# Patient Record
Sex: FEMALE | Employment: OTHER | URBAN - METROPOLITAN AREA
[De-identification: names, ages, dates, MRNs, and addresses within clinical notes are randomized per-mention and may not be internally consistent; named-entity substitution may affect disease eponyms.]

---

## 2021-01-01 ENCOUNTER — APPOINTMENT (OUTPATIENT)
Dept: OCCUPATIONAL THERAPY | Facility: CLINIC | Age: 76
DRG: 834 | End: 2021-01-01
Attending: INTERNAL MEDICINE
Payer: COMMERCIAL

## 2021-01-01 ENCOUNTER — TELEPHONE (OUTPATIENT)
Dept: GASTROENTEROLOGY | Facility: CLINIC | Age: 76
End: 2021-01-01

## 2021-01-01 ENCOUNTER — APPOINTMENT (OUTPATIENT)
Dept: PHYSICAL THERAPY | Facility: CLINIC | Age: 76
DRG: 834 | End: 2021-01-01
Attending: INTERNAL MEDICINE
Payer: COMMERCIAL

## 2021-01-01 ENCOUNTER — APPOINTMENT (OUTPATIENT)
Dept: CARDIOLOGY | Facility: CLINIC | Age: 76
DRG: 834 | End: 2021-01-01
Attending: PHYSICIAN ASSISTANT
Payer: COMMERCIAL

## 2021-01-01 ENCOUNTER — APPOINTMENT (OUTPATIENT)
Dept: PHYSICAL THERAPY | Facility: CLINIC | Age: 76
DRG: 834 | End: 2021-01-01
Attending: PHYSICIAN ASSISTANT
Payer: COMMERCIAL

## 2021-01-01 ENCOUNTER — CARE COORDINATION (OUTPATIENT)
Dept: GASTROENTEROLOGY | Facility: CLINIC | Age: 76
End: 2021-01-01

## 2021-01-01 ENCOUNTER — APPOINTMENT (OUTPATIENT)
Dept: OCCUPATIONAL THERAPY | Facility: CLINIC | Age: 76
DRG: 834 | End: 2021-01-01
Attending: PHYSICIAN ASSISTANT
Payer: COMMERCIAL

## 2021-01-01 ENCOUNTER — APPOINTMENT (OUTPATIENT)
Dept: ULTRASOUND IMAGING | Facility: CLINIC | Age: 76
DRG: 834 | End: 2021-01-01
Attending: PHYSICIAN ASSISTANT
Payer: COMMERCIAL

## 2021-01-01 ENCOUNTER — TRANSCRIBE ORDERS (OUTPATIENT)
Dept: OTHER | Age: 76
End: 2021-01-01

## 2021-01-01 ENCOUNTER — LAB (OUTPATIENT)
Dept: LAB | Facility: CLINIC | Age: 76
DRG: 834 | End: 2021-01-01
Attending: INTERNAL MEDICINE
Payer: COMMERCIAL

## 2021-01-01 ENCOUNTER — PRE VISIT (OUTPATIENT)
Dept: GASTROENTEROLOGY | Facility: CLINIC | Age: 76
End: 2021-01-01

## 2021-01-01 ENCOUNTER — TRANSFERRED RECORDS (OUTPATIENT)
Dept: HEALTH INFORMATION MANAGEMENT | Facility: CLINIC | Age: 76
End: 2021-01-01

## 2021-01-01 ENCOUNTER — APPOINTMENT (OUTPATIENT)
Dept: GENERAL RADIOLOGY | Facility: CLINIC | Age: 76
DRG: 834 | End: 2021-01-01
Attending: INTERNAL MEDICINE
Payer: COMMERCIAL

## 2021-01-01 ENCOUNTER — ANCILLARY PROCEDURE (OUTPATIENT)
Dept: ULTRASOUND IMAGING | Facility: CLINIC | Age: 76
End: 2021-01-01
Payer: COMMERCIAL

## 2021-01-01 ENCOUNTER — HOSPITAL ENCOUNTER (INPATIENT)
Facility: CLINIC | Age: 76
LOS: 12 days | Discharge: HOSPICE | DRG: 834 | End: 2021-07-29
Attending: INTERNAL MEDICINE | Admitting: STUDENT IN AN ORGANIZED HEALTH CARE EDUCATION/TRAINING PROGRAM
Payer: COMMERCIAL

## 2021-01-01 ENCOUNTER — DOCUMENTATION ONLY (OUTPATIENT)
Dept: OTHER | Facility: CLINIC | Age: 76
End: 2021-01-01

## 2021-01-01 ENCOUNTER — VIRTUAL VISIT (OUTPATIENT)
Dept: GASTROENTEROLOGY | Facility: CLINIC | Age: 76
End: 2021-01-01
Attending: PHYSICIAN ASSISTANT
Payer: COMMERCIAL

## 2021-01-01 VITALS
OXYGEN SATURATION: 95 % | BODY MASS INDEX: 25.04 KG/M2 | RESPIRATION RATE: 16 BRPM | TEMPERATURE: 93 F | DIASTOLIC BLOOD PRESSURE: 50 MMHG | HEIGHT: 64 IN | HEART RATE: 107 BPM | SYSTOLIC BLOOD PRESSURE: 125 MMHG | WEIGHT: 146.7 LBS

## 2021-01-01 DIAGNOSIS — B19.20 HEPATITIS C VIRUS INFECTION WITHOUT HEPATIC COMA, UNSPECIFIED CHRONICITY: ICD-10-CM

## 2021-01-01 DIAGNOSIS — D64.9 ANEMIA, UNSPECIFIED TYPE: Primary | ICD-10-CM

## 2021-01-01 DIAGNOSIS — C91.00 ACUTE LYMPHOCYTIC LEUKEMIA (H): Primary | ICD-10-CM

## 2021-01-01 DIAGNOSIS — Z11.59 NEED FOR HEPATITIS B SCREENING TEST: ICD-10-CM

## 2021-01-01 DIAGNOSIS — D69.6 THROMBOCYTOPENIA (H): ICD-10-CM

## 2021-01-01 DIAGNOSIS — Z51.5 HOSPICE CARE PATIENT: ICD-10-CM

## 2021-01-01 DIAGNOSIS — B18.2 CHRONIC HEPATITIS C WITHOUT HEPATIC COMA (H): Primary | ICD-10-CM

## 2021-01-01 DIAGNOSIS — B19.20 HEPATITIS C VIRUS INFECTION WITHOUT HEPATIC COMA, UNSPECIFIED CHRONICITY: Primary | ICD-10-CM

## 2021-01-01 DIAGNOSIS — I85.10 SECONDARY ESOPHAGEAL VARICES WITHOUT BLEEDING (H): ICD-10-CM

## 2021-01-01 DIAGNOSIS — R78.81 GRAM-POSITIVE BACTEREMIA: ICD-10-CM

## 2021-01-01 DIAGNOSIS — B18.2 CHRONIC HEPATITIS C WITHOUT HEPATIC COMA (H): ICD-10-CM

## 2021-01-01 DIAGNOSIS — C91.00 ACUTE LYMPHOBLASTIC LEUKEMIA (ALL) NOT HAVING ACHIEVED REMISSION (H): Primary | ICD-10-CM

## 2021-01-01 LAB
3D LVEF ECHO: NORMAL
ABO/RH(D): NORMAL
ACANTHOCYTES BLD QL SMEAR: SLIGHT
ALBUMIN SERPL-MCNC: 2.1 G/DL (ref 3.4–5)
ALBUMIN SERPL-MCNC: 2.1 G/DL (ref 3.4–5)
ALBUMIN SERPL-MCNC: 2.3 G/DL (ref 3.4–5)
ALBUMIN SERPL-MCNC: 2.4 G/DL (ref 3.4–5)
ALBUMIN SERPL-MCNC: 2.5 G/DL (ref 3.4–5)
ALBUMIN SERPL-MCNC: 3.2 G/DL (ref 3.4–5)
ALBUMIN UR-MCNC: NEGATIVE MG/DL
ALP SERPL-CCNC: 51 U/L (ref 40–150)
ALP SERPL-CCNC: 52 U/L (ref 40–150)
ALP SERPL-CCNC: 55 U/L (ref 40–150)
ALP SERPL-CCNC: 63 U/L (ref 40–150)
ALP SERPL-CCNC: 65 U/L (ref 40–150)
ALP SERPL-CCNC: 68 U/L (ref 40–150)
ALP SERPL-CCNC: 72 U/L (ref 40–150)
ALP SERPL-CCNC: 73 U/L (ref 40–150)
ALP SERPL-CCNC: 78 U/L (ref 40–150)
ALP SERPL-CCNC: 83 U/L (ref 40–150)
ALT SERPL W P-5'-P-CCNC: 13 U/L (ref 0–50)
ALT SERPL W P-5'-P-CCNC: 14 U/L (ref 0–50)
ALT SERPL W P-5'-P-CCNC: 17 U/L (ref 0–50)
ALT SERPL W P-5'-P-CCNC: 25 U/L (ref 0–50)
ALT SERPL W P-5'-P-CCNC: 33 U/L (ref 0–50)
ALT SERPL W P-5'-P-CCNC: 36 U/L (ref 0–50)
ALT SERPL W P-5'-P-CCNC: 37 U/L (ref 0–50)
ALT SERPL W P-5'-P-CCNC: 38 U/L (ref 0–50)
ALT SERPL W P-5'-P-CCNC: 38 U/L (ref 0–50)
ALT SERPL W P-5'-P-CCNC: 39 U/L (ref 0–50)
ALT SERPL-CCNC: 12 IU/L (ref 12–68)
ANION GAP SERPL CALCULATED.3IONS-SCNC: 10 MMOL/L (ref 3–14)
ANION GAP SERPL CALCULATED.3IONS-SCNC: 10 MMOL/L (ref 3–14)
ANION GAP SERPL CALCULATED.3IONS-SCNC: 11 MMOL/L (ref 3–14)
ANION GAP SERPL CALCULATED.3IONS-SCNC: 11 MMOL/L (ref 3–14)
ANION GAP SERPL CALCULATED.3IONS-SCNC: 5 MMOL/L (ref 3–14)
ANION GAP SERPL CALCULATED.3IONS-SCNC: 7 MMOL/L (ref 3–14)
ANION GAP SERPL CALCULATED.3IONS-SCNC: 8 MMOL/L (ref 3–14)
ANION GAP SERPL CALCULATED.3IONS-SCNC: 8 MMOL/L (ref 3–14)
ANION GAP SERPL CALCULATED.3IONS-SCNC: 9 MMOL/L (ref 3–14)
ANTIBODY SCREEN: NEGATIVE
APPEARANCE CSF: CLEAR
APPEARANCE UR: CLEAR
AST SERPL W P-5'-P-CCNC: 52 U/L (ref 0–45)
AST SERPL W P-5'-P-CCNC: 55 U/L (ref 0–45)
AST SERPL W P-5'-P-CCNC: 61 U/L (ref 0–45)
AST SERPL W P-5'-P-CCNC: 63 U/L (ref 0–45)
AST SERPL W P-5'-P-CCNC: 74 U/L (ref 0–45)
AST SERPL W P-5'-P-CCNC: 76 U/L (ref 0–45)
AST SERPL W P-5'-P-CCNC: 86 U/L (ref 0–45)
AST SERPL W P-5'-P-CCNC: 89 U/L (ref 0–45)
AST SERPL W P-5'-P-CCNC: 93 U/L (ref 0–45)
AST SERPL W P-5'-P-CCNC: 96 U/L (ref 0–45)
AST SERPL-CCNC: 62 IU/L (ref 12–37)
ATRIAL RATE - MUSE: 140 BPM
ATRIAL RATE - MUSE: 143 BPM
ATRIAL RATE - MUSE: 416 BPM
BACTERIA BLD CULT: ABNORMAL
BACTERIA BLD CULT: ABNORMAL
BACTERIA BLD CULT: NO GROWTH
BACTERIA CSF CULT: NO GROWTH
BACTERIA UR CULT: NORMAL
BASOPHILS # BLD MANUAL: 0 10E3/UL (ref 0–0.2)
BASOPHILS NFR BLD MANUAL: 0 %
BASOPHILS NFR BLD MANUAL: 1 %
BASOPHILS NFR BLD MANUAL: 2 %
BILIRUB SERPL-MCNC: 1.4 MG/DL (ref 0.2–1.3)
BILIRUB SERPL-MCNC: 1.5 MG/DL (ref 0.2–1.3)
BILIRUB SERPL-MCNC: 1.5 MG/DL (ref 0.2–1.3)
BILIRUB SERPL-MCNC: 1.9 MG/DL (ref 0.2–1.3)
BILIRUB SERPL-MCNC: 2.3 MG/DL (ref 0.2–1.3)
BILIRUB SERPL-MCNC: 2.6 MG/DL (ref 0.2–1.3)
BILIRUB SERPL-MCNC: 2.8 MG/DL (ref 0.2–1.3)
BILIRUB SERPL-MCNC: 3 MG/DL (ref 0.2–1.3)
BILIRUB SERPL-MCNC: 3.1 MG/DL (ref 0.2–1.3)
BILIRUB SERPL-MCNC: 3.3 MG/DL (ref 0.2–1.3)
BILIRUB UR QL STRIP: NEGATIVE
BLASTS # BLD MANUAL: 0 10E3/UL
BLASTS # BLD MANUAL: 0.1 10E3/UL
BLASTS NFR BLD MANUAL: 1 %
BLASTS NFR BLD MANUAL: 2 %
BLASTS NFR BLD MANUAL: 5 %
BLD PROD TYP BPU: NORMAL
BLD PROD TYP BPU: NORMAL
BLOOD COMPONENT TYPE: NORMAL
BLOOD COMPONENT TYPE: NORMAL
BUN SERPL-MCNC: 10 MG/DL (ref 7–30)
BUN SERPL-MCNC: 11 MG/DL (ref 7–30)
BUN SERPL-MCNC: 15 MG/DL (ref 7–30)
BUN SERPL-MCNC: 26 MG/DL (ref 7–30)
BUN SERPL-MCNC: 34 MG/DL (ref 7–30)
BUN SERPL-MCNC: 37 MG/DL (ref 7–30)
BUN SERPL-MCNC: 40 MG/DL (ref 7–30)
BUN SERPL-MCNC: 41 MG/DL (ref 7–30)
BUN SERPL-MCNC: 42 MG/DL (ref 7–30)
BUN SERPL-MCNC: 43 MG/DL (ref 7–30)
BUN SERPL-MCNC: 43 MG/DL (ref 7–30)
BUN SERPL-MCNC: 7 MG/DL (ref 7–30)
BUN SERPL-MCNC: 8 MG/DL (ref 7–30)
BURR CELLS BLD QL SMEAR: SLIGHT
CALCIUM SERPL-MCNC: 7.5 MG/DL (ref 8.5–10.1)
CALCIUM SERPL-MCNC: 7.6 MG/DL (ref 8.5–10.1)
CALCIUM SERPL-MCNC: 7.7 MG/DL (ref 8.5–10.1)
CALCIUM SERPL-MCNC: 7.8 MG/DL (ref 8.5–10.1)
CALCIUM SERPL-MCNC: 7.9 MG/DL (ref 8.5–10.1)
CALCIUM SERPL-MCNC: 8.1 MG/DL (ref 8.5–10.1)
CALCIUM SERPL-MCNC: 8.2 MG/DL (ref 8.5–10.1)
CALCIUM SERPL-MCNC: 8.3 MG/DL (ref 8.5–10.1)
CALCIUM SERPL-MCNC: 8.4 MG/DL (ref 8.5–10.1)
CALCIUM SERPL-MCNC: 8.4 MG/DL (ref 8.5–10.1)
CALCIUM SERPL-MCNC: 8.7 MG/DL (ref 8.5–10.1)
CALCIUM SERPL-MCNC: 8.8 MG/DL (ref 8.5–10.1)
CALCIUM SERPL-MCNC: 8.9 MG/DL (ref 8.5–10.1)
CHLORIDE BLD-SCNC: 104 MMOL/L (ref 94–109)
CHLORIDE BLD-SCNC: 105 MMOL/L (ref 94–109)
CHLORIDE BLD-SCNC: 105 MMOL/L (ref 94–109)
CHLORIDE BLD-SCNC: 106 MMOL/L (ref 94–109)
CHLORIDE BLD-SCNC: 107 MMOL/L (ref 94–109)
CHLORIDE BLD-SCNC: 109 MMOL/L (ref 94–109)
CHLORIDE SERPL-SCNC: 105 MMOL/L (ref 94–109)
CMV IGG SERPL IA-ACNC: <0.2 U/ML
CMV IGG SERPL IA-ACNC: NORMAL
CO2 SERPL-SCNC: 21 MMOL/L (ref 20–32)
CO2 SERPL-SCNC: 22 MMOL/L (ref 20–32)
CO2 SERPL-SCNC: 23 MMOL/L (ref 20–32)
CO2 SERPL-SCNC: 24 MMOL/L (ref 20–32)
CO2 SERPL-SCNC: 24 MMOL/L (ref 20–32)
CO2 SERPL-SCNC: 25 MMOL/L (ref 20–32)
CODING SYSTEM: NORMAL
CODING SYSTEM: NORMAL
COLOR CSF: COLORLESS
COLOR UR AUTO: YELLOW
CREAT SERPL-MCNC: 0.47 MG/DL (ref 0.52–1.04)
CREAT SERPL-MCNC: 0.49 MG/DL (ref 0.52–1.04)
CREAT SERPL-MCNC: 0.5 MG/DL (ref 0.52–1.04)
CREAT SERPL-MCNC: 0.52 MG/DL (ref 0.52–1.04)
CREAT SERPL-MCNC: 0.55 MG/DL (ref 0.52–1.04)
CREAT SERPL-MCNC: 0.62 MG/DL (ref 0.52–1.04)
CREAT SERPL-MCNC: 0.67 MG/DL (ref 0.52–1.04)
CREAT SERPL-MCNC: 0.67 MG/DL (ref 0.52–1.04)
CREAT SERPL-MCNC: 0.68 MG/DL (ref 0.52–1.04)
CREAT SERPL-MCNC: 0.76 MG/DL (ref 0.52–1.04)
CREAT SERPL-MCNC: 0.78 MG/DL (ref 0.52–1.04)
CREAT SERPL-MCNC: 0.86 MG/DL (ref 0.52–1.04)
CREAT SERPL-MCNC: 0.86 MG/DL (ref 0.52–1.04)
CREATININE (EXTERNAL): 0.36 MG/DL (ref 0.55–1.02)
CREATININE (EXTERNAL): 0.4 MG/DL (ref 0.55–1.02)
CROSSMATCH: NORMAL
DACRYOCYTES BLD QL SMEAR: ABNORMAL
DACRYOCYTES BLD QL SMEAR: ABNORMAL
DACRYOCYTES BLD QL SMEAR: SLIGHT
DIASTOLIC BLOOD PRESSURE - MUSE: NORMAL MMHG
EBV VCA IGG SER IA-ACNC: 545 U/ML
EBV VCA IGG SER IA-ACNC: POSITIVE
ELLIPTOCYTES BLD QL SMEAR: ABNORMAL
ELLIPTOCYTES BLD QL SMEAR: SLIGHT
ENTEROCOCCUS FAECALIS: NOT DETECTED
ENTEROCOCCUS FAECIUM: NOT DETECTED
EOSINOPHIL # BLD MANUAL: 0 10E3/UL (ref 0–0.7)
EOSINOPHIL NFR BLD MANUAL: 0 %
EOSINOPHIL NFR BLD MANUAL: 1 %
ERYTHROCYTE [DISTWIDTH] IN BLOOD BY AUTOMATED COUNT: 18.1 % (ref 10–15)
ERYTHROCYTE [DISTWIDTH] IN BLOOD BY AUTOMATED COUNT: 18.6 % (ref 10–15)
ERYTHROCYTE [DISTWIDTH] IN BLOOD BY AUTOMATED COUNT: 18.7 % (ref 10–15)
ERYTHROCYTE [DISTWIDTH] IN BLOOD BY AUTOMATED COUNT: 19 % (ref 10–15)
ERYTHROCYTE [DISTWIDTH] IN BLOOD BY AUTOMATED COUNT: 20.6 % (ref 10–15)
ERYTHROCYTE [DISTWIDTH] IN BLOOD BY AUTOMATED COUNT: 20.8 % (ref 10–15)
ERYTHROCYTE [DISTWIDTH] IN BLOOD BY AUTOMATED COUNT: 21.7 % (ref 10–15)
ERYTHROCYTE [DISTWIDTH] IN BLOOD BY AUTOMATED COUNT: 22.4 % (ref 10–15)
ERYTHROCYTE [DISTWIDTH] IN BLOOD BY AUTOMATED COUNT: 22.5 % (ref 10–15)
ERYTHROCYTE [DISTWIDTH] IN BLOOD BY AUTOMATED COUNT: 22.6 % (ref 10–15)
ERYTHROCYTE [DISTWIDTH] IN BLOOD BY AUTOMATED COUNT: 22.6 % (ref 10–15)
ERYTHROCYTE [DISTWIDTH] IN BLOOD BY AUTOMATED COUNT: 22.7 % (ref 10–15)
FIBRINOGEN PPP-MCNC: 106 MG/DL (ref 170–490)
FIBRINOGEN PPP-MCNC: 109 MG/DL (ref 170–490)
FIBRINOGEN PPP-MCNC: 117 MG/DL (ref 170–490)
FIBRINOGEN PPP-MCNC: 117 MG/DL (ref 170–490)
FIBRINOGEN PPP-MCNC: 119 MG/DL (ref 170–490)
FIBRINOGEN PPP-MCNC: 120 MG/DL (ref 170–490)
FIBRINOGEN PPP-MCNC: 122 MG/DL (ref 170–490)
FIBRINOGEN PPP-MCNC: 123 MG/DL (ref 170–490)
FIBRINOGEN PPP-MCNC: 125 MG/DL (ref 170–490)
FIBRINOGEN PPP-MCNC: 137 MG/DL (ref 170–490)
FIBRINOGEN PPP-MCNC: 155 MG/DL (ref 170–490)
FIBRINOGEN PPP-MCNC: 218 MG/DL (ref 170–490)
FIBRINOGEN PPP-MCNC: 289 MG/DL (ref 170–490)
FIBRINOGEN PPP-MCNC: 304 MG/DL (ref 170–490)
FIBRINOGEN PPP-MCNC: 332 MG/DL (ref 170–490)
FIBRINOGEN PPP-MCNC: 344 MG/DL (ref 170–490)
FIBRINOGEN PPP-MCNC: 99 MG/DL (ref 170–490)
FRAGMENTS BLD QL SMEAR: SLIGHT
GFR ESTIMATED (EXTERNAL): >60 ML/MIN
GFR ESTIMATED (EXTERNAL): >60 ML/MIN/1.73M2
GFR ESTIMATED (IF AFRICAN AMERICAN) (EXTERNAL): >60 ML/MIN
GFR ESTIMATED (IF AFRICAN AMERICAN) (EXTERNAL): >60 ML/MIN/1.73M2
GFR SERPL CREATININE-BSD FRML MDRD: 66 ML/MIN/1.73M2
GFR SERPL CREATININE-BSD FRML MDRD: 66 ML/MIN/1.73M2
GFR SERPL CREATININE-BSD FRML MDRD: 75 ML/MIN/1.73M2
GFR SERPL CREATININE-BSD FRML MDRD: 77 ML/MIN/1.73M2
GFR SERPL CREATININE-BSD FRML MDRD: 86 ML/MIN/1.73M2
GFR SERPL CREATININE-BSD FRML MDRD: 88 ML/MIN/1.73M2
GFR SERPL CREATININE-BSD FRML MDRD: >90 ML/MIN/1.73M2
GFR SERPL CREATININE-BSD FRML MDRD: >90 ML/MIN/{1.73_M2}
GLUCOSE (EXTERNAL): 86 MG/DL (ref 74–106)
GLUCOSE (EXTERNAL): 87 MG/DL (ref 74–106)
GLUCOSE BLD-MCNC: 117 MG/DL (ref 70–99)
GLUCOSE BLD-MCNC: 149 MG/DL (ref 70–99)
GLUCOSE BLD-MCNC: 151 MG/DL (ref 70–99)
GLUCOSE BLD-MCNC: 163 MG/DL (ref 70–99)
GLUCOSE BLD-MCNC: 185 MG/DL (ref 70–99)
GLUCOSE BLD-MCNC: 189 MG/DL (ref 70–99)
GLUCOSE BLD-MCNC: 191 MG/DL (ref 70–99)
GLUCOSE BLD-MCNC: 194 MG/DL (ref 70–99)
GLUCOSE BLD-MCNC: 195 MG/DL (ref 70–99)
GLUCOSE BLD-MCNC: 199 MG/DL (ref 70–99)
GLUCOSE BLD-MCNC: 208 MG/DL (ref 70–99)
GLUCOSE BLD-MCNC: 81 MG/DL (ref 70–99)
GLUCOSE CSF-MCNC: 81 MG/DL (ref 40–70)
GLUCOSE SERPL-MCNC: 90 MG/DL (ref 70–99)
GLUCOSE UR STRIP-MCNC: NEGATIVE MG/DL
GRAM STAIN RESULT: NORMAL
GRAM STAIN RESULT: NORMAL
HAV IGG SER QL IA: NONREACTIVE
HBV CORE AB SERPL QL IA: REACTIVE
HBV CORE AB SERPL QL IA: REACTIVE
HBV DNA SERPL NAA+PROBE-ACNC: NOT DETECTED IU/ML
HBV SURFACE AB SERPL IA-ACNC: >1000 M[IU]/ML
HBV SURFACE AB SERPL IA-ACNC: >1000 M[IU]/ML
HBV SURFACE AG SERPL QL IA: NONREACTIVE
HCT VFR BLD AUTO: 22.1 % (ref 35–47)
HCT VFR BLD AUTO: 22.3 % (ref 35–47)
HCT VFR BLD AUTO: 22.6 % (ref 35–47)
HCT VFR BLD AUTO: 23.2 % (ref 35–47)
HCT VFR BLD AUTO: 23.2 % (ref 35–47)
HCT VFR BLD AUTO: 23.4 % (ref 35–47)
HCT VFR BLD AUTO: 23.4 % (ref 35–47)
HCT VFR BLD AUTO: 23.8 % (ref 35–47)
HCT VFR BLD AUTO: 24.2 % (ref 35–47)
HCT VFR BLD AUTO: 24.3 % (ref 35–47)
HCT VFR BLD AUTO: 25.6 % (ref 35–47)
HCT VFR BLD AUTO: 25.9 % (ref 35–47)
HCT VFR BLD AUTO: 26.5 % (ref 35–47)
HCT VFR BLD AUTO: 27.3 % (ref 35–47)
HCV RNA SERPL NAA+PROBE-ACNC: NOT DETECTED IU/ML
HEMOCCULT STL QL IA: NEGATIVE
HGB BLD-MCNC: 7.1 G/DL (ref 11.7–15.7)
HGB BLD-MCNC: 7.2 G/DL (ref 11.7–15.7)
HGB BLD-MCNC: 7.4 G/DL (ref 11.7–15.7)
HGB BLD-MCNC: 7.7 G/DL (ref 11.7–15.7)
HGB BLD-MCNC: 7.7 G/DL (ref 11.7–15.7)
HGB BLD-MCNC: 7.8 G/DL (ref 11.7–15.7)
HGB BLD-MCNC: 7.8 G/DL (ref 11.7–15.7)
HGB BLD-MCNC: 8 G/DL (ref 11.7–15.7)
HGB BLD-MCNC: 8.2 G/DL (ref 11.7–15.7)
HGB BLD-MCNC: 8.5 G/DL (ref 11.7–15.7)
HGB BLD-MCNC: 8.9 G/DL (ref 11.7–15.7)
HGB BLD-MCNC: 9.2 G/DL (ref 11.7–15.7)
HGB UR QL STRIP: NEGATIVE
HIV 1+2 AB+HIV1 P24 AG SERPL QL IA: NONREACTIVE
HOLD SPECIMEN: NORMAL
HSV1 IGG SERPL QL IA: 35.4 INDEX
HSV1 IGG SERPL QL IA: ABNORMAL
HSV2 IGG SERPL QL IA: 14.7 INDEX
HSV2 IGG SERPL QL IA: ABNORMAL
INR (EXTERNAL): 1.1 (ref 1–1.2)
INR (EXTERNAL): 1.2 (ref 1–1.2)
INR PPP: 1.14 (ref 0.85–1.15)
INR PPP: 1.16 (ref 0.86–1.14)
INR PPP: 1.18 (ref 0.85–1.15)
INR PPP: 1.2 (ref 0.85–1.15)
INR PPP: 1.21 (ref 0.85–1.15)
INR PPP: 1.3 (ref 0.85–1.15)
INR PPP: 1.31 (ref 0.85–1.15)
INR PPP: 1.36 (ref 0.85–1.15)
INR PPP: 1.37 (ref 0.85–1.15)
INR PPP: 1.41 (ref 0.85–1.15)
INR PPP: 1.45 (ref 0.85–1.15)
INR PPP: 1.46 (ref 0.85–1.15)
INR PPP: 1.46 (ref 0.86–1.14)
INR PPP: 1.49 (ref 0.85–1.15)
INR PPP: 1.5 (ref 0.85–1.15)
INR PPP: 1.53 (ref 0.85–1.15)
INR PPP: 1.54 (ref 0.85–1.15)
INR PPP: 1.61 (ref 0.85–1.15)
INTERPRETATION ECG - MUSE: NORMAL
ISSUE DATE AND TIME: NORMAL
ISSUE DATE AND TIME: NORMAL
KETONES UR STRIP-MCNC: NEGATIVE MG/DL
LACTATE SERPL-SCNC: 2.3 MMOL/L (ref 0.7–2)
LACTATE SERPL-SCNC: 2.4 MMOL/L (ref 0.7–2)
LACTATE SERPL-SCNC: 2.5 MMOL/L (ref 0.7–2)
LACTATE SERPL-SCNC: 2.9 MMOL/L (ref 0.7–2)
LACTATE SERPL-SCNC: 3 MMOL/L (ref 0.7–2)
LACTATE SERPL-SCNC: 3.2 MMOL/L (ref 0.7–2)
LACTATE SERPL-SCNC: 3.3 MMOL/L (ref 0.7–2)
LACTATE SERPL-SCNC: 3.4 MMOL/L (ref 0.7–2)
LACTATE SERPL-SCNC: 3.5 MMOL/L (ref 0.7–2)
LACTATE SERPL-SCNC: 3.5 MMOL/L (ref 0.7–2)
LACTATE SERPL-SCNC: 3.6 MMOL/L (ref 0.7–2)
LDH SERPL L TO P-CCNC: 496 U/L (ref 81–234)
LDH SERPL L TO P-CCNC: 545 U/L (ref 81–234)
LDH SERPL L TO P-CCNC: 591 U/L (ref 81–234)
LDH SERPL L TO P-CCNC: 852 U/L (ref 81–234)
LEUKOCYTE ESTERASE UR QL STRIP: NEGATIVE
LISTERIA SPECIES (DETECTED/NOT DETECTED): NOT DETECTED
LVEF ECHO: NORMAL
LYMPHOCYTES # BLD MANUAL: 0 10E3/UL (ref 0.8–5.3)
LYMPHOCYTES # BLD MANUAL: 0.1 10E3/UL (ref 0.8–5.3)
LYMPHOCYTES # BLD MANUAL: 0.2 10E3/UL (ref 0.8–5.3)
LYMPHOCYTES # BLD MANUAL: 0.3 10E3/UL (ref 0.8–5.3)
LYMPHOCYTES # BLD MANUAL: 0.3 10E3/UL (ref 0.8–5.3)
LYMPHOCYTES NFR BLD MANUAL: 16 %
LYMPHOCYTES NFR BLD MANUAL: 17 %
LYMPHOCYTES NFR BLD MANUAL: 19 %
LYMPHOCYTES NFR BLD MANUAL: 2 %
LYMPHOCYTES NFR BLD MANUAL: 32 %
LYMPHOCYTES NFR BLD MANUAL: 4 %
LYMPHOCYTES NFR BLD MANUAL: 4 %
LYMPHOCYTES NFR BLD MANUAL: 5 %
LYMPHOCYTES NFR BLD MANUAL: 7 %
LYMPHOCYTES NFR BLD MANUAL: 7 %
Lab: DETECTED
MAGNESIUM SERPL-MCNC: 1.8 MG/DL (ref 1.6–2.3)
MAGNESIUM SERPL-MCNC: 2.1 MG/DL (ref 1.6–2.3)
MAGNESIUM SERPL-MCNC: 2.3 MG/DL (ref 1.6–2.3)
MAGNESIUM SERPL-MCNC: 2.6 MG/DL (ref 1.6–2.3)
MAGNESIUM SERPL-MCNC: 2.6 MG/DL (ref 1.6–2.3)
MAGNESIUM SERPL-MCNC: 2.8 MG/DL (ref 1.6–2.3)
MAGNESIUM SERPL-MCNC: 2.9 MG/DL (ref 1.6–2.3)
MAGNESIUM SERPL-MCNC: 3 MG/DL (ref 1.6–2.3)
MCH RBC QN AUTO: 30.2 PG (ref 26.5–33)
MCH RBC QN AUTO: 31.1 PG (ref 26.5–33)
MCH RBC QN AUTO: 31.4 PG (ref 26.5–33)
MCH RBC QN AUTO: 31.6 PG (ref 26.5–33)
MCH RBC QN AUTO: 31.7 PG (ref 26.5–33)
MCH RBC QN AUTO: 31.7 PG (ref 26.5–33)
MCH RBC QN AUTO: 31.8 PG (ref 26.5–33)
MCH RBC QN AUTO: 31.9 PG (ref 26.5–33)
MCH RBC QN AUTO: 31.9 PG (ref 26.5–33)
MCH RBC QN AUTO: 32 PG (ref 26.5–33)
MCH RBC QN AUTO: 32.1 PG (ref 26.5–33)
MCH RBC QN AUTO: 32.1 PG (ref 26.5–33)
MCH RBC QN AUTO: 32.2 PG (ref 26.5–33)
MCH RBC QN AUTO: 32.3 PG (ref 26.5–33)
MCHC RBC AUTO-ENTMCNC: 30.8 G/DL (ref 31.5–36.5)
MCHC RBC AUTO-ENTMCNC: 31.4 G/DL (ref 31.5–36.5)
MCHC RBC AUTO-ENTMCNC: 31.7 G/DL (ref 31.5–36.5)
MCHC RBC AUTO-ENTMCNC: 31.8 G/DL (ref 31.5–36.5)
MCHC RBC AUTO-ENTMCNC: 31.8 G/DL (ref 31.5–36.5)
MCHC RBC AUTO-ENTMCNC: 31.9 G/DL (ref 31.5–36.5)
MCHC RBC AUTO-ENTMCNC: 32.1 G/DL (ref 31.5–36.5)
MCHC RBC AUTO-ENTMCNC: 32.1 G/DL (ref 31.5–36.5)
MCHC RBC AUTO-ENTMCNC: 32.8 G/DL (ref 31.5–36.5)
MCHC RBC AUTO-ENTMCNC: 33.2 G/DL (ref 31.5–36.5)
MCHC RBC AUTO-ENTMCNC: 33.2 G/DL (ref 31.5–36.5)
MCHC RBC AUTO-ENTMCNC: 33.6 G/DL (ref 31.5–36.5)
MCHC RBC AUTO-ENTMCNC: 33.7 G/DL (ref 31.5–36.5)
MCHC RBC AUTO-ENTMCNC: 34.2 G/DL (ref 31.5–36.5)
MCV RBC AUTO: 100 FL (ref 78–100)
MCV RBC AUTO: 101 FL (ref 78–100)
MCV RBC AUTO: 105 FL (ref 78–100)
MCV RBC AUTO: 90 FL (ref 78–100)
MCV RBC AUTO: 94 FL (ref 78–100)
MCV RBC AUTO: 94 FL (ref 78–100)
MCV RBC AUTO: 96 FL (ref 78–100)
MCV RBC AUTO: 97 FL (ref 78–100)
MCV RBC AUTO: 98 FL (ref 78–100)
MCV RBC AUTO: 98 FL (ref 78–100)
MCV RBC AUTO: 99 FL (ref 78–100)
METAMYELOCYTES # BLD MANUAL: 0 10E3/UL
METAMYELOCYTES NFR BLD MANUAL: 1 %
MONOCYTES # BLD MANUAL: 0 10E3/UL (ref 0–1.3)
MONOCYTES # BLD MANUAL: 0.1 10E3/UL (ref 0–1.3)
MONOCYTES NFR BLD MANUAL: 0 %
MONOCYTES NFR BLD MANUAL: 1 %
MONOCYTES NFR BLD MANUAL: 2 %
MONOCYTES NFR BLD MANUAL: 3 %
MONOCYTES NFR BLD MANUAL: 4 %
MRSA DNA SPEC QL NAA+PROBE: NEGATIVE
MYELOCYTES # BLD MANUAL: 0 10E3/UL
MYELOCYTES NFR BLD MANUAL: 1 %
NEUTROPHILS # BLD MANUAL: 0.5 10E3/UL (ref 1.6–8.3)
NEUTROPHILS # BLD MANUAL: 0.6 10E3/UL (ref 1.6–8.3)
NEUTROPHILS # BLD MANUAL: 0.6 10E3/UL (ref 1.6–8.3)
NEUTROPHILS # BLD MANUAL: 0.7 10E3/UL (ref 1.6–8.3)
NEUTROPHILS # BLD MANUAL: 0.7 10E3/UL (ref 1.6–8.3)
NEUTROPHILS # BLD MANUAL: 0.8 10E3/UL (ref 1.6–8.3)
NEUTROPHILS # BLD MANUAL: 1 10E3/UL (ref 1.6–8.3)
NEUTROPHILS # BLD MANUAL: 1 10E3/UL (ref 1.6–8.3)
NEUTROPHILS # BLD MANUAL: 1.1 10E3/UL (ref 1.6–8.3)
NEUTROPHILS # BLD MANUAL: 1.2 10E3/UL (ref 1.6–8.3)
NEUTROPHILS NFR BLD MANUAL: 60 %
NEUTROPHILS NFR BLD MANUAL: 73 %
NEUTROPHILS NFR BLD MANUAL: 80 %
NEUTROPHILS NFR BLD MANUAL: 82 %
NEUTROPHILS NFR BLD MANUAL: 87 %
NEUTROPHILS NFR BLD MANUAL: 89 %
NEUTROPHILS NFR BLD MANUAL: 94 %
NEUTROPHILS NFR BLD MANUAL: 94 %
NEUTROPHILS NFR BLD MANUAL: 96 %
NEUTROPHILS NFR BLD MANUAL: 97 %
NITRATE UR QL: NEGATIVE
NRBC # BLD AUTO: 0 10E3/UL
NRBC # BLD AUTO: 0.1 10E3/UL
NRBC BLD AUTO-RTO: 2 /100
NRBC BLD AUTO-RTO: 4 /100
NRBC BLD AUTO-RTO: 5 /100
NRBC BLD MANUAL-RTO: 1 %
NRBC BLD MANUAL-RTO: 13 %
NRBC BLD MANUAL-RTO: 2 %
NRBC BLD MANUAL-RTO: 2 %
NRBC BLD MANUAL-RTO: 3 %
NRBC BLD MANUAL-RTO: 4 %
NRBC BLD MANUAL-RTO: 4 %
NRBC BLD MANUAL-RTO: 5 %
NRBC BLD MANUAL-RTO: 9 %
OTHER CELLS # BLD MANUAL: 0 10E3/UL
OTHER CELLS NFR BLD MANUAL: 4 %
P AXIS - MUSE: 88 DEGREES
P AXIS - MUSE: NORMAL DEGREES
P AXIS - MUSE: NORMAL DEGREES
PATH REPORT.COMMENTS IMP SPEC: NORMAL
PATH REPORT.FINAL DX SPEC: NORMAL
PATH REPORT.GROSS SPEC: NORMAL
PATH REPORT.MICROSCOPIC SPEC OTHER STN: NORMAL
PATH REPORT.RELEVANT HX SPEC: NORMAL
PATH REPORT.SITE OF ORIGIN SPEC: NORMAL
PATH REPORT.SUPPLEMENTAL REPORTS SPEC: NORMAL
PATH REV: ABNORMAL
PH UR STRIP: 6 [PH] (ref 5–7)
PHOSPHATE SERPL-MCNC: 2.9 MG/DL (ref 2.5–4.5)
PHOSPHATE SERPL-MCNC: 3 MG/DL (ref 2.5–4.5)
PHOSPHATE SERPL-MCNC: 4.2 MG/DL (ref 2.5–4.5)
PHOSPHATE SERPL-MCNC: 4.6 MG/DL (ref 2.5–4.5)
PHOSPHATE SERPL-MCNC: 4.6 MG/DL (ref 2.5–4.5)
PHOSPHATE SERPL-MCNC: 4.7 MG/DL (ref 2.5–4.5)
PHOSPHATE SERPL-MCNC: 4.8 MG/DL (ref 2.5–4.5)
PHOSPHATE SERPL-MCNC: 5 MG/DL (ref 2.5–4.5)
PHOSPHATE SERPL-MCNC: 5.1 MG/DL (ref 2.5–4.5)
PHOSPHATE SERPL-MCNC: 5.1 MG/DL (ref 2.5–4.5)
PHOSPHATE SERPL-MCNC: 5.2 MG/DL (ref 2.5–4.5)
PHOSPHATE SERPL-MCNC: 5.4 MG/DL (ref 2.5–4.5)
PHOSPHATE SERPL-MCNC: 5.4 MG/DL (ref 2.5–4.5)
PHOSPHATE SERPL-MCNC: 5.8 MG/DL (ref 2.5–4.5)
PHOSPHATE SERPL-MCNC: 5.9 MG/DL (ref 2.5–4.5)
PLAT MORPH BLD: ABNORMAL
PLATELET # BLD AUTO: 105 10E3/UL (ref 150–450)
PLATELET # BLD AUTO: 12 10E3/UL (ref 150–450)
PLATELET # BLD AUTO: 14 10E3/UL (ref 150–450)
PLATELET # BLD AUTO: 23 10E3/UL (ref 150–450)
PLATELET # BLD AUTO: 28 10E3/UL (ref 150–450)
PLATELET # BLD AUTO: 52 10E3/UL (ref 150–450)
PLATELET # BLD AUTO: 56 10E3/UL (ref 150–450)
PLATELET # BLD AUTO: 72 10E3/UL (ref 150–450)
PLATELET # BLD AUTO: 74 10E3/UL (ref 150–450)
PLATELET # BLD AUTO: 76 10E9/L (ref 150–450)
PLATELET # BLD AUTO: 80 10E3/UL (ref 150–450)
PLATELET # BLD AUTO: 82 10E3/UL (ref 150–450)
PLATELET # BLD AUTO: 85 10E3/UL (ref 150–450)
PLATELET # BLD AUTO: 86 10E3/UL (ref 150–450)
POLYCHROMASIA BLD QL SMEAR: SLIGHT
POLYCHROMASIA BLD QL SMEAR: SLIGHT
POTASSIUM (EXTERNAL): 3.1 MMOL/L (ref 3.5–5.1)
POTASSIUM (EXTERNAL): 3.4 MMOL/L (ref 3.5–5.1)
POTASSIUM BLD-SCNC: 3.4 MMOL/L (ref 3.4–5.3)
POTASSIUM BLD-SCNC: 3.4 MMOL/L (ref 3.4–5.3)
POTASSIUM BLD-SCNC: 3.8 MMOL/L (ref 3.4–5.3)
POTASSIUM BLD-SCNC: 4 MMOL/L (ref 3.4–5.3)
POTASSIUM BLD-SCNC: 4 MMOL/L (ref 3.4–5.3)
POTASSIUM BLD-SCNC: 4.2 MMOL/L (ref 3.4–5.3)
POTASSIUM BLD-SCNC: 4.2 MMOL/L (ref 3.4–5.3)
POTASSIUM BLD-SCNC: 4.3 MMOL/L (ref 3.4–5.3)
POTASSIUM BLD-SCNC: 4.3 MMOL/L (ref 3.4–5.3)
POTASSIUM BLD-SCNC: 4.4 MMOL/L (ref 3.4–5.3)
POTASSIUM BLD-SCNC: 4.6 MMOL/L (ref 3.4–5.3)
POTASSIUM BLD-SCNC: 4.7 MMOL/L (ref 3.4–5.3)
POTASSIUM SERPL-SCNC: 3.5 MMOL/L (ref 3.4–5.3)
PR INTERVAL - MUSE: 136 MS
PR INTERVAL - MUSE: 160 MS
PR INTERVAL - MUSE: NORMAL MS
PROMYELOCYTES # BLD MANUAL: 0 10E3/UL
PROMYELOCYTES # BLD MANUAL: 0.1 10E3/UL
PROMYELOCYTES NFR BLD MANUAL: 1 %
PROMYELOCYTES NFR BLD MANUAL: 4 %
PROT CSF-MCNC: 48 MG/DL (ref 15–60)
PROT SERPL-MCNC: 4.7 G/DL (ref 6.8–8.8)
PROT SERPL-MCNC: 4.8 G/DL (ref 6.8–8.8)
PROT SERPL-MCNC: 4.8 G/DL (ref 6.8–8.8)
PROT SERPL-MCNC: 4.9 G/DL (ref 6.8–8.8)
PROT SERPL-MCNC: 5 G/DL (ref 6.8–8.8)
PROT SERPL-MCNC: 5 G/DL (ref 6.8–8.8)
PROT SERPL-MCNC: 5.1 G/DL (ref 6.8–8.8)
PROT SERPL-MCNC: 5.4 G/DL (ref 6.8–8.8)
PROT SERPL-MCNC: 5.4 G/DL (ref 6.8–8.8)
PROT SERPL-MCNC: 6.7 G/DL (ref 6.8–8.8)
QRS DURATION - MUSE: 66 MS
QRS DURATION - MUSE: 80 MS
QRS DURATION - MUSE: 84 MS
QT - MUSE: 272 MS
QT - MUSE: 298 MS
QT - MUSE: 304 MS
QTC - MUSE: 419 MS
QTC - MUSE: 445 MS
QTC - MUSE: 464 MS
R AXIS - MUSE: -21 DEGREES
R AXIS - MUSE: -23 DEGREES
R AXIS - MUSE: -7 DEGREES
RBC # BLD AUTO: 2.21 10E6/UL (ref 3.8–5.2)
RBC # BLD AUTO: 2.26 10E6/UL (ref 3.8–5.2)
RBC # BLD AUTO: 2.28 10E6/UL (ref 3.8–5.2)
RBC # BLD AUTO: 2.32 10E6/UL (ref 3.8–5.2)
RBC # BLD AUTO: 2.42 10E6/UL (ref 3.8–5.2)
RBC # BLD AUTO: 2.42 10E6/UL (ref 3.8–5.2)
RBC # BLD AUTO: 2.43 10E6/UL (ref 3.8–5.2)
RBC # BLD AUTO: 2.46 10E6/UL (ref 3.8–5.2)
RBC # BLD AUTO: 2.5 10E6/UL (ref 3.8–5.2)
RBC # BLD AUTO: 2.57 10E6/UL (ref 3.8–5.2)
RBC # BLD AUTO: 2.63 10E6/UL (ref 3.8–5.2)
RBC # BLD AUTO: 2.82 10E6/UL (ref 3.8–5.2)
RBC # BLD AUTO: 3.05 10E12/L (ref 3.8–5.2)
RBC # CSF MANUAL: 32 /UL (ref 0–2)
RBC MORPH BLD: ABNORMAL
RBC URINE: 0 /HPF
SA TARGET DNA: NEGATIVE
SARS-COV-2 RNA RESP QL NAA+PROBE: NEGATIVE
SARS-COV-2 RNA RESP QL NAA+PROBE: NEGATIVE
SODIUM SERPL-SCNC: 135 MMOL/L (ref 133–144)
SODIUM SERPL-SCNC: 136 MMOL/L (ref 133–144)
SODIUM SERPL-SCNC: 137 MMOL/L (ref 133–144)
SODIUM SERPL-SCNC: 138 MMOL/L (ref 133–144)
SODIUM SERPL-SCNC: 138 MMOL/L (ref 133–144)
SODIUM SERPL-SCNC: 139 MMOL/L (ref 133–144)
SP GR UR STRIP: 1.01 (ref 1–1.03)
SPECIMEN EXPIRATION DATE: NORMAL
STAPHYLOCOCCUS AUREUS: NOT DETECTED
STAPHYLOCOCCUS EPIDERMIDIS: DETECTED
STAPHYLOCOCCUS LUGDUNENSIS: NOT DETECTED
STREPTOCOCCUS AGALACTIAE: NOT DETECTED
STREPTOCOCCUS ANGINOSUS GROUP: NOT DETECTED
STREPTOCOCCUS PNEUMONIAE: NOT DETECTED
STREPTOCOCCUS PYOGENES: NOT DETECTED
STREPTOCOCCUS SPECIES: NOT DETECTED
SYSTOLIC BLOOD PRESSURE - MUSE: NORMAL MMHG
T AXIS - MUSE: 116 DEGREES
T AXIS - MUSE: 131 DEGREES
T AXIS - MUSE: 161 DEGREES
TRANSITIONAL EPI: 1 /HPF
TUBE # CSF: 4
UNIT ABO/RH: NORMAL
UNIT ABO/RH: NORMAL
UNIT NUMBER: NORMAL
UNIT NUMBER: NORMAL
UNIT STATUS: NORMAL
UNIT STATUS: NORMAL
UNIT TYPE ISBT: 6200
UNIT TYPE ISBT: 9500
UPPER GI ENDOSCOPY: NORMAL
URATE SERPL-MCNC: 3.1 MG/DL (ref 2.6–6)
URATE SERPL-MCNC: 3.4 MG/DL (ref 2.6–6)
URATE SERPL-MCNC: 3.4 MG/DL (ref 2.6–6)
URATE SERPL-MCNC: 3.7 MG/DL (ref 2.6–6)
URATE SERPL-MCNC: 3.8 MG/DL (ref 2.6–6)
URATE SERPL-MCNC: 3.9 MG/DL (ref 2.6–6)
URATE SERPL-MCNC: 4.2 MG/DL (ref 2.6–6)
URATE SERPL-MCNC: 4.4 MG/DL (ref 2.6–6)
URATE SERPL-MCNC: 4.4 MG/DL (ref 2.6–6)
URATE SERPL-MCNC: 4.6 MG/DL (ref 2.6–6)
URATE SERPL-MCNC: 4.9 MG/DL (ref 2.6–6)
URATE SERPL-MCNC: 4.9 MG/DL (ref 2.6–6)
UROBILINOGEN UR STRIP-MCNC: NORMAL MG/DL
VENTRICULAR RATE- MUSE: 134 BPM
VENTRICULAR RATE- MUSE: 140 BPM
VENTRICULAR RATE- MUSE: 143 BPM
WBC # BLD AUTO: 0.1 10E3/UL (ref 4–11)
WBC # BLD AUTO: 0.2 10E3/UL (ref 4–11)
WBC # BLD AUTO: 0.4 10E3/UL (ref 4–11)
WBC # BLD AUTO: 0.6 10E3/UL (ref 4–11)
WBC # BLD AUTO: 0.6 10E3/UL (ref 4–11)
WBC # BLD AUTO: 0.8 10E3/UL (ref 4–11)
WBC # BLD AUTO: 0.9 10E3/UL (ref 4–11)
WBC # BLD AUTO: 1 10E3/UL (ref 4–11)
WBC # BLD AUTO: 1.1 10E3/UL (ref 4–11)
WBC # BLD AUTO: 1.3 10E3/UL (ref 4–11)
WBC # BLD AUTO: 1.3 10E9/L (ref 4–11)
WBC # BLD AUTO: 1.5 10E3/UL (ref 4–11)
WBC # CSF MANUAL: 0 /UL (ref 0–5)
WBC URINE: 2 /HPF

## 2021-01-01 PROCEDURE — 93975 VASCULAR STUDY: CPT | Mod: 26 | Performed by: RADIOLOGY

## 2021-01-01 PROCEDURE — 250N000011 HC RX IP 250 OP 636: Performed by: PHYSICIAN ASSISTANT

## 2021-01-01 PROCEDURE — 83615 LACTATE (LD) (LDH) ENZYME: CPT | Performed by: PHYSICIAN ASSISTANT

## 2021-01-01 PROCEDURE — 83605 ASSAY OF LACTIC ACID: CPT | Performed by: INTERNAL MEDICINE

## 2021-01-01 PROCEDURE — 99233 SBSQ HOSP IP/OBS HIGH 50: CPT | Performed by: INTERNAL MEDICINE

## 2021-01-01 PROCEDURE — 120N000002 HC R&B MED SURG/OB UMMC

## 2021-01-01 PROCEDURE — 250N000013 HC RX MED GY IP 250 OP 250 PS 637: Performed by: PHYSICIAN ASSISTANT

## 2021-01-01 PROCEDURE — 250N000013 HC RX MED GY IP 250 OP 250 PS 637: Performed by: STUDENT IN AN ORGANIZED HEALTH CARE EDUCATION/TRAINING PROGRAM

## 2021-01-01 PROCEDURE — 250N000013 HC RX MED GY IP 250 OP 250 PS 637: Performed by: INTERNAL MEDICINE

## 2021-01-01 PROCEDURE — 84100 ASSAY OF PHOSPHORUS: CPT | Performed by: PHYSICIAN ASSISTANT

## 2021-01-01 PROCEDURE — 85610 PROTHROMBIN TIME: CPT | Performed by: PHYSICIAN ASSISTANT

## 2021-01-01 PROCEDURE — 85384 FIBRINOGEN ACTIVITY: CPT | Performed by: PHYSICIAN ASSISTANT

## 2021-01-01 PROCEDURE — 250N000011 HC RX IP 250 OP 636: Performed by: INTERNAL MEDICINE

## 2021-01-01 PROCEDURE — 84550 ASSAY OF BLOOD/URIC ACID: CPT | Performed by: PHYSICIAN ASSISTANT

## 2021-01-01 PROCEDURE — 97535 SELF CARE MNGMENT TRAINING: CPT | Mod: GO

## 2021-01-01 PROCEDURE — 71045 X-RAY EXAM CHEST 1 VIEW: CPT

## 2021-01-01 PROCEDURE — 36592 COLLECT BLOOD FROM PICC: CPT | Performed by: PHYSICIAN ASSISTANT

## 2021-01-01 PROCEDURE — 83735 ASSAY OF MAGNESIUM: CPT | Performed by: PHYSICIAN ASSISTANT

## 2021-01-01 PROCEDURE — 80053 COMPREHEN METABOLIC PANEL: CPT | Performed by: PATHOLOGY

## 2021-01-01 PROCEDURE — 258N000003 HC RX IP 258 OP 636: Performed by: PHYSICIAN ASSISTANT

## 2021-01-01 PROCEDURE — 86704 HEP B CORE ANTIBODY TOTAL: CPT | Mod: 90 | Performed by: PATHOLOGY

## 2021-01-01 PROCEDURE — 87186 SC STD MICRODIL/AGAR DIL: CPT | Performed by: PHYSICIAN ASSISTANT

## 2021-01-01 PROCEDURE — 80053 COMPREHEN METABOLIC PANEL: CPT | Performed by: PHYSICIAN ASSISTANT

## 2021-01-01 PROCEDURE — 3E0R305 INTRODUCTION OF OTHER ANTINEOPLASTIC INTO SPINAL CANAL, PERCUTANEOUS APPROACH: ICD-10-PCS | Performed by: PEDIATRICS

## 2021-01-01 PROCEDURE — 86900 BLOOD TYPING SEROLOGIC ABO: CPT | Performed by: PHYSICIAN ASSISTANT

## 2021-01-01 PROCEDURE — 250N000012 HC RX MED GY IP 250 OP 636 PS 637

## 2021-01-01 PROCEDURE — 81001 URINALYSIS AUTO W/SCOPE: CPT | Performed by: PHYSICIAN ASSISTANT

## 2021-01-01 PROCEDURE — 36592 COLLECT BLOOD FROM PICC: CPT | Performed by: PEDIATRICS

## 2021-01-01 PROCEDURE — 82945 GLUCOSE OTHER FLUID: CPT | Performed by: PHYSICIAN ASSISTANT

## 2021-01-01 PROCEDURE — 272N000458 ZZ HC KIT, 5 FR DL BIOFLO OPEN ENDED PICC

## 2021-01-01 PROCEDURE — 250N000011 HC RX IP 250 OP 636: Performed by: STUDENT IN AN ORGANIZED HEALTH CARE EDUCATION/TRAINING PROGRAM

## 2021-01-01 PROCEDURE — 99233 SBSQ HOSP IP/OBS HIGH 50: CPT

## 2021-01-01 PROCEDURE — 83605 ASSAY OF LACTIC ACID: CPT

## 2021-01-01 PROCEDURE — 272N000201 ZZ HC ADHESIVE SKIN CLOSURE, DERMABOND

## 2021-01-01 PROCEDURE — 93005 ELECTROCARDIOGRAM TRACING: CPT

## 2021-01-01 PROCEDURE — 87641 MR-STAPH DNA AMP PROBE: CPT | Performed by: PHYSICIAN ASSISTANT

## 2021-01-01 PROCEDURE — 36592 COLLECT BLOOD FROM PICC: CPT

## 2021-01-01 PROCEDURE — 94640 AIRWAY INHALATION TREATMENT: CPT

## 2021-01-01 PROCEDURE — G0500 MOD SEDAT ENDO SERVICE >5YRS: HCPCS | Performed by: INTERNAL MEDICINE

## 2021-01-01 PROCEDURE — 93975 VASCULAR STUDY: CPT

## 2021-01-01 PROCEDURE — 36415 COLL VENOUS BLD VENIPUNCTURE: CPT | Performed by: PHYSICIAN ASSISTANT

## 2021-01-01 PROCEDURE — 85027 COMPLETE CBC AUTOMATED: CPT | Performed by: STUDENT IN AN ORGANIZED HEALTH CARE EDUCATION/TRAINING PROGRAM

## 2021-01-01 PROCEDURE — 250N000012 HC RX MED GY IP 250 OP 636 PS 637: Performed by: STUDENT IN AN ORGANIZED HEALTH CARE EDUCATION/TRAINING PROGRAM

## 2021-01-01 PROCEDURE — 86665 EPSTEIN-BARR CAPSID VCA: CPT | Performed by: PHYSICIAN ASSISTANT

## 2021-01-01 PROCEDURE — 62270 DX LMBR SPI PNXR: CPT | Performed by: PEDIATRICS

## 2021-01-01 PROCEDURE — 80048 BASIC METABOLIC PNL TOTAL CA: CPT | Performed by: STUDENT IN AN ORGANIZED HEALTH CARE EDUCATION/TRAINING PROGRAM

## 2021-01-01 PROCEDURE — 94642 AEROSOL INHALATION TREATMENT: CPT

## 2021-01-01 PROCEDURE — 99232 SBSQ HOSP IP/OBS MODERATE 35: CPT

## 2021-01-01 PROCEDURE — 80048 BASIC METABOLIC PNL TOTAL CA: CPT | Performed by: PHYSICIAN ASSISTANT

## 2021-01-01 PROCEDURE — 99232 SBSQ HOSP IP/OBS MODERATE 35: CPT | Mod: GC | Performed by: INTERNAL MEDICINE

## 2021-01-01 PROCEDURE — 85027 COMPLETE CBC AUTOMATED: CPT | Performed by: PHYSICIAN ASSISTANT

## 2021-01-01 PROCEDURE — 86706 HEP B SURFACE ANTIBODY: CPT | Performed by: PHYSICIAN ASSISTANT

## 2021-01-01 PROCEDURE — 93306 TTE W/DOPPLER COMPLETE: CPT | Mod: 26 | Performed by: INTERNAL MEDICINE

## 2021-01-01 PROCEDURE — 250N000011 HC RX IP 250 OP 636

## 2021-01-01 PROCEDURE — 86696 HERPES SIMPLEX TYPE 2 TEST: CPT | Performed by: PHYSICIAN ASSISTANT

## 2021-01-01 PROCEDURE — 84132 ASSAY OF SERUM POTASSIUM: CPT | Performed by: PEDIATRICS

## 2021-01-01 PROCEDURE — 89050 BODY FLUID CELL COUNT: CPT | Performed by: PHYSICIAN ASSISTANT

## 2021-01-01 PROCEDURE — 99232 SBSQ HOSP IP/OBS MODERATE 35: CPT | Performed by: STUDENT IN AN ORGANIZED HEALTH CARE EDUCATION/TRAINING PROGRAM

## 2021-01-01 PROCEDURE — 88184 FLOWCYTOMETRY/ TC 1 MARKER: CPT | Performed by: INTERNAL MEDICINE

## 2021-01-01 PROCEDURE — 87522 HEPATITIS C REVRS TRNSCRPJ: CPT | Performed by: PHYSICIAN ASSISTANT

## 2021-01-01 PROCEDURE — 250N000013 HC RX MED GY IP 250 OP 250 PS 637

## 2021-01-01 PROCEDURE — 06L38CZ OCCLUSION OF ESOPHAGEAL VEIN WITH EXTRALUMINAL DEVICE, VIA NATURAL OR ARTIFICIAL OPENING ENDOSCOPIC: ICD-10-PCS | Performed by: INTERNAL MEDICINE

## 2021-01-01 PROCEDURE — 97116 GAIT TRAINING THERAPY: CPT | Mod: GP

## 2021-01-01 PROCEDURE — 84100 ASSAY OF PHOSPHORUS: CPT | Performed by: STUDENT IN AN ORGANIZED HEALTH CARE EDUCATION/TRAINING PROGRAM

## 2021-01-01 PROCEDURE — 97530 THERAPEUTIC ACTIVITIES: CPT | Mod: GP

## 2021-01-01 PROCEDURE — 97140 MANUAL THERAPY 1/> REGIONS: CPT | Mod: GO | Performed by: OCCUPATIONAL THERAPIST

## 2021-01-01 PROCEDURE — 84157 ASSAY OF PROTEIN OTHER: CPT | Performed by: PHYSICIAN ASSISTANT

## 2021-01-01 PROCEDURE — 36415 COLL VENOUS BLD VENIPUNCTURE: CPT | Performed by: INTERNAL MEDICINE

## 2021-01-01 PROCEDURE — 87040 BLOOD CULTURE FOR BACTERIA: CPT

## 2021-01-01 PROCEDURE — 88185 FLOWCYTOMETRY/TC ADD-ON: CPT | Performed by: PHYSICIAN ASSISTANT

## 2021-01-01 PROCEDURE — 258N000003 HC RX IP 258 OP 636

## 2021-01-01 PROCEDURE — 85610 PROTHROMBIN TIME: CPT | Performed by: STUDENT IN AN ORGANIZED HEALTH CARE EDUCATION/TRAINING PROGRAM

## 2021-01-01 PROCEDURE — 99222 1ST HOSP IP/OBS MODERATE 55: CPT | Performed by: INTERNAL MEDICINE

## 2021-01-01 PROCEDURE — 99231 SBSQ HOSP IP/OBS SF/LOW 25: CPT | Performed by: PHYSICIAN ASSISTANT

## 2021-01-01 PROCEDURE — 85014 HEMATOCRIT: CPT | Performed by: PHYSICIAN ASSISTANT

## 2021-01-01 PROCEDURE — 85027 COMPLETE CBC AUTOMATED: CPT | Performed by: PATHOLOGY

## 2021-01-01 PROCEDURE — 96450 CHEMOTHERAPY INTO CNS: CPT | Performed by: PHYSICIAN ASSISTANT

## 2021-01-01 PROCEDURE — 71045 X-RAY EXAM CHEST 1 VIEW: CPT | Mod: 26 | Performed by: RADIOLOGY

## 2021-01-01 PROCEDURE — 93010 ELECTROCARDIOGRAM REPORT: CPT | Performed by: INTERNAL MEDICINE

## 2021-01-01 PROCEDURE — 87340 HEPATITIS B SURFACE AG IA: CPT | Performed by: PHYSICIAN ASSISTANT

## 2021-01-01 PROCEDURE — 250N000009 HC RX 250: Performed by: INTERNAL MEDICINE

## 2021-01-01 PROCEDURE — 258N000003 HC RX IP 258 OP 636: Performed by: STUDENT IN AN ORGANIZED HEALTH CARE EDUCATION/TRAINING PROGRAM

## 2021-01-01 PROCEDURE — 250N000009 HC RX 250: Performed by: STUDENT IN AN ORGANIZED HEALTH CARE EDUCATION/TRAINING PROGRAM

## 2021-01-01 PROCEDURE — 36415 COLL VENOUS BLD VENIPUNCTURE: CPT | Performed by: PATHOLOGY

## 2021-01-01 PROCEDURE — 43244 EGD VARICES LIGATION: CPT | Performed by: INTERNAL MEDICINE

## 2021-01-01 PROCEDURE — 99238 HOSP IP/OBS DSCHRG MGMT 30/<: CPT | Performed by: STUDENT IN AN ORGANIZED HEALTH CARE EDUCATION/TRAINING PROGRAM

## 2021-01-01 PROCEDURE — 272N000103 HC INTRODUCER MICRO SET

## 2021-01-01 PROCEDURE — 97161 PT EVAL LOW COMPLEX 20 MIN: CPT | Mod: GP

## 2021-01-01 PROCEDURE — 93306 TTE W/DOPPLER COMPLETE: CPT

## 2021-01-01 PROCEDURE — 36592 COLLECT BLOOD FROM PICC: CPT | Performed by: STUDENT IN AN ORGANIZED HEALTH CARE EDUCATION/TRAINING PROGRAM

## 2021-01-01 PROCEDURE — 84132 ASSAY OF SERUM POTASSIUM: CPT | Performed by: STUDENT IN AN ORGANIZED HEALTH CARE EDUCATION/TRAINING PROGRAM

## 2021-01-01 PROCEDURE — 36568 INSJ PICC <5 YR W/O IMAGING: CPT

## 2021-01-01 PROCEDURE — 97165 OT EVAL LOW COMPLEX 30 MIN: CPT | Mod: GO

## 2021-01-01 PROCEDURE — 83605 ASSAY OF LACTIC ACID: CPT | Performed by: PEDIATRICS

## 2021-01-01 PROCEDURE — 99233 SBSQ HOSP IP/OBS HIGH 50: CPT | Mod: GC | Performed by: INTERNAL MEDICINE

## 2021-01-01 PROCEDURE — 88184 FLOWCYTOMETRY/ TC 1 MARKER: CPT | Performed by: PHYSICIAN ASSISTANT

## 2021-01-01 PROCEDURE — 97530 THERAPEUTIC ACTIVITIES: CPT | Mod: GO

## 2021-01-01 PROCEDURE — 97110 THERAPEUTIC EXERCISES: CPT | Mod: GP | Performed by: REHABILITATION PRACTITIONER

## 2021-01-01 PROCEDURE — 84550 ASSAY OF BLOOD/URIC ACID: CPT | Performed by: STUDENT IN AN ORGANIZED HEALTH CARE EDUCATION/TRAINING PROGRAM

## 2021-01-01 PROCEDURE — 36592 COLLECT BLOOD FROM PICC: CPT | Mod: TC | Performed by: INTERNAL MEDICINE

## 2021-01-01 PROCEDURE — 36592 COLLECT BLOOD FROM PICC: CPT | Performed by: INTERNAL MEDICINE

## 2021-01-01 PROCEDURE — 83735 ASSAY OF MAGNESIUM: CPT | Performed by: STUDENT IN AN ORGANIZED HEALTH CARE EDUCATION/TRAINING PROGRAM

## 2021-01-01 PROCEDURE — 85384 FIBRINOGEN ACTIVITY: CPT | Performed by: STUDENT IN AN ORGANIZED HEALTH CARE EDUCATION/TRAINING PROGRAM

## 2021-01-01 PROCEDURE — 99223 1ST HOSP IP/OBS HIGH 75: CPT | Mod: AI | Performed by: STUDENT IN AN ORGANIZED HEALTH CARE EDUCATION/TRAINING PROGRAM

## 2021-01-01 PROCEDURE — 76981 USE PARENCHYMA: CPT | Performed by: RADIOLOGY

## 2021-01-01 PROCEDURE — 87205 SMEAR GRAM STAIN: CPT | Performed by: PHYSICIAN ASSISTANT

## 2021-01-01 PROCEDURE — 86708 HEPATITIS A ANTIBODY: CPT | Mod: 90 | Performed by: PATHOLOGY

## 2021-01-01 PROCEDURE — 99223 1ST HOSP IP/OBS HIGH 75: CPT | Mod: 25 | Performed by: INTERNAL MEDICINE

## 2021-01-01 PROCEDURE — 87040 BLOOD CULTURE FOR BACTERIA: CPT | Performed by: PHYSICIAN ASSISTANT

## 2021-01-01 PROCEDURE — U0003 INFECTIOUS AGENT DETECTION BY NUCLEIC ACID (DNA OR RNA); SEVERE ACUTE RESPIRATORY SYNDROME CORONAVIRUS 2 (SARS-COV-2) (CORONAVIRUS DISEASE [COVID-19]), AMPLIFIED PROBE TECHNIQUE, MAKING USE OF HIGH THROUGHPUT TECHNOLOGIES AS DESCRIBED BY CMS-2020-01-R: HCPCS | Performed by: STUDENT IN AN ORGANIZED HEALTH CARE EDUCATION/TRAINING PROGRAM

## 2021-01-01 PROCEDURE — 87086 URINE CULTURE/COLONY COUNT: CPT | Performed by: PHYSICIAN ASSISTANT

## 2021-01-01 PROCEDURE — 87389 HIV-1 AG W/HIV-1&-2 AB AG IA: CPT | Performed by: PHYSICIAN ASSISTANT

## 2021-01-01 PROCEDURE — 82374 ASSAY BLOOD CARBON DIOXIDE: CPT | Performed by: PHYSICIAN ASSISTANT

## 2021-01-01 PROCEDURE — 86644 CMV ANTIBODY: CPT | Performed by: PHYSICIAN ASSISTANT

## 2021-01-01 PROCEDURE — 86704 HEP B CORE ANTIBODY TOTAL: CPT | Performed by: PHYSICIAN ASSISTANT

## 2021-01-01 PROCEDURE — 83605 ASSAY OF LACTIC ACID: CPT | Performed by: NURSE PRACTITIONER

## 2021-01-01 PROCEDURE — 87149 DNA/RNA DIRECT PROBE: CPT | Performed by: PHYSICIAN ASSISTANT

## 2021-01-01 PROCEDURE — 258N000003 HC RX IP 258 OP 636: Performed by: INTERNAL MEDICINE

## 2021-01-01 PROCEDURE — 99204 OFFICE O/P NEW MOD 45 MIN: CPT | Mod: 95 | Performed by: INTERNAL MEDICINE

## 2021-01-01 PROCEDURE — 99000 SPECIMEN HANDLING OFFICE-LAB: CPT | Performed by: PATHOLOGY

## 2021-01-01 PROCEDURE — 88321 CONSLTJ&REPRT SLD PREP ELSWR: CPT | Performed by: PATHOLOGY

## 2021-01-01 PROCEDURE — 97530 THERAPEUTIC ACTIVITIES: CPT | Mod: GP | Performed by: REHABILITATION PRACTITIONER

## 2021-01-01 PROCEDURE — 82040 ASSAY OF SERUM ALBUMIN: CPT | Performed by: PHYSICIAN ASSISTANT

## 2021-01-01 PROCEDURE — 88185 FLOWCYTOMETRY/TC ADD-ON: CPT | Performed by: INTERNAL MEDICINE

## 2021-01-01 PROCEDURE — P9012 CRYOPRECIPITATE EACH UNIT: HCPCS | Performed by: STUDENT IN AN ORGANIZED HEALTH CARE EDUCATION/TRAINING PROGRAM

## 2021-01-01 PROCEDURE — 36415 COLL VENOUS BLD VENIPUNCTURE: CPT | Performed by: OBSTETRICS & GYNECOLOGY

## 2021-01-01 PROCEDURE — 97168 OT RE-EVAL EST PLAN CARE: CPT | Mod: GO | Performed by: OCCUPATIONAL THERAPIST

## 2021-01-01 PROCEDURE — U0005 INFEC AGEN DETEC AMPLI PROBE: HCPCS | Performed by: PHYSICIAN ASSISTANT

## 2021-01-01 PROCEDURE — 999N000044 HC STATISTIC CVC DRESSING CHANGE

## 2021-01-01 PROCEDURE — 999N000065 XR CHEST PORT 1 VIEW

## 2021-01-01 PROCEDURE — 87040 BLOOD CULTURE FOR BACTERIA: CPT | Performed by: OBSTETRICS & GYNECOLOGY

## 2021-01-01 PROCEDURE — 258N000003 HC RX IP 258 OP 636: Performed by: NURSE PRACTITIONER

## 2021-01-01 PROCEDURE — 87517 HEPATITIS B DNA QUANT: CPT | Performed by: PHYSICIAN ASSISTANT

## 2021-01-01 PROCEDURE — 97535 SELF CARE MNGMENT TRAINING: CPT | Mod: GO | Performed by: OCCUPATIONAL THERAPIST

## 2021-01-01 PROCEDURE — 85610 PROTHROMBIN TIME: CPT | Performed by: PATHOLOGY

## 2021-01-01 PROCEDURE — 86706 HEP B SURFACE ANTIBODY: CPT | Mod: 90 | Performed by: PATHOLOGY

## 2021-01-01 PROCEDURE — 86923 COMPATIBILITY TEST ELECTRIC: CPT | Performed by: PHYSICIAN ASSISTANT

## 2021-01-01 PROCEDURE — P9016 RBC LEUKOCYTES REDUCED: HCPCS | Performed by: PHYSICIAN ASSISTANT

## 2021-01-01 RX ORDER — POLYETHYLENE GLYCOL 3350 17 G/17G
34 POWDER, FOR SOLUTION ORAL 2 TIMES DAILY
Status: DISCONTINUED | OUTPATIENT
Start: 2021-01-01 | End: 2021-01-01

## 2021-01-01 RX ORDER — VELPATASVIR AND SOFOSBUVIR 100; 400 MG/1; MG/1
1 TABLET, FILM COATED ORAL DAILY
Status: DISCONTINUED | OUTPATIENT
Start: 2021-01-01 | End: 2021-01-01

## 2021-01-01 RX ORDER — HEPARIN SODIUM,PORCINE 10 UNIT/ML
5-20 VIAL (ML) INTRAVENOUS
Status: DISCONTINUED | OUTPATIENT
Start: 2021-01-01 | End: 2021-01-01 | Stop reason: HOSPADM

## 2021-01-01 RX ORDER — PREDNISONE 50 MG/1
30 TABLET ORAL 2 TIMES DAILY
Status: DISCONTINUED | OUTPATIENT
Start: 2021-01-01 | End: 2021-01-01

## 2021-01-01 RX ORDER — MORPHINE SULFATE 10 MG/5ML
5 SOLUTION ORAL
Qty: 40 ML | Refills: 0 | Status: SHIPPED | OUTPATIENT
Start: 2021-01-01 | End: 2021-07-31

## 2021-01-01 RX ORDER — DEXAMETHASONE 4 MG/1
20 TABLET ORAL DAILY
Status: DISCONTINUED | OUTPATIENT
Start: 2021-01-01 | End: 2021-01-01

## 2021-01-01 RX ORDER — METHYLPHENIDATE HYDROCHLORIDE 5 MG/1
5 TABLET ORAL 2 TIMES DAILY PRN
Qty: 30 TABLET | Refills: 0 | Status: SHIPPED | OUTPATIENT
Start: 2021-01-01

## 2021-01-01 RX ORDER — PENTAMIDINE ISETHIONATE 300 MG/300MG
300 INHALANT RESPIRATORY (INHALATION)
Status: DISCONTINUED | OUTPATIENT
Start: 2021-01-01 | End: 2021-01-01

## 2021-01-01 RX ORDER — LORAZEPAM 1 MG/1
1 TABLET ORAL
Status: DISCONTINUED | OUTPATIENT
Start: 2021-01-01 | End: 2021-01-01

## 2021-01-01 RX ORDER — ATROPINE SULFATE 10 MG/ML
1-2 SOLUTION/ DROPS OPHTHALMIC 4 TIMES DAILY PRN
Qty: 15 ML | Refills: 1 | Status: SHIPPED | OUTPATIENT
Start: 2021-01-01

## 2021-01-01 RX ORDER — TRAZODONE HYDROCHLORIDE 50 MG/1
50 TABLET, FILM COATED ORAL
Status: DISCONTINUED | OUTPATIENT
Start: 2021-01-01 | End: 2021-01-01

## 2021-01-01 RX ORDER — LACTULOSE 10 G/10G
20 SOLUTION ORAL DAILY
Status: DISCONTINUED | OUTPATIENT
Start: 2021-01-01 | End: 2021-01-01

## 2021-01-01 RX ORDER — LORAZEPAM 2 MG/ML
.5-1 INJECTION INTRAMUSCULAR EVERY 6 HOURS PRN
Status: DISCONTINUED | OUTPATIENT
Start: 2021-01-01 | End: 2021-01-01

## 2021-01-01 RX ORDER — ALBUTEROL SULFATE 0.83 MG/ML
2.5 SOLUTION RESPIRATORY (INHALATION)
Status: DISCONTINUED | OUTPATIENT
Start: 2021-01-01 | End: 2021-01-01 | Stop reason: HOSPADM

## 2021-01-01 RX ORDER — DIPHENHYDRAMINE HYDROCHLORIDE 50 MG/ML
50 INJECTION INTRAMUSCULAR; INTRAVENOUS
Status: DISCONTINUED | OUTPATIENT
Start: 2021-01-01 | End: 2021-01-01

## 2021-01-01 RX ORDER — CALCIUM ACETATE 667 MG/1
667 CAPSULE ORAL
Status: DISCONTINUED | OUTPATIENT
Start: 2021-01-01 | End: 2021-01-01

## 2021-01-01 RX ORDER — LORAZEPAM 0.5 MG/1
.5-1 TABLET ORAL EVERY 6 HOURS PRN
Status: DISCONTINUED | OUTPATIENT
Start: 2021-01-01 | End: 2021-01-01

## 2021-01-01 RX ORDER — HALOPERIDOL 2 MG/ML
1 SOLUTION ORAL EVERY 6 HOURS PRN
Qty: 15 ML | Refills: 1 | Status: SHIPPED | OUTPATIENT
Start: 2021-01-01

## 2021-01-01 RX ORDER — LEVOFLOXACIN 250 MG/1
250 TABLET, FILM COATED ORAL DAILY
Status: DISCONTINUED | OUTPATIENT
Start: 2021-01-01 | End: 2021-01-01

## 2021-01-01 RX ORDER — METHYLPHENIDATE HYDROCHLORIDE 5 MG/1
5 TABLET ORAL 2 TIMES DAILY PRN
Status: DISCONTINUED | OUTPATIENT
Start: 2021-01-01 | End: 2021-01-01 | Stop reason: HOSPADM

## 2021-01-01 RX ORDER — VELPATASVIR AND SOFOSBUVIR 100; 400 MG/1; MG/1
1 TABLET, FILM COATED ORAL DAILY
Qty: 28 TABLET | Refills: 2 | Status: ON HOLD | OUTPATIENT
Start: 2021-01-01 | End: 2021-01-01

## 2021-01-01 RX ORDER — METHYLPHENIDATE HYDROCHLORIDE 5 MG/1
5 TABLET ORAL 2 TIMES DAILY
Status: DISCONTINUED | OUTPATIENT
Start: 2021-01-01 | End: 2021-01-01

## 2021-01-01 RX ORDER — ACYCLOVIR 400 MG/1
400 TABLET ORAL 2 TIMES DAILY
Status: DISCONTINUED | OUTPATIENT
Start: 2021-01-01 | End: 2021-01-01

## 2021-01-01 RX ORDER — LACTULOSE 10 G/15ML
10-20 SOLUTION ORAL DAILY
Status: DISCONTINUED | OUTPATIENT
Start: 2021-01-01 | End: 2021-01-01

## 2021-01-01 RX ORDER — ALLOPURINOL 300 MG/1
300 TABLET ORAL DAILY
Status: DISCONTINUED | OUTPATIENT
Start: 2021-01-01 | End: 2021-01-01

## 2021-01-01 RX ORDER — CALCIUM ACETATE 667 MG/1
1334 CAPSULE ORAL
Status: DISCONTINUED | OUTPATIENT
Start: 2021-01-01 | End: 2021-01-01

## 2021-01-01 RX ORDER — HYDROXYZINE HYDROCHLORIDE 25 MG/1
50 TABLET, FILM COATED ORAL EVERY 6 HOURS PRN
Status: DISCONTINUED | OUTPATIENT
Start: 2021-01-01 | End: 2021-01-01

## 2021-01-01 RX ORDER — METHYLPREDNISOLONE SODIUM SUCCINATE 125 MG/2ML
125 INJECTION, POWDER, LYOPHILIZED, FOR SOLUTION INTRAMUSCULAR; INTRAVENOUS
Status: DISCONTINUED | OUTPATIENT
Start: 2021-01-01 | End: 2021-01-01

## 2021-01-01 RX ORDER — PANTOPRAZOLE SODIUM 40 MG/1
40 TABLET, DELAYED RELEASE ORAL EVERY EVENING
Status: DISCONTINUED | OUTPATIENT
Start: 2021-01-01 | End: 2021-01-01

## 2021-01-01 RX ORDER — AMLODIPINE BESYLATE 5 MG/1
5 TABLET ORAL DAILY
Status: DISCONTINUED | OUTPATIENT
Start: 2021-01-01 | End: 2021-01-01

## 2021-01-01 RX ORDER — ACETAMINOPHEN 325 MG/1
650 TABLET ORAL EVERY 4 HOURS PRN
Status: DISCONTINUED | OUTPATIENT
Start: 2021-01-01 | End: 2021-01-01 | Stop reason: HOSPADM

## 2021-01-01 RX ORDER — LACTULOSE 10 G/10G
20 SOLUTION ORAL 2 TIMES DAILY
Qty: 30 PACKET | Refills: 1 | Status: SHIPPED | OUTPATIENT
Start: 2021-01-01

## 2021-01-01 RX ORDER — DEXAMETHASONE 4 MG/1
20 TABLET ORAL EVERY 12 HOURS SCHEDULED
Status: DISCONTINUED | OUTPATIENT
Start: 2021-01-01 | End: 2021-01-01

## 2021-01-01 RX ORDER — UREA 10 %
1000 LOTION (ML) TOPICAL WEEKLY
Status: DISCONTINUED | OUTPATIENT
Start: 2021-01-01 | End: 2021-01-01

## 2021-01-01 RX ORDER — AMOXICILLIN 250 MG
1 CAPSULE ORAL 2 TIMES DAILY PRN
Status: DISCONTINUED | OUTPATIENT
Start: 2021-01-01 | End: 2021-01-01 | Stop reason: HOSPADM

## 2021-01-01 RX ORDER — LORAZEPAM 0.5 MG/1
0.5 TABLET ORAL
Qty: 30 TABLET | Refills: 5 | Status: SHIPPED | OUTPATIENT
Start: 2021-01-01

## 2021-01-01 RX ORDER — LIDOCAINE 40 MG/G
CREAM TOPICAL
Status: ACTIVE | OUTPATIENT
Start: 2021-01-01 | End: 2021-01-01

## 2021-01-01 RX ORDER — POLYETHYLENE GLYCOL 3350 17 G/17G
17 POWDER, FOR SOLUTION ORAL 2 TIMES DAILY
Status: DISCONTINUED | OUTPATIENT
Start: 2021-01-01 | End: 2021-01-01

## 2021-01-01 RX ORDER — LEVOFLOXACIN 250 MG/1
500 TABLET, FILM COATED ORAL ONCE
Status: COMPLETED | OUTPATIENT
Start: 2021-01-01 | End: 2021-01-01

## 2021-01-01 RX ORDER — PROCHLORPERAZINE MALEATE 5 MG
5-10 TABLET ORAL EVERY 6 HOURS PRN
Status: DISCONTINUED | OUTPATIENT
Start: 2021-01-01 | End: 2021-01-01

## 2021-01-01 RX ORDER — TRAZODONE HYDROCHLORIDE 50 MG/1
100 TABLET, FILM COATED ORAL
Status: DISCONTINUED | OUTPATIENT
Start: 2021-01-01 | End: 2021-01-01

## 2021-01-01 RX ORDER — TRAZODONE HYDROCHLORIDE 50 MG/1
50 TABLET, FILM COATED ORAL AT BEDTIME
Status: DISCONTINUED | OUTPATIENT
Start: 2021-01-01 | End: 2021-01-01

## 2021-01-01 RX ORDER — UREA 10 %
1000 LOTION (ML) TOPICAL DAILY
Status: COMPLETED | OUTPATIENT
Start: 2021-01-01 | End: 2021-01-01

## 2021-01-01 RX ORDER — DOXYCYCLINE 100 MG/1
100 CAPSULE ORAL EVERY 12 HOURS
Qty: 20 CAPSULE | Refills: 0 | Status: SHIPPED | OUTPATIENT
Start: 2021-01-01 | End: 2021-08-07

## 2021-01-01 RX ORDER — EPINEPHRINE 1 MG/ML
0.3 INJECTION, SOLUTION, CONCENTRATE INTRAVENOUS EVERY 5 MIN PRN
Status: DISCONTINUED | OUTPATIENT
Start: 2021-01-01 | End: 2021-01-01

## 2021-01-01 RX ORDER — PREDNISONE 20 MG/1
40 TABLET ORAL
Status: ON HOLD | COMMUNITY
Start: 2021-01-01 | End: 2021-01-01

## 2021-01-01 RX ORDER — NALOXONE HYDROCHLORIDE 0.4 MG/ML
0.2 INJECTION, SOLUTION INTRAMUSCULAR; INTRAVENOUS; SUBCUTANEOUS
Status: DISCONTINUED | OUTPATIENT
Start: 2021-01-01 | End: 2021-01-01 | Stop reason: HOSPADM

## 2021-01-01 RX ORDER — POLYETHYLENE GLYCOL 3350 17 G/17G
17 POWDER, FOR SOLUTION ORAL DAILY PRN
Status: DISCONTINUED | OUTPATIENT
Start: 2021-01-01 | End: 2021-01-01

## 2021-01-01 RX ORDER — AMLODIPINE BESYLATE 5 MG/1
5 TABLET ORAL
Status: ON HOLD | COMMUNITY
Start: 2021-01-01 | End: 2021-01-01

## 2021-01-01 RX ORDER — MINERAL OIL/HYDROPHIL PETROLAT
OINTMENT (GRAM) TOPICAL
Status: DISCONTINUED | OUTPATIENT
Start: 2021-01-01 | End: 2021-01-01 | Stop reason: HOSPADM

## 2021-01-01 RX ORDER — LACTULOSE 10 G/15ML
20 SOLUTION ORAL DAILY
Status: DISCONTINUED | OUTPATIENT
Start: 2021-01-01 | End: 2021-01-01

## 2021-01-01 RX ORDER — SULFAMETHOXAZOLE AND TRIMETHOPRIM 400; 80 MG/1; MG/1
1 TABLET ORAL 2 TIMES DAILY
Status: DISCONTINUED | OUTPATIENT
Start: 2021-01-01 | End: 2021-01-01

## 2021-01-01 RX ORDER — FENTANYL CITRATE 50 UG/ML
INJECTION, SOLUTION INTRAMUSCULAR; INTRAVENOUS PRN
Status: COMPLETED | OUTPATIENT
Start: 2021-01-01 | End: 2021-01-01

## 2021-01-01 RX ORDER — ACETAMINOPHEN 650 MG/1
650 SUPPOSITORY RECTAL EVERY 4 HOURS PRN
Qty: 2 SUPPOSITORY | Refills: 1 | Status: SHIPPED | OUTPATIENT
Start: 2021-01-01

## 2021-01-01 RX ORDER — LACTULOSE 10 G/15ML
10 SOLUTION ORAL DAILY
Status: DISCONTINUED | OUTPATIENT
Start: 2021-01-01 | End: 2021-01-01

## 2021-01-01 RX ORDER — LORAZEPAM 2 MG/ML
.5-1 INJECTION INTRAMUSCULAR
Status: DISCONTINUED | OUTPATIENT
Start: 2021-01-01 | End: 2021-01-01 | Stop reason: HOSPADM

## 2021-01-01 RX ORDER — LEVOFLOXACIN 750 MG/1
750 TABLET, FILM COATED ORAL DAILY
Status: DISPENSED | OUTPATIENT
Start: 2021-01-01 | End: 2021-01-01

## 2021-01-01 RX ORDER — LAMIVUDINE 100 MG/1
100 TABLET, FILM COATED ORAL DAILY
Status: DISCONTINUED | OUTPATIENT
Start: 2021-01-01 | End: 2021-01-01

## 2021-01-01 RX ORDER — ALBUTEROL SULFATE 90 UG/1
1-2 AEROSOL, METERED RESPIRATORY (INHALATION)
Status: DISCONTINUED | OUTPATIENT
Start: 2021-01-01 | End: 2021-01-01

## 2021-01-01 RX ORDER — LORAZEPAM 0.5 MG/1
.5-1 TABLET ORAL
Status: DISCONTINUED | OUTPATIENT
Start: 2021-01-01 | End: 2021-01-01 | Stop reason: HOSPADM

## 2021-01-01 RX ORDER — AMOXICILLIN 250 MG
2 CAPSULE ORAL 2 TIMES DAILY PRN
Status: DISCONTINUED | OUTPATIENT
Start: 2021-01-01 | End: 2021-01-01 | Stop reason: HOSPADM

## 2021-01-01 RX ORDER — ONDANSETRON 8 MG/1
16 TABLET, FILM COATED ORAL
Status: DISCONTINUED | OUTPATIENT
Start: 2021-01-01 | End: 2021-01-01

## 2021-01-01 RX ORDER — HEPARIN SODIUM,PORCINE 10 UNIT/ML
5-20 VIAL (ML) INTRAVENOUS EVERY 24 HOURS
Status: DISCONTINUED | OUTPATIENT
Start: 2021-01-01 | End: 2021-01-01 | Stop reason: HOSPADM

## 2021-01-01 RX ORDER — CARBOXYMETHYLCELLULOSE SODIUM 5 MG/ML
1-2 SOLUTION/ DROPS OPHTHALMIC
Status: DISCONTINUED | OUTPATIENT
Start: 2021-01-01 | End: 2021-01-01 | Stop reason: HOSPADM

## 2021-01-01 RX ORDER — CALCIUM ACETATE 667 MG/1
2001 CAPSULE ORAL
Status: DISCONTINUED | OUTPATIENT
Start: 2021-01-01 | End: 2021-01-01

## 2021-01-01 RX ORDER — MEPERIDINE HYDROCHLORIDE 25 MG/ML
25 INJECTION INTRAMUSCULAR; INTRAVENOUS; SUBCUTANEOUS EVERY 30 MIN PRN
Status: DISCONTINUED | OUTPATIENT
Start: 2021-01-01 | End: 2021-01-01

## 2021-01-01 RX ORDER — NALOXONE HYDROCHLORIDE 0.4 MG/ML
0.1 INJECTION, SOLUTION INTRAMUSCULAR; INTRAVENOUS; SUBCUTANEOUS
Status: DISCONTINUED | OUTPATIENT
Start: 2021-01-01 | End: 2021-01-01 | Stop reason: HOSPADM

## 2021-01-01 RX ORDER — CYANOCOBALAMIN 1000 UG/ML
1000 INJECTION, SOLUTION INTRAMUSCULAR; SUBCUTANEOUS WEEKLY
Status: DISCONTINUED | OUTPATIENT
Start: 2021-01-01 | End: 2021-01-01

## 2021-01-01 RX ORDER — LORAZEPAM 2 MG/ML
1 INJECTION INTRAMUSCULAR
Status: DISCONTINUED | OUTPATIENT
Start: 2021-01-01 | End: 2021-01-01

## 2021-01-01 RX ORDER — PROCHLORPERAZINE MALEATE 5 MG
5 TABLET ORAL EVERY 6 HOURS PRN
Status: DISCONTINUED | OUTPATIENT
Start: 2021-01-01 | End: 2021-01-01 | Stop reason: HOSPADM

## 2021-01-01 RX ORDER — DOXYCYCLINE 100 MG/1
100 CAPSULE ORAL EVERY 12 HOURS SCHEDULED
Status: DISCONTINUED | OUTPATIENT
Start: 2021-01-01 | End: 2021-01-01 | Stop reason: HOSPADM

## 2021-01-01 RX ORDER — GLYCOPYRROLATE 1 MG/1
2 TABLET ORAL EVERY 4 HOURS PRN
Status: DISCONTINUED | OUTPATIENT
Start: 2021-01-01 | End: 2021-01-01 | Stop reason: HOSPADM

## 2021-01-01 RX ORDER — ONDANSETRON 4 MG/1
8 TABLET, ORALLY DISINTEGRATING ORAL EVERY 8 HOURS PRN
Status: DISCONTINUED | OUTPATIENT
Start: 2021-01-01 | End: 2021-01-01 | Stop reason: HOSPADM

## 2021-01-01 RX ORDER — LINEZOLID 600 MG/1
600 TABLET, FILM COATED ORAL EVERY 12 HOURS SCHEDULED
Status: DISCONTINUED | OUTPATIENT
Start: 2021-01-01 | End: 2021-01-01

## 2021-01-01 RX ORDER — MORPHINE SULFATE 2 MG/ML
1-2 INJECTION, SOLUTION INTRAMUSCULAR; INTRAVENOUS
Status: DISCONTINUED | OUTPATIENT
Start: 2021-01-01 | End: 2021-01-01 | Stop reason: HOSPADM

## 2021-01-01 RX ORDER — ONDANSETRON 2 MG/ML
8 INJECTION INTRAMUSCULAR; INTRAVENOUS EVERY 8 HOURS PRN
Status: DISCONTINUED | OUTPATIENT
Start: 2021-01-01 | End: 2021-01-01 | Stop reason: HOSPADM

## 2021-01-01 RX ORDER — ESCITALOPRAM OXALATE 10 MG/1
10 TABLET ORAL DAILY
Status: DISCONTINUED | OUTPATIENT
Start: 2021-08-02 | End: 2021-01-01

## 2021-01-01 RX ORDER — BISACODYL 10 MG
10 SUPPOSITORY, RECTAL RECTAL DAILY PRN
Qty: 2 SUPPOSITORY | Refills: 1 | Status: SHIPPED | OUTPATIENT
Start: 2021-01-01

## 2021-01-01 RX ORDER — ONDANSETRON 8 MG/1
8 TABLET, FILM COATED ORAL EVERY 8 HOURS PRN
Status: DISCONTINUED | OUTPATIENT
Start: 2021-01-01 | End: 2021-01-01 | Stop reason: HOSPADM

## 2021-01-01 RX ORDER — PANTOPRAZOLE SODIUM 40 MG/1
40 TABLET, DELAYED RELEASE ORAL EVERY EVENING
Status: DISCONTINUED | OUTPATIENT
Start: 2021-01-01 | End: 2021-01-01 | Stop reason: CLARIF

## 2021-01-01 RX ORDER — PANTOPRAZOLE SODIUM 40 MG/1
40 TABLET, DELAYED RELEASE ORAL DAILY
Status: DISCONTINUED | OUTPATIENT
Start: 2021-01-01 | End: 2021-01-01

## 2021-01-01 RX ORDER — HYDROCODONE BITARTRATE AND ACETAMINOPHEN 5; 325 MG/1; MG/1
1-2 TABLET ORAL
Status: ON HOLD | COMMUNITY
Start: 2021-01-01 | End: 2021-01-01

## 2021-01-01 RX ORDER — LIDOCAINE 40 MG/G
CREAM TOPICAL
Status: DISCONTINUED | OUTPATIENT
Start: 2021-01-01 | End: 2021-01-01 | Stop reason: HOSPADM

## 2021-01-01 RX ORDER — ESCITALOPRAM OXALATE 10 MG/1
10 TABLET ORAL DAILY
Status: DISCONTINUED | OUTPATIENT
Start: 2021-01-01 | End: 2021-01-01

## 2021-01-01 RX ORDER — LACTULOSE 10 G/15ML
20 SOLUTION ORAL 2 TIMES DAILY
Status: DISCONTINUED | OUTPATIENT
Start: 2021-01-01 | End: 2021-01-01

## 2021-01-01 RX ORDER — ALBUTEROL SULFATE 0.83 MG/ML
2.5 SOLUTION RESPIRATORY (INHALATION)
Status: DISCONTINUED | OUTPATIENT
Start: 2021-01-01 | End: 2021-01-01

## 2021-01-01 RX ORDER — ESCITALOPRAM OXALATE 5 MG/1
5 TABLET ORAL DAILY
Status: DISCONTINUED | OUTPATIENT
Start: 2021-01-01 | End: 2021-01-01

## 2021-01-01 RX ORDER — CYANOCOBALAMIN 1000 UG/ML
1000 INJECTION, SOLUTION INTRAMUSCULAR; SUBCUTANEOUS DAILY
Status: DISCONTINUED | OUTPATIENT
Start: 2021-01-01 | End: 2021-01-01

## 2021-01-01 RX ADMIN — ACYCLOVIR 400 MG: 400 TABLET ORAL at 09:44

## 2021-01-01 RX ADMIN — CALCIUM ACETATE 667 MG: 667 CAPSULE ORAL at 12:20

## 2021-01-01 RX ADMIN — Medication 5 ML: at 05:35

## 2021-01-01 RX ADMIN — Medication 5 ML: at 18:24

## 2021-01-01 RX ADMIN — CALCIUM ACETATE 667 MG: 667 CAPSULE ORAL at 09:24

## 2021-01-01 RX ADMIN — Medication 5 ML: at 18:08

## 2021-01-01 RX ADMIN — Medication 5 ML: at 20:57

## 2021-01-01 RX ADMIN — PREDNISONE 50 MG: 50 TABLET ORAL at 20:34

## 2021-01-01 RX ADMIN — Medication 5 ML: at 00:30

## 2021-01-01 RX ADMIN — LEVOFLOXACIN 750 MG: 750 TABLET, FILM COATED ORAL at 10:26

## 2021-01-01 RX ADMIN — SODIUM CHLORIDE 500 ML: 9 INJECTION, SOLUTION INTRAVENOUS at 07:44

## 2021-01-01 RX ADMIN — AMLODIPINE BESYLATE 5 MG: 5 TABLET ORAL at 09:44

## 2021-01-01 RX ADMIN — Medication 10 ML: at 10:56

## 2021-01-01 RX ADMIN — POLYETHYLENE GLYCOL 3350 34 G: 17 POWDER, FOR SOLUTION ORAL at 09:36

## 2021-01-01 RX ADMIN — FENTANYL CITRATE 50 MCG: 50 INJECTION, SOLUTION INTRAMUSCULAR; INTRAVENOUS at 08:23

## 2021-01-01 RX ADMIN — LAMIVUDINE 100 MG: 100 TABLET, FILM COATED ORAL at 09:39

## 2021-01-01 RX ADMIN — CALCIUM ACETATE 1334 MG: 667 CAPSULE ORAL at 09:43

## 2021-01-01 RX ADMIN — LEVOFLOXACIN 250 MG: 250 TABLET, FILM COATED ORAL at 10:54

## 2021-01-01 RX ADMIN — ALLOPURINOL 300 MG: 300 TABLET ORAL at 08:56

## 2021-01-01 RX ADMIN — MICAFUNGIN SODIUM 50 MG: 50 INJECTION, POWDER, LYOPHILIZED, FOR SOLUTION INTRAVENOUS at 09:23

## 2021-01-01 RX ADMIN — MICAFUNGIN SODIUM 50 MG: 50 INJECTION, POWDER, LYOPHILIZED, FOR SOLUTION INTRAVENOUS at 13:21

## 2021-01-01 RX ADMIN — Medication 5 ML: at 10:52

## 2021-01-01 RX ADMIN — ACETAMINOPHEN 650 MG: 325 TABLET, FILM COATED ORAL at 10:49

## 2021-01-01 RX ADMIN — Medication 5 ML: at 09:39

## 2021-01-01 RX ADMIN — PREDNISONE 50 MG: 50 TABLET ORAL at 09:35

## 2021-01-01 RX ADMIN — CYANOCOBALAMIN 1000 MCG: 1000 INJECTION, SOLUTION INTRAMUSCULAR at 08:48

## 2021-01-01 RX ADMIN — VINCRISTINE SULFATE 2 MG: 1 INJECTION, SOLUTION INTRAVENOUS at 20:38

## 2021-01-01 RX ADMIN — Medication 10 ML: at 17:39

## 2021-01-01 RX ADMIN — Medication 5 ML: at 09:41

## 2021-01-01 RX ADMIN — Medication 10 ML: at 21:05

## 2021-01-01 RX ADMIN — ACYCLOVIR 400 MG: 400 TABLET ORAL at 20:01

## 2021-01-01 RX ADMIN — Medication 5 ML: at 09:26

## 2021-01-01 RX ADMIN — ACYCLOVIR 400 MG: 400 TABLET ORAL at 20:34

## 2021-01-01 RX ADMIN — Medication 5 ML: at 06:35

## 2021-01-01 RX ADMIN — CALCIUM ACETATE 1334 MG: 667 CAPSULE ORAL at 09:23

## 2021-01-01 RX ADMIN — ALLOPURINOL 300 MG: 300 TABLET ORAL at 09:23

## 2021-01-01 RX ADMIN — AMLODIPINE BESYLATE 5 MG: 5 TABLET ORAL at 09:22

## 2021-01-01 RX ADMIN — AMLODIPINE BESYLATE 5 MG: 5 TABLET ORAL at 12:42

## 2021-01-01 RX ADMIN — CALCIUM ACETATE 1334 MG: 667 CAPSULE ORAL at 17:45

## 2021-01-01 RX ADMIN — VELPATASVIR AND SOFOSBUVIR 1 TABLET: 100; 400 TABLET, FILM COATED ORAL at 09:41

## 2021-01-01 RX ADMIN — Medication 5 ML: at 09:35

## 2021-01-01 RX ADMIN — CALCIUM ACETATE 1334 MG: 667 CAPSULE ORAL at 11:37

## 2021-01-01 RX ADMIN — Medication 5 ML: at 13:17

## 2021-01-01 RX ADMIN — ACYCLOVIR 400 MG: 400 TABLET ORAL at 09:22

## 2021-01-01 RX ADMIN — ACYCLOVIR 400 MG: 400 TABLET ORAL at 09:23

## 2021-01-01 RX ADMIN — MICAFUNGIN SODIUM 50 MG: 50 INJECTION, POWDER, LYOPHILIZED, FOR SOLUTION INTRAVENOUS at 13:02

## 2021-01-01 RX ADMIN — LACTULOSE 20 G: 20 SOLUTION ORAL at 12:20

## 2021-01-01 RX ADMIN — PREDNISONE 50 MG: 50 TABLET ORAL at 09:12

## 2021-01-01 RX ADMIN — LACTULOSE 10 G: 20 SOLUTION ORAL at 09:35

## 2021-01-01 RX ADMIN — Medication 5 ML: at 16:17

## 2021-01-01 RX ADMIN — Medication 5 ML: at 09:27

## 2021-01-01 RX ADMIN — ONDANSETRON 8 MG: 2 INJECTION INTRAMUSCULAR; INTRAVENOUS at 17:52

## 2021-01-01 RX ADMIN — LEVOFLOXACIN 250 MG: 250 TABLET, FILM COATED ORAL at 08:56

## 2021-01-01 RX ADMIN — ACETAMINOPHEN 650 MG: 325 TABLET, FILM COATED ORAL at 02:09

## 2021-01-01 RX ADMIN — ESCITALOPRAM OXALATE 10 MG: 10 TABLET ORAL at 12:30

## 2021-01-01 RX ADMIN — OMEPRAZOLE 20 MG: 20 CAPSULE, DELAYED RELEASE ORAL at 12:40

## 2021-01-01 RX ADMIN — OMEPRAZOLE 20 MG: 20 CAPSULE, DELAYED RELEASE ORAL at 10:39

## 2021-01-01 RX ADMIN — MIDAZOLAM 2 MG: 1 INJECTION INTRAMUSCULAR; INTRAVENOUS at 08:08

## 2021-01-01 RX ADMIN — ALLOPURINOL 300 MG: 300 TABLET ORAL at 12:46

## 2021-01-01 RX ADMIN — DEXAMETHASONE 20 MG: 4 TABLET ORAL at 09:23

## 2021-01-01 RX ADMIN — LEVOFLOXACIN 500 MG: 250 TABLET, FILM COATED ORAL at 15:07

## 2021-01-01 RX ADMIN — ALLOPURINOL 300 MG: 300 TABLET ORAL at 09:22

## 2021-01-01 RX ADMIN — CALCIUM ACETATE 2001 MG: 667 CAPSULE ORAL at 18:34

## 2021-01-01 RX ADMIN — LAMIVUDINE 100 MG: 100 TABLET, FILM COATED ORAL at 09:35

## 2021-01-01 RX ADMIN — LACTULOSE 20 G: 20 SOLUTION ORAL at 09:21

## 2021-01-01 RX ADMIN — VELPATASVIR AND SOFOSBUVIR 1 TABLET: 100; 400 TABLET, FILM COATED ORAL at 09:42

## 2021-01-01 RX ADMIN — ESCITALOPRAM OXALATE 10 MG: 10 TABLET ORAL at 10:59

## 2021-01-01 RX ADMIN — CYANOCOBALAMIN TAB 500 MCG 1000 MCG: 500 TAB at 09:22

## 2021-01-01 RX ADMIN — CYANOCOBALAMIN 1000 MCG: 1000 INJECTION, SOLUTION INTRAMUSCULAR at 09:24

## 2021-01-01 RX ADMIN — METHYLPHENIDATE HYDROCHLORIDE 5 MG: 5 TABLET ORAL at 09:34

## 2021-01-01 RX ADMIN — CALCIUM ACETATE 2001 MG: 667 CAPSULE ORAL at 12:00

## 2021-01-01 RX ADMIN — MICAFUNGIN SODIUM 50 MG: 50 INJECTION, POWDER, LYOPHILIZED, FOR SOLUTION INTRAVENOUS at 09:10

## 2021-01-01 RX ADMIN — ACYCLOVIR 400 MG: 400 TABLET ORAL at 20:38

## 2021-01-01 RX ADMIN — LEVOFLOXACIN 250 MG: 250 TABLET, FILM COATED ORAL at 12:38

## 2021-01-01 RX ADMIN — DEXAMETHASONE 20 MG: 4 TABLET ORAL at 19:54

## 2021-01-01 RX ADMIN — OMEPRAZOLE 20 MG: 20 CAPSULE, DELAYED RELEASE ORAL at 12:20

## 2021-01-01 RX ADMIN — PANTOPRAZOLE SODIUM 40 MG: 40 TABLET, DELAYED RELEASE ORAL at 19:48

## 2021-01-01 RX ADMIN — ACYCLOVIR 400 MG: 400 TABLET ORAL at 10:54

## 2021-01-01 RX ADMIN — ALLOPURINOL 300 MG: 300 TABLET ORAL at 10:59

## 2021-01-01 RX ADMIN — Medication 5 ML: at 09:18

## 2021-01-01 RX ADMIN — Medication 10 ML: at 17:59

## 2021-01-01 RX ADMIN — DEXAMETHASONE 20 MG: 4 TABLET ORAL at 08:56

## 2021-01-01 RX ADMIN — Medication 5 ML: at 10:24

## 2021-01-01 RX ADMIN — ACYCLOVIR 400 MG: 400 TABLET ORAL at 21:25

## 2021-01-01 RX ADMIN — CALCIUM ACETATE 1334 MG: 667 CAPSULE ORAL at 12:20

## 2021-01-01 RX ADMIN — PREDNISONE 50 MG: 50 TABLET ORAL at 18:37

## 2021-01-01 RX ADMIN — Medication 10 MG: at 09:43

## 2021-01-01 RX ADMIN — CALCIUM ACETATE 2001 MG: 667 CAPSULE ORAL at 18:00

## 2021-01-01 RX ADMIN — SODIUM CHLORIDE, POTASSIUM CHLORIDE, SODIUM LACTATE AND CALCIUM CHLORIDE 500 ML: 600; 310; 30; 20 INJECTION, SOLUTION INTRAVENOUS at 01:55

## 2021-01-01 RX ADMIN — PREDNISONE 50 MG: 50 TABLET ORAL at 12:47

## 2021-01-01 RX ADMIN — SODIUM CHLORIDE, POTASSIUM CHLORIDE, SODIUM LACTATE AND CALCIUM CHLORIDE 500 ML: 600; 310; 30; 20 INJECTION, SOLUTION INTRAVENOUS at 10:13

## 2021-01-01 RX ADMIN — MORPHINE SULFATE 2 MG: 2 INJECTION, SOLUTION INTRAMUSCULAR; INTRAVENOUS at 08:43

## 2021-01-01 RX ADMIN — Medication 5 ML: at 19:38

## 2021-01-01 RX ADMIN — AMLODIPINE BESYLATE 5 MG: 5 TABLET ORAL at 09:35

## 2021-01-01 RX ADMIN — Medication 5 ML: at 19:44

## 2021-01-01 RX ADMIN — MORPHINE SULFATE 1 MG: 2 INJECTION, SOLUTION INTRAMUSCULAR; INTRAVENOUS at 22:31

## 2021-01-01 RX ADMIN — MICAFUNGIN SODIUM 50 MG: 50 INJECTION, POWDER, LYOPHILIZED, FOR SOLUTION INTRAVENOUS at 09:47

## 2021-01-01 RX ADMIN — TRAZODONE HYDROCHLORIDE 50 MG: 50 TABLET ORAL at 20:57

## 2021-01-01 RX ADMIN — SODIUM CHLORIDE, POTASSIUM CHLORIDE, SODIUM LACTATE AND CALCIUM CHLORIDE 500 ML: 600; 310; 30; 20 INJECTION, SOLUTION INTRAVENOUS at 15:59

## 2021-01-01 RX ADMIN — AMLODIPINE BESYLATE 5 MG: 5 TABLET ORAL at 09:24

## 2021-01-01 RX ADMIN — AMLODIPINE BESYLATE 5 MG: 5 TABLET ORAL at 10:26

## 2021-01-01 RX ADMIN — MIDAZOLAM 1 MG: 1 INJECTION INTRAMUSCULAR; INTRAVENOUS at 08:23

## 2021-01-01 RX ADMIN — LACTULOSE 20 G: 20 SOLUTION ORAL at 12:44

## 2021-01-01 RX ADMIN — CYANOCOBALAMIN TAB 500 MCG 1000 MCG: 500 TAB at 09:12

## 2021-01-01 RX ADMIN — AMLODIPINE BESYLATE 5 MG: 5 TABLET ORAL at 08:47

## 2021-01-01 RX ADMIN — PREDNISONE 50 MG: 50 TABLET ORAL at 09:36

## 2021-01-01 RX ADMIN — ALLOPURINOL 300 MG: 300 TABLET ORAL at 08:47

## 2021-01-01 RX ADMIN — ACYCLOVIR 400 MG: 400 TABLET ORAL at 12:39

## 2021-01-01 RX ADMIN — OMEPRAZOLE 20 MG: 20 CAPSULE, DELAYED RELEASE ORAL at 08:56

## 2021-01-01 RX ADMIN — LORAZEPAM 0.5 MG: 2 INJECTION INTRAMUSCULAR; INTRAVENOUS at 08:53

## 2021-01-01 RX ADMIN — CALCIUM ACETATE 667 MG: 667 CAPSULE ORAL at 16:30

## 2021-01-01 RX ADMIN — Medication 10 MG: at 13:14

## 2021-01-01 RX ADMIN — LEVOFLOXACIN 750 MG: 750 TABLET, FILM COATED ORAL at 09:22

## 2021-01-01 RX ADMIN — ONDANSETRON HYDROCHLORIDE 16 MG: 8 TABLET, FILM COATED ORAL at 21:25

## 2021-01-01 RX ADMIN — SULFAMETHOXAZOLE AND TRIMETHOPRIM 1 TABLET: 400; 80 TABLET ORAL at 08:47

## 2021-01-01 RX ADMIN — LEVOFLOXACIN 750 MG: 750 TABLET, FILM COATED ORAL at 09:24

## 2021-01-01 RX ADMIN — MORPHINE SULFATE 1 MG: 2 INJECTION, SOLUTION INTRAMUSCULAR; INTRAVENOUS at 05:25

## 2021-01-01 RX ADMIN — ACYCLOVIR 400 MG: 400 TABLET ORAL at 19:48

## 2021-01-01 RX ADMIN — ONDANSETRON 8 MG: 2 INJECTION INTRAMUSCULAR; INTRAVENOUS at 09:56

## 2021-01-01 RX ADMIN — ALLOPURINOL 300 MG: 300 TABLET ORAL at 10:26

## 2021-01-01 RX ADMIN — DEXAMETHASONE 20 MG: 4 TABLET ORAL at 10:39

## 2021-01-01 RX ADMIN — MORPHINE SULFATE 1 MG: 2 INJECTION, SOLUTION INTRAMUSCULAR; INTRAVENOUS at 03:00

## 2021-01-01 RX ADMIN — CALCIUM ACETATE 1334 MG: 667 CAPSULE ORAL at 13:14

## 2021-01-01 RX ADMIN — PREDNISONE 50 MG: 50 TABLET ORAL at 09:42

## 2021-01-01 RX ADMIN — CALCIUM ACETATE 1334 MG: 667 CAPSULE ORAL at 17:52

## 2021-01-01 RX ADMIN — ACYCLOVIR 400 MG: 400 TABLET ORAL at 10:26

## 2021-01-01 RX ADMIN — SODIUM CHLORIDE 500 ML: 9 INJECTION, SOLUTION INTRAVENOUS at 23:30

## 2021-01-01 RX ADMIN — MICAFUNGIN SODIUM 50 MG: 50 INJECTION, POWDER, LYOPHILIZED, FOR SOLUTION INTRAVENOUS at 09:40

## 2021-01-01 RX ADMIN — PREDNISONE 50 MG: 50 TABLET ORAL at 19:59

## 2021-01-01 RX ADMIN — CALCIUM ACETATE 1334 MG: 667 CAPSULE ORAL at 09:36

## 2021-01-01 RX ADMIN — LAMIVUDINE 100 MG: 100 TABLET, FILM COATED ORAL at 12:46

## 2021-01-01 RX ADMIN — ACETAMINOPHEN 650 MG: 325 TABLET, FILM COATED ORAL at 14:13

## 2021-01-01 RX ADMIN — CALCIUM ACETATE 2001 MG: 667 CAPSULE ORAL at 10:24

## 2021-01-01 RX ADMIN — DEXAMETHASONE 20 MG: 4 TABLET ORAL at 20:38

## 2021-01-01 RX ADMIN — SALINE NASAL SPRAY 1 SPRAY: 1.5 SOLUTION NASAL at 11:40

## 2021-01-01 RX ADMIN — CYANOCOBALAMIN TAB 500 MCG 1000 MCG: 500 TAB at 09:43

## 2021-01-01 RX ADMIN — LEVOFLOXACIN 250 MG: 250 TABLET, FILM COATED ORAL at 08:47

## 2021-01-01 RX ADMIN — ACYCLOVIR 400 MG: 400 TABLET ORAL at 10:39

## 2021-01-01 RX ADMIN — Medication 5 ML: at 06:28

## 2021-01-01 RX ADMIN — PREDNISONE 50 MG: 50 TABLET ORAL at 20:01

## 2021-01-01 RX ADMIN — LAMIVUDINE 100 MG: 100 TABLET, FILM COATED ORAL at 14:41

## 2021-01-01 RX ADMIN — VELPATASVIR AND SOFOSBUVIR 1 TABLET: 100; 400 TABLET, FILM COATED ORAL at 09:28

## 2021-01-01 RX ADMIN — AMLODIPINE BESYLATE 5 MG: 5 TABLET ORAL at 08:56

## 2021-01-01 RX ADMIN — CYANOCOBALAMIN TAB 500 MCG 1000 MCG: 500 TAB at 09:35

## 2021-01-01 RX ADMIN — AMLODIPINE BESYLATE 5 MG: 5 TABLET ORAL at 09:36

## 2021-01-01 RX ADMIN — Medication 5 ML: at 17:30

## 2021-01-01 RX ADMIN — PENTAMIDINE ISETHIONATE 300 MG: 300 INHALANT RESPIRATORY (INHALATION) at 09:52

## 2021-01-01 RX ADMIN — ACYCLOVIR 400 MG: 400 TABLET ORAL at 19:59

## 2021-01-01 RX ADMIN — MICAFUNGIN SODIUM 50 MG: 50 INJECTION, POWDER, LYOPHILIZED, FOR SOLUTION INTRAVENOUS at 09:44

## 2021-01-01 RX ADMIN — Medication 5 ML: at 06:09

## 2021-01-01 RX ADMIN — LAMIVUDINE 100 MG: 100 TABLET, FILM COATED ORAL at 10:54

## 2021-01-01 RX ADMIN — PANTOPRAZOLE SODIUM 40 MG: 40 TABLET, DELAYED RELEASE ORAL at 21:25

## 2021-01-01 RX ADMIN — Medication 10 ML: at 17:41

## 2021-01-01 RX ADMIN — PREDNISONE 50 MG: 50 TABLET ORAL at 10:25

## 2021-01-01 RX ADMIN — METHOTREXATE: 25 INJECTION, SOLUTION INTRA-ARTERIAL; INTRAMUSCULAR; INTRATHECAL; INTRAVENOUS at 12:02

## 2021-01-01 RX ADMIN — ALBUTEROL SULFATE 2.5 MG: 2.5 SOLUTION RESPIRATORY (INHALATION) at 09:52

## 2021-01-01 RX ADMIN — Medication 5 ML: at 06:21

## 2021-01-01 RX ADMIN — VELPATASVIR AND SOFOSBUVIR 1 TABLET: 100; 400 TABLET, FILM COATED ORAL at 10:25

## 2021-01-01 RX ADMIN — Medication 10 MG: at 09:35

## 2021-01-01 RX ADMIN — TOPICAL ANESTHETIC 1 SPRAY: 200 SPRAY DENTAL; PERIODONTAL at 08:07

## 2021-01-01 RX ADMIN — LEVOFLOXACIN 750 MG: 750 TABLET, FILM COATED ORAL at 09:36

## 2021-01-01 RX ADMIN — MICAFUNGIN SODIUM 50 MG: 50 INJECTION, POWDER, LYOPHILIZED, FOR SOLUTION INTRAVENOUS at 09:43

## 2021-01-01 RX ADMIN — ALLOPURINOL 300 MG: 300 TABLET ORAL at 09:36

## 2021-01-01 RX ADMIN — VELPATASVIR AND SOFOSBUVIR 1 TABLET: 100; 400 TABLET, FILM COATED ORAL at 10:54

## 2021-01-01 RX ADMIN — DAUNORUBICIN HYDROCHLORIDE 37 MG: 5 INJECTION, SOLUTION INTRAVENOUS at 21:28

## 2021-01-01 RX ADMIN — ACETAMINOPHEN 650 MG: 325 TABLET, FILM COATED ORAL at 03:38

## 2021-01-01 RX ADMIN — FENTANYL CITRATE 100 MCG: 50 INJECTION, SOLUTION INTRAMUSCULAR; INTRAVENOUS at 08:08

## 2021-01-01 RX ADMIN — LORAZEPAM 0.5 MG: 2 INJECTION INTRAMUSCULAR; INTRAVENOUS at 17:04

## 2021-01-01 RX ADMIN — ACYCLOVIR 400 MG: 400 TABLET ORAL at 19:54

## 2021-01-01 RX ADMIN — Medication 5 ML: at 12:19

## 2021-01-01 RX ADMIN — LAMIVUDINE 100 MG: 100 TABLET, FILM COATED ORAL at 10:26

## 2021-01-01 RX ADMIN — Medication 5 ML: at 15:07

## 2021-01-01 RX ADMIN — ACYCLOVIR 400 MG: 400 TABLET ORAL at 09:36

## 2021-01-01 RX ADMIN — LEVOFLOXACIN 750 MG: 750 TABLET, FILM COATED ORAL at 09:44

## 2021-01-01 RX ADMIN — Medication 5 ML: at 09:57

## 2021-01-01 RX ADMIN — TRAZODONE HYDROCHLORIDE 50 MG: 50 TABLET ORAL at 20:38

## 2021-01-01 RX ADMIN — PROCHLORPERAZINE EDISYLATE 5 MG: 5 INJECTION INTRAMUSCULAR; INTRAVENOUS at 19:56

## 2021-01-01 RX ADMIN — Medication 5 ML: at 06:23

## 2021-01-01 RX ADMIN — LACTULOSE 20 G: 20 SOLUTION ORAL at 10:27

## 2021-01-01 RX ADMIN — MICAFUNGIN SODIUM 50 MG: 50 INJECTION, POWDER, LYOPHILIZED, FOR SOLUTION INTRAVENOUS at 09:36

## 2021-01-01 RX ADMIN — VELPATASVIR AND SOFOSBUVIR 1 TABLET: 100; 400 TABLET, FILM COATED ORAL at 12:41

## 2021-01-01 RX ADMIN — CALCIUM ACETATE 2001 MG: 667 CAPSULE ORAL at 12:42

## 2021-01-01 RX ADMIN — VELPATASVIR AND SOFOSBUVIR 1 TABLET: 100; 400 TABLET, FILM COATED ORAL at 09:26

## 2021-01-01 RX ADMIN — CALCIUM ACETATE 1334 MG: 667 CAPSULE ORAL at 17:39

## 2021-01-01 RX ADMIN — Medication 5 ML: at 18:33

## 2021-01-01 RX ADMIN — Medication 5 ML: at 13:30

## 2021-01-01 RX ADMIN — ACYCLOVIR 400 MG: 400 TABLET ORAL at 08:56

## 2021-01-01 RX ADMIN — LACTULOSE 20 G: 20 SOLUTION ORAL at 15:57

## 2021-01-01 RX ADMIN — SODIUM CHLORIDE 500 ML: 9 INJECTION, SOLUTION INTRAVENOUS at 10:04

## 2021-01-01 RX ADMIN — LORAZEPAM 1 MG: 2 INJECTION INTRAMUSCULAR; INTRAVENOUS at 00:17

## 2021-01-01 RX ADMIN — ALLOPURINOL 300 MG: 300 TABLET ORAL at 09:46

## 2021-01-01 RX ADMIN — LAMIVUDINE 100 MG: 100 TABLET, FILM COATED ORAL at 09:21

## 2021-01-01 RX ADMIN — OMEPRAZOLE 20 MG: 20 CAPSULE, DELAYED RELEASE ORAL at 12:44

## 2021-01-01 RX ADMIN — CYANOCOBALAMIN TAB 500 MCG 1000 MCG: 500 TAB at 10:27

## 2021-01-01 RX ADMIN — Medication 5 ML: at 09:48

## 2021-01-01 ASSESSMENT — ACTIVITIES OF DAILY LIVING (ADL)
ADLS_ACUITY_SCORE: 16
ADLS_ACUITY_SCORE: 20
ADLS_ACUITY_SCORE: 19
ADLS_ACUITY_SCORE: 18
ADLS_ACUITY_SCORE: 18
ADLS_ACUITY_SCORE: 17
ADLS_ACUITY_SCORE: 19
ADLS_ACUITY_SCORE: 17
ADLS_ACUITY_SCORE: 18
ADLS_ACUITY_SCORE: 18
ADLS_ACUITY_SCORE: 19
ADLS_ACUITY_SCORE: 17
ADLS_ACUITY_SCORE: 18
ADLS_ACUITY_SCORE: 17
ADLS_ACUITY_SCORE: 18
ADLS_ACUITY_SCORE: 17
ADLS_ACUITY_SCORE: 19
ADLS_ACUITY_SCORE: 17
ADLS_ACUITY_SCORE: 17
ADLS_ACUITY_SCORE: 16
ADLS_ACUITY_SCORE: 18
DEPENDENT_IADLS:: CLEANING;COOKING;LAUNDRY;SHOPPING;TRANSPORTATION;MEAL PREPARATION
ADLS_ACUITY_SCORE: 20
ADLS_ACUITY_SCORE: 17
ADLS_ACUITY_SCORE: 17
ADLS_ACUITY_SCORE: 23
ADLS_ACUITY_SCORE: 18
ADLS_ACUITY_SCORE: 16
ADLS_ACUITY_SCORE: 19
ADLS_ACUITY_SCORE: 18
ADLS_ACUITY_SCORE: 19
ADLS_ACUITY_SCORE: 18
ADLS_ACUITY_SCORE: 19
ADLS_ACUITY_SCORE: 17
ADLS_ACUITY_SCORE: 18
ADLS_ACUITY_SCORE: 17
ADLS_ACUITY_SCORE: 17
ADLS_ACUITY_SCORE: 18
ADLS_ACUITY_SCORE: 17
ADLS_ACUITY_SCORE: 20
ADLS_ACUITY_SCORE: 19
ADLS_ACUITY_SCORE: 17
ADLS_ACUITY_SCORE: 18
PREVIOUS_RESPONSIBILITIES: MEAL PREP;HOUSEKEEPING;SHOPPING;MEDICATION MANAGEMENT;DRIVING
ADLS_ACUITY_SCORE: 18
ADLS_ACUITY_SCORE: 19
ADLS_ACUITY_SCORE: 20
ADLS_ACUITY_SCORE: 16
ADLS_ACUITY_SCORE: 17
ADLS_ACUITY_SCORE: 20
ADLS_ACUITY_SCORE: 23
ADLS_ACUITY_SCORE: 19
ADLS_ACUITY_SCORE: 23
ADLS_ACUITY_SCORE: 17
ADLS_ACUITY_SCORE: 19
ADLS_ACUITY_SCORE: 19
ADLS_ACUITY_SCORE: 18
ADLS_ACUITY_SCORE: 17
ADLS_ACUITY_SCORE: 16
ADLS_ACUITY_SCORE: 18
ADLS_ACUITY_SCORE: 17
ADLS_ACUITY_SCORE: 18
ADLS_ACUITY_SCORE: 18
ADLS_ACUITY_SCORE: 19
ADLS_ACUITY_SCORE: 16

## 2021-01-01 ASSESSMENT — MIFFLIN-ST. JEOR
SCORE: 1116.85
SCORE: 1136.81
SCORE: 1078.29
SCORE: 1145.43
SCORE: 1122.29

## 2021-05-07 NOTE — TELEPHONE ENCOUNTER
RECORDS RECEIVED FROM: [facility]   Appt Date: 05.07.2021   NOTES STATUS DETAILS   OFFICE NOTE from referring provider Care Everywhere 04.29.2021 Armando Noland PA-C   OFFICE NOTES from other specialists N/A    DISCHARGE SUMMARY from hospital N/A    MEDICATION LIST N/A    LIVER BIOSPY (IF APPLICABLE)      PATHOLOGY REPORTS  N/A    IMAGING     ENDOSCOPY (IF AVAILABLE) N/A    COLONOSCOPY (IF AVAILABLE) N/A    ULTRASOUND LIVER N/A    CT OF ABDOMEN N/A    MRI OF LIVER N/A    FIBROSCAN, US ELASTOGRAPHY, FIBROSIS SCAN, MR ELASTOGRAPHY N/A    LABS     HEPATIC PANEL (LIVER PANEL) Care Everywhere 05.04.2021   BASIC METABOLIC PANEL Care Everywhere 04.27.2021   COMPLETE METABOLIC PANEL Care Everywhere 04.27.2021   COMPLETE BLOOD COUNT (CBC) Care Everywhere 04.27.2021   INTERNATIONAL NORMALIZED RATIO (INR) N/A    HEPATITIS C ANTIBODY Care Everywhere 05.04.2021   HEPATITIS C VIRAL LOAD/PCR N/A    HEPATITIS C GENOTYPE N/A    HEPATITIS B SURFACE ANTIGEN Care Everywhere 05.04.2021   HEPATITIS B SURFACE ANTIBODY Care Everywhere 05.04.2021   HEPATITIS B DNA QUANT LEVEL N/A    HEPATITIS B CORE ANTIBODY N/A

## 2021-05-19 NOTE — PROGRESS NOTES
Hepatology CLINIC VISIT    CC/REFERRING MD:  Armando Noland  REASON FOR CONSULTATION: Hepatitis C    ASSESSMENT/PLAN:  Heather Aden is a 75 y/o F with PMHx of HTN who presents with hepatitis C.    #. Hepatitis C, genotype 1a  #. Hepatocellular liver injury, mild  Unknown duration of hepatitis C, per patient known since may 2021. Unclear risk behaviors. Patient with AST and ALT 2-3x ULN, otherwise normal cholestatic enzymes. Concern for portal HTN given platelets < 150. Albumin 3.5, suggestive of potential synthetic dysfunction vs. malnutrition. Patient without signs or symptoms of decompensated cirrhosis, such as confusion, ascites, melena, hematochezia or hematemesis.  -- Plan for MELD labs: CBC, CMP and INR along w/Hep B c Ab and Hep A IgG  -- Plan for US elastography + abdomen complete to evaluate for splenomegaly, cirrhosis and HCC  -- Based on the above, patient would likely benefit from Mayvret x 8 weeks; will contact the patient after these labs and imaging are done to schedule follow up to initiate HCV therapy    Health Maintenance:  - Colon cancer screening: None previously, not interested in colonoscopy in the future    Return to clinic: 6 months    Discussed with hepatology attending, Dr. Leventhal who agrees with the above assessment and plan.    Luna Piña MD  Gastroenterology and Hepatology Fellow  Pager: [phone]      HPI:   Heather Aden is a 75 y/o F with PMHx of HTN who presents with hepatitis C.    - Currently having issues with: extreme fatigue, requiring frequent naps, as well as chills and body aches. Her temperature at home has been in the high 99s, but never > 100.  - She has been having falls (4-5) in the labs month. She feels as though she has lost her equilibrium sometimes. Denies chest pain, shortness of breath or palpitations.  - Denies abdominal distension, melena, hematochezia or hematemesis  - Some mild left leg swelling following her skin cancer removal  - Denies any  significant confusion  - 1 BM per day (to every other day). Denies abdominal pain, nausea or emesis.   - Denies shaking of her hands with her hands outstretched    PERTINENT PAST MEDICAL HISTORY:  - HTN  - Skin cancer    PREVIOUS SURGERIES:  -  x3   - Cholecystectomy ()  - Tonsillectomy  - Skin cancer s/p resection left leg    ROS:  10 point ROS was conducted. Pertinent positives and negatives as above.    ALLERGIES:  NKDA    PERTINENT MEDICATIONS:  - Amlodipine 5mg qdaily  - Prednisone intermittently  - Hydrocodone PRN  - Tylenol PRN  - Ibuprofen 1 tablet 2-3x/week     SOCIAL HISTORY:  -   - Lives w/  - Alcohol: Denies current use, minimal use for 30+ years  - Tobacco: Denies  - Marijuana: Previous use, not currently  - Denies IVDU or intra-nasal drug use  - Denies previous [facility] service    FAMILY HISTORY:  - Adopted    PHYSICAL EXAMINATION:  Telephone encounter, thus no vital signs or physical exam performed    PERTINENT STUDIES:  2021:  ALT 52        Alk Phos 80        AST 57 (H)        Total protein 6.5        Albumin 3.5        Direct bili 0.73 (H)        Total bili 2.8 (H)     Hep B surface Ab: Reactive, > 10  Hep B surface Ag: Non-reactive  Hep B core IgM Ab: Non-reactive  HIV 1/2 Ag/Ab Screen: Non-reactive    2021:  Na: 141  SCr: 0.63  HCV RNA: 349,000  Hepatitis C Ab: Reactive  Peripheral Smear: Neutropenia. Lymphopenia. Thrombocytopenia.  WBC: 2.6, Hgb: 13, platelets 77    ENDOSCOPY:  None prior    IMAGING/STUDIES:   Abdominal US :  FINDINGS: Liver is normal size and echo texture. There are  multiple gallstones. There is mild irregularity in the thickness  of the gallbladder wall, which can be seen with adenomyomatosis.  No pericholecystic fluid. No biliary ductal dilatation, and the  common bile duct measures approximately 4 mm in caliber.  Pancreatic body appears normal. Pancreatic head and tail are  poorly visualized due to echogenic shadowing by bowel gas.  Normal  direction of flow in the portal vein. Kidneys, spleen and  visualized abdominal aorta are within normal limits.    Heather is a 76 year old who is being evaluated via a billable telephone visit.      What phone number would you like to be contacted at? [phone]  How would you like to obtain your AVS? Mail a copy    Phone call duration: 17 minutes

## 2021-05-20 NOTE — LETTER
5/20/2021         RE: Heather Aden  [address]  [place] [address]        Dear Colleague,    Thank you for referring your patient, Heather Aden, to the [facility] [place]. Please see a copy of my visit note below.    Hepatology CLINIC VISIT    CC/REFERRING MD:  Armando Noland  REASON FOR CONSULTATION: Hepatitis C    ASSESSMENT/PLAN:  Heather Aden is a 77 y/o F with PMHx of HTN who presents with hepatitis C.    #. Hepatitis C, genotype 1a  #. Hepatocellular liver injury, mild  Unknown duration of hepatitis C, per patient known since may 2021. Unclear risk behaviors. Patient with AST and ALT 2-3x ULN, otherwise normal cholestatic enzymes. Concern for portal HTN given platelets < 150. Albumin 3.5, suggestive of potential synthetic dysfunction vs. malnutrition. Patient without signs or symptoms of decompensated cirrhosis, such as confusion, ascites, melena, hematochezia or hematemesis.  -- Plan for MELD labs: CBC, CMP and INR along w/Hep B c Ab and Hep A IgG  -- Plan for US elastography + abdomen complete to evaluate for splenomegaly, cirrhosis and HCC  -- Based on the above, patient would likely benefit from Mayvret x 8 weeks; will contact the patient after these labs and imaging are done to schedule follow up to initiate HCV therapy    Health Maintenance:  - Colon cancer screening: None previously, not interested in colonoscopy in the future    Return to clinic: 6 months    Discussed with hepatology attending, Dr. Leventhal who agrees with the above assessment and plan.    Luna Piña MD  Gastroenterology and Hepatology Fellow  Pager: [phone]      HPI:   Heather Aden is a 77 y/o F with PMHx of HTN who presents with hepatitis C.    - Currently having issues with: extreme fatigue, requiring frequent naps, as well as chills and body aches. Her temperature at home has been in the high 99s, but never > 100.  - She has been having falls (4-5) in the labs  month. She feels as though she has lost her equilibrium sometimes. Denies chest pain, shortness of breath or palpitations.  - Denies abdominal distension, melena, hematochezia or hematemesis  - Some mild left leg swelling following her skin cancer removal  - Denies any significant confusion  - 1 BM per day (to every other day). Denies abdominal pain, nausea or emesis.   - Denies shaking of her hands with her hands outstretched    PERTINENT PAST MEDICAL HISTORY:  - HTN  - Skin cancer    PREVIOUS SURGERIES:  -  x3   - Cholecystectomy ()  - Tonsillectomy  - Skin cancer s/p resection left leg    ROS:  10 point ROS was conducted. Pertinent positives and negatives as above.    ALLERGIES:  NKDA    PERTINENT MEDICATIONS:  - Amlodipine 5mg qdaily  - Prednisone intermittently  - Hydrocodone PRN  - Tylenol PRN  - Ibuprofen 1 tablet 2-3x/week     SOCIAL HISTORY:  -   - Lives w/  - Alcohol: Denies current use, minimal use for 30+ years  - Tobacco: Denies  - Marijuana: Previous use, not currently  - Denies IVDU or intra-nasal drug use  - Denies previous [facility] service    FAMILY HISTORY:  - Adopted    PHYSICAL EXAMINATION:  Telephone encounter, thus no vital signs or physical exam performed    PERTINENT STUDIES:  2021:  ALT 52        Alk Phos 80        AST 57 (H)        Total protein 6.5        Albumin 3.5        Direct bili 0.73 (H)        Total bili 2.8 (H)     Hep B surface Ab: Reactive, > 10  Hep B surface Ag: Non-reactive  Hep B core IgM Ab: Non-reactive  HIV 1/2 Ag/Ab Screen: Non-reactive    2021:  Na: 141  SCr: 0.63  HCV RNA: 349,000  Hepatitis C Ab: Reactive  Peripheral Smear: Neutropenia. Lymphopenia. Thrombocytopenia.  WBC: 2.6, Hgb: 13, platelets 77    ENDOSCOPY:  None prior    IMAGING/STUDIES:   Abdominal US :  FINDINGS: Liver is normal size and echo texture. There are  multiple gallstones. There is mild irregularity in the thickness  of the gallbladder wall, which can be seen with  adenomyomatosis.  No pericholecystic fluid. No biliary ductal dilatation, and the  common bile duct measures approximately 4 mm in caliber.  Pancreatic body appears normal. Pancreatic head and tail are  poorly visualized due to echogenic shadowing by bowel gas. Normal  direction of flow in the portal vein. Kidneys, spleen and  visualized abdominal aorta are within normal limits.    Heather is a 76 year old who is being evaluated via a billable telephone visit.      What phone number would you like to be contacted at? [phone]  How would you like to obtain your AVS? Mail a copy    Phone call duration: 17 minutes    Attestation signed by Leventhal, Thomas Michael, MD at 5/20/2021 10:32 AM:  Staff Physician Attestation  I, Thomas M. Leventhal, M.D., discussed and examined this patient with the fellow and agree with the fellow s findings and plan of care as documented in the fellow s note.  I personally reviewed vital signs, medications, labs, and imaging.    This is a 76-year-old female with a recent diagnosis of chronic hepatitis C, genotype 1a.  Very unclear as to how this patient got this chronic infection based on her history.  There is some concern that she may have portal hypertension given low platelets.  No other overt signs of portal hypertension now.    Plan:  - MELD labs now  -We will complete hepatitis B serologies prior to initiation of hepatitis C therapy   - we will obtain an abdominal ultrasound with elastography to risk stratify for fibrosis  -We will order hepatitis C medications after all studies complete      Thomas M. Leventhal, M.D.   of Medicine  Advanced & Transplant Hepatology  [facility]  Date of Service: 05/20/21      Heather is a 76 year old who is being evaluated via a billable telephone visit.      What phone number would you like to be contacted at? [phone]  How would you like to obtain your AVS? [employer]  Phone call duration: 23  minutes

## 2021-05-20 NOTE — PATIENT INSTRUCTIONS
- Plan labs in the next week  - Plan for ultrasound of your liver to better evaluate your liver and liver stiffness  - Based on labs and imaging, we will determine hepatitis C therapy for you

## 2021-05-20 NOTE — PROGRESS NOTES
Heather is a 76 year old who is being evaluated via a billable telephone visit.      What phone number would you like to be contacted at? [phone]  How would you like to obtain your AVS? [employer]  Phone call duration: 23 minutes

## 2021-06-12 NOTE — TELEPHONE ENCOUNTER
Called patient regarding lab results - as below.  * BMP wnl  * Patient with leukopenia, anemia and thrombocytopenia. Has been leukopenic and thrombocytopenic prior, but anemia is new. No melena, hematochezia or hematemesis. Plan for retic, smear and iron studies; ordered. No prior colonoscopy.   * INR slightly elevated and albumin low, suggests some element of synthetic dysfunction  * Abdominal US suggestive of advanced fibrosis.   * Hep B exposed w/immunity (hep B c Ab+, Hep B s Ab+, Hep S Ag negative)  * Hep A: Non-immune, would benefit from vacation per PCP; will message  * Given advanced fibrosis, plan for q6 month: US + AFP to evaluate for HCC; ordered  * Plan for HCV treatment, will connect with pharmacist.     BMP  Recent Labs   Lab Test 06/03/21  0957      POTASSIUM 3.5   CHLORIDE 105   CEASAR 8.9   CO2 23   BUN 11   CR 0.55   GLC 90      CBC  Recent Labs   Lab Test 06/03/21  0957   WBC 1.3*   RBC 3.05*   HGB 9.2*   HCT 27.3*   MCV 90   MCH 30.2   MCHC 33.7   RDW 18.7*   PLT 76*     Liver Function Studies   Recent Labs   Lab Test 06/03/21  0957   PROTTOTAL 6.7*   ALBUMIN 3.2*   BILITOTAL 1.9*   ALKPHOS 51   AST 55*   ALT 14      INR   INR   Date Value Ref Range Status   06/03/2021 1.16 (H) 0.86 - 1.14 Final      Ultrasound Elastography:  1. The median liver stiffness is 2.37 m/sec and the IQR/median value  is 0.15 . This is a high elastography values indicating advanced  chronic liver disease.  2. Heterogeneous liver echotexture, consistent with intrinsic  parenchymal liver disease. Focal liver lesions consistent with simple  cysts. No suspicious liver lesion.  3. Mild splenomegaly.  4. Focal right renal echogenicity, possible nonobstructive calculus.

## 2021-06-21 NOTE — TELEPHONE ENCOUNTER
"[facility] Call Center    Phone Message    May a detailed message be left on voicemail: yes     Reason for Call: Medication Question or concern regarding medication   Prescription Clarification  Name of Medication: Patient is waiting to start taking  Hep C therapy medication.   Per Dr. Piña notes \"HCV treatment and joshua  connect with pharmacist\"-    Patient is waiting on Rx to be sent to [facility] to start treatment.  Prescribing Provider: Dr. Leventhal   [facility]   What on the order needs clarification? HCV Treatment. Please contact patient to discuss status of Rx.     Action Taken: Message routed to:  [facility]  HEPTOLOGY    Travel Screening: Not Applicable                                                                        "

## 2021-06-22 NOTE — TELEPHONE ENCOUNTER
Lataet x 8 weeks ordered per Dr. Piña. Notified pt of prescription and hepatitis C treatment process. Pt aware that she will receive a call once insurance has approved prescription and delivery will be arranged.

## 2021-06-28 NOTE — TELEPHONE ENCOUNTER
PA Initiation    Medication: Epclusa  Insurance Company: [employer] [place] - Phone [phone] Fax [phone]  Pharmacy Filling the Rx: [facility] - [place], [place] - [facility] [address]  Filling Pharmacy Phone: [phone]  Filling Pharmacy Fax: [phone]  Start Date: 6/28/2021

## 2021-07-01 NOTE — TELEPHONE ENCOUNTER
Prior Authorization Approval    Authorization Effective Date: 6/30/2021  Authorization Expiration Date: 9/21/2021  Medication: Epclusa  Approved Dose/Quantity: 12 weeks  Reference #:     Insurance Company: [employer] [place] - Phone [phone] Fax [phone]  Expected CoPay: $0     CoPay Card Available: No    Foundation Assistance Needed: Yes ($2351.63 before [facility] arianne)  Which Pharmacy is filling the prescription (Not needed for infusion/clinic administered): [facility] - [place], [place] - [address]  Pharmacy Notified: Yes  Patient Notified: Yes

## 2021-07-02 NOTE — PROGRESS NOTES
Connected with patient for f/u on Hep C treatment delivery/start status. Patient received their Epclusa medication and are ready to start treatment. Patient will be on Hep C treatment for 12 weeks. Patient reports no recent changes in health, hospitalizations or recent changes in medications. Patient did discuss with a pharmacist. Reviewed the following Hep C POC and education with patient.     Hepatitis C Treatment  Treatment: Epclusa x 12 weeks  Genotype: 1a  Stage Fibrosis: F4  Previous Treatment Outcome: Naive    Please have labs drawn as close to the date indicated at an [facility].    Start Date: 07/03/21    Week 12 - End of Treatment  HCV RNA Quant Lab due: 9/25/2021  Please have this lab drawn on or within a week after this date 9/25/2021. You will need to repeat this lab 3 months after completing treatment to ensure you have cleared the virus. Please see date for the final lab below. You do not need to fast for this lab.       3 Months Post Treatment  HCV RNA Quant Lab due: 12/18/2021  Please have this lab drawn on the specified date of 12/18/2021 or within a week after. This final lab will determine if you have cleared the Hepatitis C virus with Epclusa treatment. Without this final lab, we will be unable to determine if treatment was successful. You do not need to fast for this lab.             Educational information to patient on Hep C treatment:     -Contact the [facility] Hepatology clinic and speak with clinical RN prior to starting any new prescribed or OTC medications.   -Take medications exactly as prescribed, do not change dose or stop taking without consulting your provider.   -Take Medication one time each day with or without food  -If you miss a dose of medication, then take it as soon as you remember on the same day. If not remembered on the same day, then skip the dose and take the next dose at the usual time. Do not take more than the recommended dose. Contact the clinic if you miss  a dose.    Please contact the pharmacy 1-2 weeks prior to needing a medication refill.      Side Effects  The most common side effects of Hep C medication treatment can include:  -tiredness  -headache  -nausea  Notify the clinic of any side effects that bother you or that do not go away.    Contact clinic for rash, itching or unmanageable nausea.     How to store Hep C Treatment Medications  -Store Medication at room temperature below 86 degrees F  -Keep Medication in it's original container  -Do not use Medication if the seal is broken or missing     General information  It is not known if treatment will prevent you from infecting another person or reinfecting yourself with the hepatitis C virus during treatment. It is best that as soon as you start treatment to buy a new toothbrush, disposable razors (if you use a rotating shaver you do not need to buy a new one) and nail clippers. If you wear dentures, you should soak your dentures in 70-90% Isopropyl solution for 5 minutes one time within the first week of starting treatment. After dentures are done soaking, rinse your dentures off thoroughly with water. If you check your blood sugar at home, please dispose of the fingerstick needle after each use and DO NOT REUSE the insulin needles. These items should not be shared with anyone.          If you have any questions, please contact the main clinic at [phone] or your clinical RN at [phone]. We appreciate you choosing the [facility] for your treatment. Patient agrees to Hep C treatment POC and verbalizes understanding. Patient will receive a copy of treatment plan in the mail, address verified with patient. Patient has no further questions or concerns. Hep C care team updated on patient status.    Heather Griffith RN Care Coordinator  [facility]  Hepatology Clinic/Specialty Program

## 2021-07-02 NOTE — LETTER
July 2, 2021       TO: Heather Aden  [address]   [place] [address]       Hepatitis C Treatment  Treatment: Epclusa x 12 weeks  Genotype: 1a  Stage Fibrosis: F4  Previous Treatment Outcome: Naive    Please have labs drawn as close to the date indicated at an [facility].    Start Date: 07/03/21    Week 12 - End of Treatment  HCV RNA Quant Lab due: 9/25/2021  Please have this lab drawn on or within a week after this date 9/25/2021. You will need to repeat this lab 3 months after completing treatment to ensure you have cleared the virus. Please see date for the final lab below. You do not need to fast for this lab.       3 Months Post Treatment  HCV RNA Quant Lab due: 12/18/2021  Please have this lab drawn on the specified date of 12/18/2021 or within a week after. This final lab will determine if you have cleared the Hepatitis C virus with Epclusa treatment. Without this final lab, we will be unable to determine if treatment was successful. You do not need to fast for this lab.             Educational information to patient on Hep C treatment:     -Contact the [facility] Hepatology clinic and speak with clinical RN prior to starting any new prescribed or OTC medications.   -Take medications exactly as prescribed, do not change dose or stop taking without consulting your provider.   -Take Medication one time each day with or without food  -If you miss a dose of medication, then take it as soon as you remember on the same day. If not remembered on the same day, then skip the dose and take the next dose at the usual time. Do not take more than the recommended dose. Contact the clinic if you miss a dose.    Please contact the pharmacy 1-2 weeks prior to needing a medication refill.      Side Effects  The most common side effects of Hep C medication treatment can include:  -tiredness  -headache  -nausea  Notify the clinic of any side effects that bother you or that do not go away.    Contact clinic  for rash, itching or unmanageable nausea.     How to store Hep C Treatment Medications  -Store Medication at room temperature below 86 degrees F  -Keep Medication in it's original container  -Do not use Medication if the seal is broken or missing     General information  It is not known if treatment will prevent you from infecting another person or reinfecting yourself with the hepatitis C virus during treatment. It is best that as soon as you start treatment to buy a new toothbrush, disposable razors (if you use a rotating shaver you do not need to buy a new one) and nail clippers. If you wear dentures, you should soak your dentures in 70-90% Isopropyl solution for 5 minutes one time within the first week of starting treatment. After dentures are done soaking, rinse your dentures off thoroughly with water. If you check your blood sugar at home, please dispose of the fingerstick needle after each use and DO NOT REUSE the insulin needles. These items should not be shared with anyone.          If you have any questions, please contact the main clinic at [phone] or your clinical RN at [phone]. We appreciate you choosing the [facility] clinic for your treatment.     Heather Griffith RN Care Coordinator  [facility] Hepatology Clinic/Specialty Program   [phone]

## 2021-07-02 NOTE — TELEPHONE ENCOUNTER
Health Call Center    Phone Message    May a detailed message be left on voicemail: yes     Reason for Call: Medication Question or concern regarding medication   Prescription Clarification  Name of Medication: sofosbuvir-velpatasvir (EPCLUSA) 400-100 MG per tablet [[id number]] (Order [id number])  Prescribing Provider: Leventhal, Thomas Michael, MD     What on the order needs clarification? Patient just received the medication and wanted to ask if she can start taking them.  Please reach out to patient.      Action Taken: Message routed to:  [facility] Hepatology    Travel Screening: Not Applicable

## 2021-07-17 PROBLEM — C91.00 ALL (ACUTE LYMPHOBLASTIC LEUKEMIA) (H): Status: ACTIVE | Noted: 2021-01-01

## 2021-07-17 NOTE — H&P
[facility]    History and Physical - Hospitalist Service, HEME malignancy        Date of Admission:  7/17/2021     Assessment & Plan      Heather Aden is a 76 year old female admitted on 7/17/2021 comes from [facility] as a transfer for Newly diagnosed ALL needing inpatient treatment.     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 Per hem/onc notes from outside facility was started on allopurinol and TLS labs were not concerning     Neutropenic/Heme malignancy ppx   Fungal:micafungin   Bacterial:levofloxacin   Viral herpes:c/w acyclovir   CMV:awaiting titers, Consider letermovir   Parasitic /PCP :started bactrim     Syncope   Aortic stenosis   Likely related to pancytopenia/anemia on labs, Had echo 7/15 that showed no remarkable change in known aortic stenosis, EKG/trops on 7/13 unremarkable, CTA chest on 7/13 ruled out PE but had a small effusion, Brain MRI 7/14 showed non-specific leptomeningeal enhancement that was not worked up further at outside facility, Orthostatics reported negative at OSH.     Reported fever of 100.5 at outside facility  7/17/2021  Could be related to cancer, No infection was found at OSH, since patient hemodynamically stable no antibiotics were started at outside facility, Would consider LP if fever occurs post transfer to assess for infections given leptomeningeal enhancement seen on MRI brain at OSH    Vitamin B12 Deficiency  Vitamin B12 low at 127, folate borderline low at 4 on 7/14.  Heme/Onc at [facility] recomended parenteral vit B12 1000 mcg x 3 for the first week, followed by weekly for one month, then switch to monthly 1000 mg  and Supplement folic acid 1 mg daily      Chronic issues   Back pain with disc disease: c/w pta prn oxycodone   Hepatitis C type 1a: was started on sofosbuvir /valpatasavir on 7/3/2021 for 12 week course   htn: c/w pta amlodipine   H/o HCC : No active issues   H/o motion sickness:not on meds per EMR can treat accordingly      Diet:   Regular diet   DVT Prophylaxis: SCD boots  Tomas Catheter: Not present  Central Lines: None  Code Status:      Risk Factors Present on Admission                   Disposition Plan   Expected discharge:  recommended to  TBD  once  Treatment plan for ALL decided on  .     The patient's care was discussed with the Bedside Nurse and Patient.    Cayden Quiroz MD  [facility]  Securely message with the Vocera Web Console (learn more here)  Text page via Goodreads Paging/Directory      ______________________________________________________________________    Chief Complaint   New ALL diagnosis     History is obtained from the patient    History of Present Illness   Heather Aden is a 76 year old female who pmh of IVDU, back pain related to disc disease, htn,hepatitis C, herpes simplex presented from [facility] for new diagnosis of ALL,      Patient initially presented to [facility] with syncope and dizziness, Found to be profoundly anemic with new pancytopenia and blasts on peripheral smear, Remainder of her syncope workup showed stable aortic stenosis and new leptomeningeal enhancement on the brain MRI that was not worked up at OSH given there was lack of symptoms per EMR records.     She eventually got a bone marrow biopsy on 7/15 that showed ALL consistent pathology and was requested to be transferred to the [facility] for further care.     Patient did spike a fever of 100.5 on 7/17/2021 per records, Blood cultures, urine and chest x ray was unremarkable. No antibiotics were given per records.     On arrival patient was complaining of  generalized malaise and lethargy, Her ppx medications were resumed and tylenol given for mild body aches.         Review of Systems    CONSTITUTIONAL:  positive for  sweats, fatigue and malaise  HEENT:  negative for  epistaxis, snoring and sore mouth  RESPIRATORY:  negative for  cough with sputum, dyspnea and wheezing  CARDIOVASCULAR:  negative for  palpitations, orthopnea, PND  GASTROINTESTINAL:  negative for abdominal pain, pruritus and abdominal mass  GENITOURINARY:  negative for dysuria, nocturia and urinary incontinence  HEMATOLOGIC/LYMPHATIC:  negative for bleeding, lymphadenopathy and petechiae  ALLERGIC/IMMUNOLOGIC:  negative for urticaria, hay fever and angioedema  ENDOCRINE:  negative for cold intolerance, tremor and weight changes  MUSCULOSKELETAL:  negative for  joint swelling, stiff joints and decreased range of motion  NEUROLOGICAL:  negative for tremor, dysphagia and weakness  BEHAVIOR/PSYCH:  negative for poor concentration, depressed mood and elated mood    Past Medical History    I have reviewed this patient's medical history and updated it with pertinent information if needed.   No past medical history on file.    Past Surgical History   I have reviewed this patient's surgical history and updated it with pertinent information if needed.  No past surgical history on file.    Social History   I have reviewed this patient's social history and updated it with pertinent information if needed.  Social History     Tobacco Use     Smoking status: Not on file   Substance Use Topics     Alcohol use: Not on file     Drug use: Not on file       Family History   Fathers side has htn       Prior to Admission Medications   Cannot display prior to admission medications because the patient has not been admitted in this contact.     Allergies   No Known Allergies    Physical Exam   Vital Signs:                    Weight: 0 lbs 0 oz    Constitutional: awake, alert, cooperative, no apparent distress, and appears  stated age  Eyes: Lids and lashes normal, pupils equal, round and reactive to light, extra ocular muscles intact, sclera clear, conjunctiva normal  ENT: Normocephalic, without obvious abnormality, atraumatic, sinuses nontender on palpation, external ears without lesions, oral pharynx with moist mucous membranes, tonsils without erythema or exudates, gums normal and good dentition.  Hematologic / Lymphatic: no cervical lymphadenopathy  Respiratory: No increased work of breathing, good air exchange, clear to auscultation bilaterally, no crackles or wheezing  Cardiovascular: Normal apical impulse, regular rate and rhythm, normal S1 and S2, no S3 or S4, and no murmur noted  GI: No scars, normal bowel sounds, soft, non-distended, non-tender, no masses palpated, no hepatosplenomegally  Genitounirinary:   Skin: no bruising or bleeding  Musculoskeletal: There is no redness, warmth, or swelling of the joints.  Full range of motion noted.  Motor strength is 5 out of 5 all extremities bilaterally.  Tone is normal.  Neurologic: Awake, alert, oriented to name, place and time.  Cranial nerves II-XII are grossly intact.  Motor is 5 out of 5 bilaterally.  Cerebellar finger to nose, heel to shin intact.  Sensory is intact.  Babinski down going, Romberg negative, and gait is normal.  Neuropsychiatric: General: normal, calm and normal eye contact    Data   Data reviewed today: I reviewed all medications, new labs and imaging results over the last 24 hours. I personally reviewed no images or EKG's today.    No lab results found in last 7 days.    Most Recent 3 CBC's:Recent Labs   Lab Test 06/03/21  0957   WBC 1.3*   HGB 9.2*   MCV 90   PLT 76*     Most Recent 3 BMP's:Recent Labs   Lab Test 06/03/21  0957      POTASSIUM 3.5   CHLORIDE 105   CO2 23   BUN 11   CR 0.55   ANIONGAP 11   CEASAR 8.9   GLC 90     Most Recent 2 LFT's:Recent Labs   Lab Test 06/03/21  0957   AST 55*   ALT 14   ALKPHOS 51   BILITOTAL 1.9*     No results found  for this or any previous visit (from the past 24 hour(s)).

## 2021-07-18 NOTE — PROCEDURES
[facility]    Double Lumen PICC Placement    Date/Time: 7/18/2021 10:24 AM  Performed by: Manasa Eckert RN  Authorized by: River Goodrich MD   Indications: vascular access    UNIVERSAL PROTOCOL   Site Marked: Yes  Prior Images Obtained and Reviewed:  Yes  Required items: Required blood products, implants, devices and special equipment available    Patient identity confirmed:  Verbally with patient and arm band  NA - No sedation, light sedation, or local anesthesia  Confirmation Checklist:  Patient's identity using two indicators, relevant allergies, procedure was appropriate and matched the consent or emergent situation and correct equipment/implants were available  Time out: Immediately prior to the procedure a time out was called    Universal Protocol: the Joint Commission Universal Protocol was followed    Preparation: Patient was prepped and draped in usual sterile fashion           ANESTHESIA    Anesthesia: Local infiltration  Local Anesthetic:  Lidocaine 1% without epinephrine  Anesthetic Total (mL):  5      SEDATION    Patient Sedated: No        Preparation: skin prepped with ChloraPrep  Skin prep agent: skin prep agent completely dried prior to procedure  Sterile barriers: maximum sterile barriers were used: cap, mask, sterile gown, sterile gloves, and large sterile sheet  Hand hygiene: hand hygiene performed prior to central venous catheter insertion  Type of line used: PICC and Power PICC  Catheter type: double lumen  Lumen type: non-valved  Catheter size: 5 Fr  Brand: Avalara  Lot number: [id number]  Placement method: venipuncture, MST and ultrasound  Number of attempts: 1  Successful placement: yes  Orientation: left  Location: basilic vein (vd 0.38 cm)  Arm circumference: adults 10 cm  Extremity circumference: 25  Visible catheter length: 3  Total catheter length: 43  Dressing and securement: blood cleaned with CHG, chlorhexidine  disc applied, dressing applied, glue, line secured, securement device, site cleaned, statlock and sterile dressing applied  Post procedure assessment: blood return through all ports, free fluid flow and placement verified by x-ray  PROCEDURE   Patient Tolerance:  Patient tolerated the procedure well with no immediate complications

## 2021-07-18 NOTE — PLAN OF CARE
Nursing Focus: Admission  D: Arrived at 2000 from [facility] via EMT. Patient accompanied by her daughter. Admitted for newly diagnosed ALL and further work up. Complains of some left hip pain rates at 6/10.      I: Admission process began.  Patient oriented to room, enviroment, call light.  Md. notified of patients arrival on unit.     A: Vital signs stable, afebrile.  Patient stable at this time.      P: Implement plan of care when available. Continue to monitor patient. Nursing interventions as appropriate. Notify md with changes in pt status.        2000 - 2330: Pt was admitted from [facility] for newly diagnosed ALL. A&O, mildly forgetful at times, AVSS on RA. Pt c/o of left hip pain and feeling tired, rates pain at 6/10, relief from tylenol.  Daughter present at bedside during admission.   PIV on SL.   Pt had falls x 4 at home d/t syncope/weakness & some equilibrium problem per pt, on fall precaution & on bed alarm.  WBC 0.9, ANC 0.7, neutropenic.  Up with assist x 1/gait/walker, had last bm 07/16, no stool today.  Plan for further work up and treatments here at [facility].  Continue with poc...

## 2021-07-18 NOTE — PROGRESS NOTES
Heme Malignancy Progress Note  Admission date: 7/17/2021   Hospital day: 1    Assessment and Plan  Heather Aden is a 76 year old female with a history of chronic HCV c/b advanced liver fibrosis and splenomegaly, B12 deficiency, Aortic sclerosis with insufficiency, admitted as a transfer from [facility] with newly diagnosed B-ALL.      #B-ALL  Presented to [facility] with days of falls, worsening functional status. Ultimately found to have progressive pancytopenia compared to prior, with worsening anemia and leukopenia especially. Had background leukopenia and thrombocytopenia, likely related to chronic liver disease. Found to have bone marrow biopsy with 84.5% blasts, with flow compatible with B-ALL. [place] chromosome FISH sent out to [facility] from [place], which is pending. Given her underlying cirrhosis and age, she is unlikely to be a transplant candidate, and would not likely be able to tolerate an intensive regimen (ever one with asparaginase). For today, since we do not yet know if she is Ph chromosome pos or neg, we will pre-phase with steroids, and await that final results before deciding on treatment.     Monitoring/Diganostics  - BMBx 7/15 at [place] w/84.5% blasts, flow c/w B-ALL (full results below)  - TTE w/LVEF 55-60%  - LP with CAPS tomorrow (7/18) along with IT methotrexate  - Pending: FISH from [facility], will call first thing in the AM  - Q12 labs for now:   - CBC with diff   - CMP, uric acid, Mg, Phos, LDH   - PT/INR, aPTT, fibrinogen   - Transfuse for Hgb <7, Plt <10 or <20 if bleeding  - Transfuse Cryo (5u) for Fibrinogen <100    Therapeutic  - Start Dexamethasone 20mg Daily today  - Start Ompeprazole 20mg daily  - Hold off on IV fluids for now given good PO and no TLS yet   - Will add fluids if evidence of TLS after Dex  - Consider Sevelamer if phos >5  - Continue allopurinol 300mg po daily   - Consider rasubircase if uric acid >8, or evidence of end organ damage  - PICC placed 7/18    Prophy:  -  Continue Acyclovir 400mg BID  - Continue Amber 50mg daily  - Continue levofloxacin 250mg daily  - Stop Bactrim given interaction with Cytoxan  - Pentamidine ordered for PJP ppx  - Continue allopurinol    #HCV w/advanced fibrosis  #HBV exposed, not active infection  Patient with known HCV infection, and previous HBV exposure (surface Ab positive, core Ab positive, surface Ag negative). Imaging and labs with mild coagulopathy, splenomegaly and thrombocytopenia prior to ALL diagnosis all consistent with cirrhosis/abdvanced fibrosis. Plan was for repeat imaging and AFP Q6 months. Recently started treatment with sofosbuvir/velpatasvir for HCV infection, although at [place] this was on hold, reportedly per Dr. Leventhal. Will need to discuss with hepatology about how to proceed with this. We will likely be able to restart as there is no particular interaction with chemo, although absorption may be reduced with PPI (which she should be on while on Dex). May also need HBV suppression (eg tenofovir), although this may not be needed while on her HCV medication. Plan for hepatology consult 7/19.   - Plan for Hepatology consult tomorrow  - Hold sofosbuvir/velpatasvir pending discussion with hepatology  - May need HBV suppressive therapy,   - CMP and coags, as above    #B12 deficiency  #Low-normal folate  Noted to have B12 of 127 with folate of 4 on admission at [facility]. Will plan to continue with parenteral B12. Will hold off on folate replacement in the setting of IT methotraxate.    - B12 1000mcg daily x 7 days, weekly x 1 month, monthly there after   - No folate replacement    #Chronic/Stable  - HTN: continue amlodipine 5mg daily  - Aortic sclerosis/insufficiency: Moderate AI seen on recent TTE. Continue to monitor    #FEN  - F: PO PRN  - E: Replete PRN  - D: Orders Placed This Encounter      Regular Diet Adult    PPx:  - GI: PPI as above   - DVT: Mechanical given borderline thrombocytopenia    Code Status: Full Code    Jaden  MD Leigh PhD  Heme/Onc/Transplant Fellow  Pgr #[id number]      Interval History  - Afebrile, vitals stable overnight  - Generally feeling well, no specific complaints this morning  - FISH from [facility] still pending, no results yet    ROS: Pertinent positive and negative systems described in HPI; the remainder of the 14 systems are negative    Physical Exam  /65 (BP Location: Right arm)   Pulse 105   Temp 98.9  F (37.2  C) (Oral)   Resp 20   Wt 62.9 kg (138 lb 9.6 oz)   SpO2 91%   Gen: Well appearing, in NAD  HEENT: EOMI, PERRL, mmm, oropharynx clear  CV: Normal rate, regular rhythm. No m/r/g  Pulm: CTAB, no wheezing, normal work of breathing  Abd: Soft, nt/nd, no rebound/guarding  Ext: Warm and well perfused. No lower extremity edema  Skin: Scattered spider aginomas  Neuro: Alert and answering questions appropriately. CNII-XII grossly intact. Moving all extremities without issue or focal neurologic deficits.   Access: L PIV dressing c/d/i     Labs:   Recent Labs   Lab Test 07/17/21 2037   WBC 0.9*   RBC 2.43*   HGB 7.7*   HCT 23.2*   MCV 96   MCH 31.7   MCHC 33.2   RDW 22.5*   PLT 86*     Recent Labs   Lab Test 07/17/21 2037 06/03/21  0957    139   POTASSIUM 3.4 3.5   CHLORIDE 105 105   CO2 25 23   ANIONGAP 7 11   * 90   BUN 8 11   CR 0.49* 0.55   CEASAR 8.1* 8.9     Recent Labs   Lab Test 07/17/21 2037   PROTTOTAL 5.4*   ALBUMIN 2.4*   BILITOTAL 1.5*   ALKPHOS 55   AST 52*   ALT 13     BMBx 7/15/21  Bone marrow, left posterior iliac crest, biopsy, touch preparation and peripheral blood -  1. B-lymphoblastic leukemia (B-ALL) (100% cellularity; 84.5% blasts).      2. Peripheral blood with pancytopenia and 28% circulating blasts.      Flow cytometric immunophenotyping studies are performed at [facility] ([id number]) and interpreted by Dr. Reaves.  Viability, 81%.  A precursor/blast population is detected that comprises approximately 78% of the total viable nucleated events as  defined by light scatter criteria with the following flow cytometric characteristics:     Brightly positive markers: CD58/  Moderately positive markers: CD34/CD38/CD19/CD66c/CD22/CD65/CD15/CD79a/(CYTO)/TDT(N)  Dimly positive markers: CD45  Trace positive markers: HLA-DR  Negative markers: CD10/CD9/CD20//CD33/CD13/CD56/CD11b/CD64//CD14/MA396d/CD2/CD7/MPO(CYTO)    Imaging:     TTE 7/15/21  Interpretation Summary   * The left ventricular systolic function is hyperdynamic, estimated LVEF  >70%.   * There is mild concentric left ventricular hypertrophy.   * The proximal ascending aorta is mildly dilated measuring 4.0 cm.   * Moderate left pleural effusion.   * There is a small loculated anterior pericardial effusion.   * There is moderate aortic regurgitation.   * Compared to prior study dated 05/27/2021, no major changes noted.

## 2021-07-18 NOTE — PROGRESS NOTES
Procedure team:    I spoke with heme team.   LP is for TOMORROW (Monday).  Pt consented.  Please contact us tomorrow (pager [phone]) to discuss timing, if IT chemo will be given, etc.    River Goodrich MD  Med-Peds Hospitalist  [phone]

## 2021-07-18 NOTE — PROGRESS NOTES
Rapid Response Team Note  Lab result 2330, RRT 0053  Assessment   In assessment a rapid response was called on Heather Aden due to SIRS/Sepsis trigger and lactic acidosis. This presentation is likely due to malignancy and worsened by malnutrition .   Recent admit pancytopenia and acute leukemia from B cell ALL. Work- up done at outside hospital. She remains afebrile. No new complains.      Plan   -  500 ml LR bolus  - blood cultures X 2  - repeat lactic acid    -  The Internal Medicine primary team was at bedside.  -  Disposition: The patient will remain on the current unit. We will continue to monitor this patient closely.  -  Reassessment and plan follow-up will be performed by the primary team      DASHAWN Fitzgerald CNP  [facility] RRT AMCOM Job Code Contact #[phone]    Hospital Course   Brief Summary of events leading to rapid response:   BPA for sepsis eval due to leukopenia and HR of 103    Admission Diagnosis:   ALL (acute lymphoblastic leukemia) (H) [C91.00]     Physical Exam   Temp: 99.1  F (37.3  C) Temp  Min: 98.4  F (36.9  C)  Max: 99.1  F (37.3  C)  Resp: 20 Resp  Min: 20  Max: 20  SpO2: 93 % SpO2  Min: 92 %  Max: 93 %  Pulse: 91 Pulse  Min: 91  Max: 107    No data recorded  BP: 102/43 Systolic (24hrs), Av , Min:102 , Max:137   Diastolic (24hrs), Av, Min:43, Max:52     I/Os: No intake/output data recorded.     Exam:   General: chronically ill appearing  Mental Status: AAOx4.      Significant Results and Procedures   Lactic Acid:   Recent Labs   Lab Test 21  2329   LACT 3.5*     CBC:   Recent Labs   Lab Test 21  2037 21  0957   WBC 0.9* 1.3*   HGB 7.7* 9.2*   HCT 23.2* 27.3*   PLT 86* 76*        Sepsis Evaluation   The patient is not known to have an infection.  NO EVIDENCE OF SEPSIS at this time.  Vital sign, physical exam, and lab findings are due to malignancy and poor nutrition.

## 2021-07-18 NOTE — PLAN OF CARE
Alert and oriented X 4. AVSS. Denies SOB or nausea. C/o left upper arm pain. Acetaminophen given with relief. Lactic acid 3.5. RRT called. Physician notified. Pt seen by RRT team. 500 mL LR bolus give. Up to bedside commode with assist of 1. Continent of bladder. Voided 275 mL. She reports difficulty falling asleep.

## 2021-07-18 NOTE — PLAN OF CARE
0700 - 1530: A&O ex mildly forgetful, AVSS, c/o left arm/hip pain 6/10, gave tylenol w/ some relief.   PICC placed this morning and good to use, purple line on TKO.  Started on IM vit B12 daily, got dose for Am today.  Up with assist x 2/gait/walker, very weak specially left leg not able to lift and move much, okay to do bedpan per pt.  Bed alarm on d/t multiple fall at home.    Voiding spontaneously, had small bm today, CHG bath & linen changed today.  Plan for LP tomorrow w/ IT chemo, most possibly start chemo tomorrow, no chemo orders in placed yet.  Continue with poc..      Addendum: Potassium check at 1200 came back 3.8, not replaced from AM labs.

## 2021-07-19 NOTE — PROGRESS NOTES
"CLINICAL NUTRITION SERVICES - ASSESSMENT NOTE     Nutrition Prescription    RECOMMENDATIONS FOR MDs/PROVIDERS TO ORDER:  None at this time     Malnutrition Status:    Severe malnutrition in the context of acute on chronic illness    Recommendations already ordered by Registered Dietitian (RD):  Continue current diet and supplement order    Future/Additional Recommendations:  -- monitor oral intake of meals and supplements, calorie counts as needed  -- monitor weight trends      REASON FOR ASSESSMENT  Heather Aden is a/an 76 year old female assessed by the dietitian for Admission Nutrition Risk Screen for positive    Per chart review patient with a  history of chronic HCV c/b advanced liver fibrosis and splenomegaly, B12 deficiency, Aortic sclerosis with insufficiency, 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      NUTRITION HISTORY  Per patient and family patient's intake has been decreased for months with weight loss. She takes ensure at home but only 1 daily (but only when her appetite is decreased). Her appetite has slightly increased since hospital admit. She had 1/2 egg, cantaloupe, english muffin for breakfast. Her daughter was ordering lunch for her and she also ordered an ensure enlive. She will order ensure enlive as she would like it.      CURRENT NUTRITION ORDERS  Diet: Regular with ensure enlive with meals   Intake/Tolerance: ~ 50-75%     LABS  Labs reviewed  (7/19): BUN 15 mg/dL, Cr 0.50 mg/dL (L), phos 4.7 mg/dL (H)     MEDICATIONS  Medications reviewed  Cyanocobalamin injection,     ANTHROPOMETRICS  Height: 162.6 cm (5' 4\")  Most Recent Weight: 63.7 kg (140 lb 6.4 oz)    IBW: 54.5 kg  BMI: Normal BMI  Weight History:   Wt Readings from Last 10 Encounters:   07/19/21 63.7 kg (140 lb 6.4 oz)   Dosing Weight: 64 kg (admit weight)    ASSESSED NUTRITION NEEDS  Estimated Energy Needs: [id number] kcals/day (25 - 30 kcals/kg)  Justification: Maintenance  Estimated Protein " Needs: 77-96 grams protein/day (1.2 - 1.5 grams of pro/kg)  Justification: Repletion  Estimated Fluid Needs: 1 mL/kcal  Justification: Maintenance    PHYSICAL FINDINGS  See malnutrition section below.    MALNUTRITION  % Intake: </=75% for >/= 1 month (severe)  % Weight Loss: Unable to assess  Subcutaneous Fat Loss: Facial region:  moderate  Muscle Loss: Temporal:  moderate and Thoracic region (clavicle, acromium bone, deltoid, trapezius, pectoral):  moderate  Fluid Accumulation/Edema: None noted  Malnutrition Diagnosis: Severe malnutrition in the context of acute on chronic illness    NUTRITION DIAGNOSIS  Inadequate oral intake related to decreased appetite as evidenced by recent weight loss and self report     INTERVENTIONS  Implementation  Nutrition Education: See education flowsheet     Goals  Patient to consume % of nutritionally adequate meal trays TID, or the equivalent with supplements/snacks.     Monitoring/Evaluation  Progress toward goals will be monitored and evaluated per protocol.    Jasmyn Jarrett, MS/RD/CHIQUITA/CNSC  6A/7D RD Pager: [phone]

## 2021-07-19 NOTE — PROCEDURES
DIAGNOSIS: B-ALL  PROCEDURE: Intrathecal chemo administration   LOCATION: Bedside with CAPS team  INDICATION:   Lumbar puncture performed and CSF samples collected by the CAPS  team under ultrasound   Chemotherapy was double-checked by this writer and bedside RN. This writer then administered methotrexate (PF) 12 mg in sodium chloride (PF) 0.9% PF 6 mL intrathecal inj (PF)  without apparent complication.  Labs ordered: flow, cell count, culture, glucose, protein  Complications: None immediately.   Chemo instillation performed by: LESLIE Almeida PA-C  Hematology/Oncology  Pager # [phone]

## 2021-07-19 NOTE — PLAN OF CARE
[id number].    A/Ox4, but forgetful at times. Afebrile. Tachycardic. OVSS on 1L O2 via NC. Denies pain, SOB at rest and N/V, but reports feeling very fatigued and YIP noted. Poor appetite - did not order dinner this evening. Pt voiding spontaneously into bedside commode with adequate UOP. L-PICC infusing LR TKO into purple port (red saline locked). Up with Ax2 + walker and GB. Bed alarm on for safety. Plan for LP and IT methotrexate tomorrow.

## 2021-07-19 NOTE — PROGRESS NOTES
Rapid Response Team Note    Assessment   In assessment a rapid response was called on Heather Aden due to SIRS/Sepsis trigger and lactic acidosis. This presentation is likely due to malignancy and worsened by malnutrition.     Plan   -  Recheck lactic acid with AM labs    Continue prophylaxis per primary team  - Continue Acyclovir 400mg BID  - Continue Amber 50mg daily  - Continue levofloxacin 250mg daily  - Stop Bactrim given interaction with Cytoxan  - Pentamidine ordered for PJP ppx    -  The Internal Medicine primary team was able to be reached and they are in agreement with the above plan.  -  Disposition: The patient will remain on the current unit. We will continue to monitor this patient closely.  -  Reassessment and plan follow-up will be performed by the primary team      DASHAWN Fitzgerald CNP  [facility] Job Code Contact #[phone]    Hospital Course   Brief Summary of events leading to rapid response:   BPA for sepsis eval due to leukopenia and HR of 104    Admission Diagnosis:   ALL (acute lymphoblastic leukemia) (H) [C91.00]     Physical Exam   Temp: 97.5  F (36.4  C) Temp  Min: 97.4  F (36.3  C)  Max: 98.9  F (37.2  C)  Resp: 20 Resp  Min: 16  Max: 20  SpO2: 95 % SpO2  Min: 90 %  Max: 96 %  Pulse: 103 Pulse  Min: 96  Max: 105    No data recorded  BP: 130/61 Systolic (24hrs), Av , Min:123 , Max:136   Diastolic (24hrs), Av, Min:53, Max:70     I/Os: I/O last 3 completed shifts:  In: 620 [P.O.:120; IV Piggyback:500]  Out: 875 [Urine:875]     Exam:   General: in no acute distress  Mental Status: baseline mental status.      Significant Results and Procedures   Lactic Acid:   Recent Labs   Lab Test 21  0254 21  0647 21  2329   LACT 2.4* 3.2* 3.5*     CBC:   Recent Labs   Lab Test 21  1818 21  0647 21  2037   WBC 0.9* 0.8* 0.9*   HGB 7.8* 7.1* 7.7*   HCT 23.8* 22.1* 23.2*   PLT 82* 72* 86*        Sepsis Evaluation   The patient is not  known to have an active infection.  NO EVIDENCE OF SEPSIS at this time.  Vital sign, physical exam, and lab findings are due to malignancy and poor oral intake.

## 2021-07-19 NOTE — PROGRESS NOTES
07/19/21 0850   Quick Adds   Type of Visit Initial PT Evaluation   Living Environment   People in home spouse   Current Living Arrangements house   Home Accessibility stairs to enter home;stairs within home   Number of Stairs, Main Entrance 3   Stair Railings, Main Entrance none   Number of Stairs, Within Home, Primary greater than 10 stairs   Stair Railings, Within Home, Primary railing on left side (ascending)   Transportation Anticipated car, drives self;family or friend will provide   Living Environment Comments Pt lives in a rambler style home with spouse. Spouse has been providing A for mobility in/out of the house for the last 3 mo. Pt states full flight to basement but doesn't have to use. Bedroom, bathroom, and kitchen on main floor.    Self-Care   Usual Activity Tolerance good   Current Activity Tolerance fair   Regular Exercise Yes   Activity/Exercise Type walking   Exercise Amount/Frequency daily   Equipment Currently Used at Home none   Activity/Exercise/Self-Care Comment Pt states she was very active >3 months ago but over the last 3 months has been unable to exercise or participate in any additioal activity. Spouse provides A for cleaning, cooking, laundry, light A on stairs, and transportation. Pt was not using an AD at home, ind with showering/bathing previously. Spouse did have an MI 4 mo ago so is limited but states no concerns when caring for pt at home over the last 3 mo.    Disability/Function   Hearing Difficulty or Deaf no   Wear Glasses or Blind no   Concentrating, Remembering or Making Decisions Difficulty no   Difficulty Communicating no   Difficulty Eating/Swallowing no   Walking or Climbing Stairs Difficulty yes   Walking or Climbing Stairs stair climbing difficulty, assistance 1 person   Mobility Management Spouse assists for safety   Dressing/Bathing Difficulty no   Toileting issues no   Doing Errands Independently Difficulty (such as shopping) yes   Errands Management spouse A  "  Fall history within last six months yes   Number of times patient has fallen within last six months 4   Change in Functional Status Since Onset of Current Illness/Injury yes   General Information   Onset of Illness/Injury or Date of Surgery 07/17/21   Referring Physician Latoya Brito PA-C   Patient/Family Therapy Goals Statement (PT) \"To improve walking, get stronger\"    Pertinent History of Current Problem (include personal factors and/or comorbidities that impact the POC) per medical chart \"Heather Aden is a 76 year old female admitted on 7/17/2021 comes from [facility] as a transfer for Newly diagnosed ALL needing inpatient treatment. \"   Existing Precautions/Restrictions fall   General Observations Activity: Other; per nursing algorithm   Cognition   Orientation Status (Cognition) oriented x 4   Affect/Mental Status (Cognition) WNL   Follows Commands (Cognition) WNL   Pain Assessment   Patient Currently in Pain No   Integumentary/Edema   Integumentary/Edema no deficits were identifed   Posture    Posture Protracted shoulders;Forward head position   Range of Motion (ROM)   ROM Comment B LE WNL, discomfort with AROM to L LE   Strength   Strength Comments Demo generally 3+/5 strength B BLE, limited to pain on L LE.    Bed Mobility   Comment (Bed Mobility) Rolling L and R with min A, Supine > sit with min A, Sit > supine with SBA, mod A for repo of LEs.    Transfers   Transfer Safety Comments min A x2 for bed <> chair with WW.    Gait/Stairs (Locomotion)   Comment (Gait/Stairs) Unable to ambulate >3 ft.    Balance   Balance Comments Continuous CGA > min A with stating at 2WW to remain upright    Sensory Examination   Sensory Perception patient reports no sensory changes   Clinical Impression   Criteria for Skilled Therapeutic Intervention yes, treatment indicated   PT Diagnosis (PT) Impaired functional mobility    Influenced by the following impairments weakness, pain, poor endurance "   Functional limitations due to impairments impaired gait, impaired transfers, impaired bed mobility, inability to complete stairs.    Clinical Presentation Stable/Uncomplicated   Clinical Presentation Rationale PMH, clinical judement   Clinical Decision Making (Complexity) low complexity   Therapy Frequency (PT) 5x/week   Predicted Duration of Therapy Intervention (days/wks) 2 weeks   Planned Therapy Interventions (PT) balance training;bed mobility training;gait training;home exercise program;neuromuscular re-education;patient/family education;ROM (range of motion);stair training;strengthening;transfer training;progressive activity/exercise   Anticipated Equipment Needs at Discharge (PT) shower chair;walker, rolling   Risk & Benefits of therapy have been explained evaluation/treatment results reviewed;care plan/treatment goals reviewed;risks/benefits reviewed;current/potential barriers reviewed;participants voiced agreement with care plan;participants included;patient;spouse/significant other   PT Discharge Planning    PT Discharge Recommendation (DC Rec) home with home care physical therapy   PT Rationale for DC Rec Pt below baseline level of function requiring A for all mobility, anticipating prolonged hospital stay. Pt and spouse strongly prefer dc home, pt will supportive family and means to purchase equipment, continue to monitor for TCU needs pending progress made with IP PT.    PT Brief overview of current status  Assist x2 for transfers   Total Evaluation Time   Total Evaluation Time (Minutes) 8

## 2021-07-19 NOTE — CONSULTS
[facility]    Hepatology Consult    Requesting provider: Dr. Hill    Consult requested for HCV and Hx of HBV exposure    HPI:  HCV  - dx May 2021  - ?acquisition  - GT-1a  - US elastography 6/3/2021= 2.37  - SOF-ELVIE since 7/3/2021    76 year old female with diagnosis of chronic hepatitis C, genotype 1 A, hepatitis B exposure who was transferred from [facility] with a diagnosis of B-ALL.    Patient had been started on treatment with Sofosbuvir/Velpatasvir(Epclusa) for her chronic HCV.  Patient states that for the past few months she has been having low-grade fever, lethargy and weakness.  Patient recently presented with syncope and dizziness to outside hospital where she was found to have anemia, pancytopenia, blasts on peripheral smear, new leptomeningeal enhancement on brain MRI, eventually got bone marrow biopsy on 7/15 showing ALL.   Denies hematochezia or melena.    Denies alcohol intake or smoking.        Medical hx Surgical hx   Past Medical History:   Diagnosis Date    Hepatitis B core antibody positive     Hepatitis C       Past Surgical History:   Procedure Laterality Date    NO HISTORY OF SURGERY            Medications  Current Facility-Administered Medications   Medication Dose Route Frequency    acyclovir  400 mg Oral BID    albuterol  2.5 mg Nebulization Q28 Days    And    pentamidine  300 mg Inhalation Q28 Days    allopurinol  300 mg Oral Daily    amLODIPine  5 mg Oral Daily    calcium acetate  667 mg Oral TID w/meals    cyanocobalamin  1,000 mcg Intramuscular Daily    Followed by    [START ON 7/25/2021] cyanocobalamin  1,000 mcg Intramuscular Weekly    dexamethasone  20 mg Oral Q12H [facility]    heparin lock flush  5-20 mL Intracatheter Q24H    levofloxacin  750 mg Oral Daily    micafungin  50 mg Intravenous Q24H    pantoprazole  40 mg Oral QPM    sodium chloride (PF)  10-40 mL Intracatheter Q8H    sodium chloride (PF)  10-40 mL Intracatheter Q8H    sofosbuvir-velpatasvir  " 1 tablet Oral Daily    traZODone  50 mg Oral At Bedtime       Allergies  No Known Allergies    Family hx Social hx   Family History   Problem Relation Age of Onset    Liver Disease No family hx of       Social History     Tobacco Use    Smoking status: None   Substance Use Topics    Alcohol use: None    Drug use: None          Review of systems  A 10-point review of systems was negative.      Examination  /57 (BP Location: Right arm)   Pulse 107   Temp 98  F (36.7  C) (Axillary)   Resp 22   Ht 1.626 m (5' 4\")   Wt 63.7 kg (140 lb 6.4 oz)   SpO2 95%   BMI 24.10 kg/m      Intake/Output Summary (Last 24 hours) at 7/19/2021 1247  Last data filed at 7/19/2021 1229  Gross per 24 hour   Intake 480 ml   Output 925 ml   Net -445 ml       Gen- weak and tired, A+Ox2  Eye- EOMI, no scleral icterus  ENT- MMM  CVS- RRR  RS- CTA  Abd-Soft , ND/NT, no fluid thrill, no palpable or percussible organomegaly   Skin- telangiectasia on chest wall and shoulders  Extr- bilateral patchy palmar erythema, 1+ DARIEN  Neuro- no asterixis    Laboratory  Lab Results   Component Value Date     07/19/2021     06/03/2021    POTASSIUM 4.0 07/19/2021    POTASSIUM 4.0 07/19/2021    POTASSIUM 3.5 06/03/2021    CHLORIDE 104 07/19/2021    CHLORIDE 105 06/03/2021    CO2 22 07/19/2021    CO2 23 06/03/2021    BUN 15 07/19/2021    BUN 11 06/03/2021    CR 0.50 07/19/2021    CR 0.55 06/03/2021       Lab Results   Component Value Date    BILITOTAL 1.5 07/17/2021    BILITOTAL 1.9 06/03/2021    ALT 13 07/17/2021    ALT 14 06/03/2021    AST 52 07/17/2021    AST 55 06/03/2021    ALKPHOS 55 07/17/2021    ALKPHOS 51 06/03/2021       Lab Results   Component Value Date    ALBUMIN 2.4 07/17/2021    ALBUMIN 3.2 06/03/2021    PROTTOTAL 5.4 07/17/2021    PROTTOTAL 6.7 06/03/2021        Lab Results   Component Value Date    WBC 1.5 07/19/2021    WBC 1.3 06/03/2021    HGB 7.4 07/19/2021    HGB 9.2 06/03/2021     07/19/2021    MCV 90 06/03/2021    "  07/19/2021    PLT 76 06/03/2021       Lab Results   Component Value Date    INR 1.21 07/19/2021    INR 1.16 06/03/2021       MELD-Na score: 12 at 7/20/2021  4:24 AM  MELD score: 12 at 7/20/2021  4:24 AM  Calculated from:  Serum Creatinine: 0.62 mg/dL (Using min of 1 mg/dL) at 7/20/2021  4:24 AM  Serum Sodium: 137 mmol/L at 7/20/2021  4:24 AM  Total Bilirubin: 1.4 mg/dL at 7/20/2021  4:24 AM  INR(ratio): 1.46 at 7/20/2021  4:24 AM  Age: 76 years  Radiology  US elastography June 2021 reviewed      Assessment  76 year old female with diagnosis of chronic hepatitis C, genotype 1 A, hepatitis B exposure who was transferred from [facility] with a diagnosis of B-ALL.    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  Patient has core antibody positive which would place her at increased risk of reactivating hepatitis B virus with IRS secondary to her chemotherapy. We would recommend Lamivudine for prophylaxis.  Her liver enzymes are mildly elevated with T bili 1.5, AST 52 along with hypoalbuminemia, her elastography showed stage IV fibrosis concerning for possible development of cirrhosis secondary to chronic HCV. Pt would need endoscopic exam to evaluate for esophageal varices and ideally we would like to perform before her chemo as she is at risk of worsening thrombocytopenia and leukopenia once chemotherapy has been commenced.    Recommendations  --Check HBV DNA  --Obtain abdominal ultrasound with Doppler  --EGD Wed AM- keep NPO after MN on Tuesday   --Continue HCV treatment with Epclusa  --Start Lamivudine once chemotherapy started    Thank you for involving us in this patient's care. Please do not hesitate to contact the GI service with any questions or concerns.     Pt care plan discussed with Dr. Green,  hepatology staff physician.    This note was created with voice recognition software, and while reviewed for accuracy, typos may remain.  Gerson Green MD  Hepatology  #[id number]

## 2021-07-19 NOTE — PROGRESS NOTES
Hematology / Oncology  Daily Progress Note   Date of Service: 07/19/2021  Patient: Heather Aden  MRN: [id number]  Admission Date: 7/17/2021  Hospital Day # 2    Assessment & Plan:   Heather Aden is a 76 year old female with history of chronic HCV c/b advanced liver fibrosis and splenomegaly, B12 deficiency, and aortic sclerosis with insufficiency who initially presented to [facility] with syncope and dizziness. Was found to have leptomeningeal enhancement on brain MRI and pancytopenia. 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 She was transferred to [facility] for further workup and mgmt of B-ALL.    Plan for today  - Called [facility] cytogenetics lab- FISH Ph negative, anticipate we will receive further FISH results via fax on 7/20  - Hepatology consulted. Appreciate recommendations;   - Abdominal ultrasound with doppler ordered for 7/20. NPO at midnight tonight   - EGD to eval for varices on 7/21. NPO at midnight on 7/20   - Resume sofosbuvir-velpatasvir (epclusa), continue through chemotherapy   - Recommend starting lamivudine along with chemotherapy as HBV ppx -- will discuss with pharmacy to verify coverage   - Follow CMP and coags  - Continue Dex 20mg daily, will likely start chemotherapy on 7/21 after the EGD  - S/p bedside LP with IT methotrexate today - flow in process  - Follow TLS/DIC labs once daily   - Levaquin increased to treatment dose 750mg daily due to OSH CT findings 7/13 concerning for possible pnuemonia  - Given pentamidine for PJP ppx today, continue monthly    HEME  # B-ALL  Patient initially presented to [facility] with days of fall, and worsening functional status. Ultimately found to have progressive pancytopenia compared to prior, with worsening anemia and leukopenia. She had background leukopenia and thrombocytopenia, likely related to chronic liver disease. 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   - PICC placed  7/18  - [place] [facility] [place]  - Given her underlying cirrhosis and age, she is unlikely to be a transplant candidate, and would not likely be able to tolerate an intensive regimen (ever one with asparaginase). Will pre-phase with steroids, and await that final results before deciding on treatment.   - Dexamethasone 20mg q12h 7/18- continue   - LP w/ IT Methotrexate 7/19 - flow in process  - Continue allopurinol 300mg daily. 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    GI  #HCV w/advanced fibrosis  #HBV exposed, not active infection  Patient with known HCV infection, and previous HBV exposure (surface Ab positive, core Ab positive, surface Ag negative). Imaging and labs with mild coagulopathy, splenomegaly and thrombocytopenia prior to ALL diagnosis all consistent with cirrhosis/abdvanced fibrosis. Plan was for repeat imaging and AFP Q6 months. Recently started treatment with sofosbuvir/velpatasvir for HCV infection, although at [place] this was on hold, reportedly per Dr. Leventhal. There is no particular interaction between sofosbuvir/velpatasvir and chemo, although absorption may be reduced with PPI (which she should be on while on Dex).  - Hepatology consulted 7/19. Appreciate recommendations;   - Abdominal ultrasound with doppler ordered for 7/20. NPO at midnight 7/19   - EGD to eval for varices on 7/21. NPO at midnight on 7/20   - Resume sofosbuvir-velpatasvir (epclusa) 7/19- continue through chemotherapy   - Recommend starting lamivudine along with chemotherapy as HBV ppx -- will discuss with pharmacy to verify  coverage  - Follow CMP and coags    ID   # Concern for community-acquired pneumonia  CT Chest at OSH 7/13/21 with small areas of ill-defined nodular opacities at the RUL and RML, probably post infectious/inflammatory in etiology. She has been afebrile on admission and denies cough, but has required 1L intermittently and endorses dyspnea on exertion. Previously not on O2.   - Levaquin 750mg once daily, continue for 7 days total (7/19-7/25)  - Patient also notes nasal congestion, consider ordering RVP if febrile    # ID PPX  - ACV 400mg BID (HSV 1+/2+)  - Levaquin increased to 750mg daily due to possible pneumonia, see above  - IV Micafungin 50mg daily. After completion of chemotherapy will de-escalate to ppx fluconazole if ANC <1000  - Nebulized pentamidine for PJP ppx given 7/19/21. Continue monthly, next pentamidine dose due ~ 8/19/21    PULM  # Dyspnea on exertion  # Mild to moderate left pleural effusion  # Atelectasis  # Hypoxia  CT PE at OSH on 7/13 with mild to moderate left basilar pleural effusion and compressive atelectasis. Also with possible inflammation/infection of the RUL and RML. She has remained afebrile. Was started on 1L oxygen at OSH, continued on admission. She endorses dyspnea on exertion, no cough. She is a nonsmoker, no history of lung disease. Etiology of pleural effusion is unclear, could be related to infection vs related to her cirrhosis which would be less likely.   - Antibiotics as above.   - Caution with IVF  - Supplemental O2 to maintain sats 90-95%. Wean as able.   - Incentive spirometry encouraged  - If she requires O2 closer to discharge will need further testing and a home O2 order    CV  # HTN. Continue amlodipine 5mg daily  # Aortic sclerosis/insufficiency. Moderate AI seen on recent TTE. Continue to monitor      FEN/Lytes  #B12 deficiency  #Low-normal folate  Noted to have B12 of 127 with folate of 4 on admission at [place]. Will plan to continue with parenteral B12. Will hold off  on folate replacement in the setting of IT methotraxate.               - Consider starting B12 1000mcg daily x 7 days, weekly x 1 month, monthly there after              - No folate replacement    # Severe malnutrition in the context of acute on chronic illness  - RDAT  - Supplements ordered  - Follow weights. Consider calorie counts after procedures    MISC  # Acute on Chronic Insomnia  She has history of chronic insomnia, acutely worse in the setting of steroids and acute illness. Pt reports that melatonin doesn't help.  - Trazodone 50mg at bedtime prn     FEN:  - Encouraged PO intake. Bolus fluids PRN  - PRN lyte replacement  - RDAT     Prophy/Misc:  - VTE: Held for procedures (EGD on 7/21)   - GI/PUD: PPI while on steroids  - Bowels: Senna and Miralax PRN  - Activity: As tolerated. PT consulted, recommending home with home PT    Consults: Hepatology, PT, CAPS team  CODE: Full code  Disposition: Admit to hospital for mgmt of new B-ALL. Ultimately anticipate discharge to home in 1-2 weeks  Follow up: Not yet established. Dr Hill was the admitting provider on service    Patient was seen and plan of care was discussed with attending physician Dr. Hill.    Gloria Rosenbaum PA-C  Hematology/Oncology  Pager # [phone]  ___________________________________________________________________    Subjective & Interval History:    Per nursing notes she was disoriented overnight. This morning she was alert and oriented x4. Her only new symptom was some mild nasal congestion. She endorses dyspnea on exertion. Has been walking to the restroom and back and up to the chair. No cough. Appetite is OK. Feels tired. LBM this morning. No dark tarry stools or blood in stool. A comprehensive review of systems was reviewed with the patient and the pertinent positives are listed in the HPI above.  Discussed the plan with the patient today. Supportive  at bedside.     Physical Exam:    Blood pressure 129/66, pulse 110,  "temperature (!) 96.7  F (35.9  C), temperature source Oral, resp. rate 20, height 1.626 m (5' 4\"), weight 63.7 kg (140 lb 6.4 oz), SpO2 96 %.    General: alert and cooperative thin female, lying in bed, no acute distress  HEENT: sclera anicteric, EOMI, MMM  Neck: supple, normal ROM  CV: RRR, normal S1/S2, no m/r/g  Resp: Diminished in left base, no wheezing/crackles, normal respiratory effort on ambient air  GI: soft, non-tender, non-distended, bowel sounds present and normoactive  MSK: warm and well-perfused, no edema. Decreased tone  Skin: no rashes on limited exam, no jaundice  Neuro: AOx3, moves all extremities equally, no focal deficits. Essential tremor noted in her hands today    Labs & Studies: I personally reviewed the following studies:  ROUTINE LABS (Last four results):  CMP  Recent Labs   Lab 07/19/21  0551 07/18/21 1818 07/18/21  1353 07/18/21  0647 07/17/21 2037    136  --  138 137   POTASSIUM 4.0  4.0 4.2 3.8 3.4 3.4   CHLORIDE 104 104  --  106 105   CO2 22 24  --  23 25   ANIONGAP 10 8  --  9 7   * 149*  --  81 117*   BUN 15 10  --  7 8   CR 0.50* 0.52  --  0.47* 0.49*   GFRESTIMATED >90 >90  --  >90 >90   CEASAR 7.9* 8.2*  --  7.8* 8.1*   MAG 2.1 1.8  --  1.8 1.8   PHOS 4.7* 4.2  --  3.0 2.9   PROTTOTAL  --   --   --   --  5.4*   ALBUMIN  --   --   --   --  2.4*   BILITOTAL  --   --   --   --  1.5*   ALKPHOS  --   --   --   --  55   AST  --   --   --   --  52*   ALT  --   --   --   --  13     CBC  Recent Labs   Lab 07/19/21  0551 07/18/21 1818 07/18/21  0647 07/17/21 2037   WBC 1.5* 0.9* 0.8* 0.9*   RBC 2.32* 2.42* 2.21* 2.43*   HGB 7.4* 7.8* 7.1* 7.7*   HCT 23.2* 23.8* 22.1* 23.2*    98 100 96   MCH 31.9 32.2 32.1 31.7   MCHC 31.9 32.8 32.1 33.2   RDW 22.5* 22.4* 22.6* 22.5*   * 82* 72* 86*     INR  Recent Labs   Lab 07/19/21  0551 07/18/21  1818 07/18/21  0647 07/17/21 2037   INR 1.21* 1.14 1.20* 1.18*       Microbiology  Recent Labs   Lab 07/19/21  0551 " 07/19/21  0254 07/18/21  0647 07/17/21  2329   LACT 3.3* 2.4* 3.2* 3.5*       Pertinent Imaging    Medications list for reference:  Current Facility-Administered Medications   Medication     acetaminophen (TYLENOL) tablet 650 mg     acyclovir (ZOVIRAX) tablet 400 mg     albuterol (PROVENTIL) neb solution 2.5 mg    And     pentamidine (NEBUPENT) neb solution 300 mg     allopurinol (ZYLOPRIM) tablet 300 mg     amLODIPine (NORVASC) tablet 5 mg     cyanocobalamin injection 1,000 mcg    Followed by     [START ON 7/25/2021] cyanocobalamin injection 1,000 mcg     dexamethasone (DECADRON) tablet 20 mg     heparin lock flush 10 UNIT/ML injection 5-20 mL     heparin lock flush 10 UNIT/ML injection 5-20 mL     levofloxacin (LEVAQUIN) tablet 250 mg     lidocaine (LMX4) cream     lidocaine 1 % 0.1-5 mL     LORazepam (ATIVAN) tablet 0.5-1 mg    Or     LORazepam (ATIVAN) injection 0.5-1 mg     melatonin tablet 10 mg     micafungin (MYCAMINE) 50 mg in sodium chloride 0.9 % 100 mL intermittent infusion     No rectal suppositories if WBC less than 1000/microliters or platelets less than 50,000/ L     omeprazole (priLOSEC) CR capsule 20 mg     ondansetron (ZOFRAN) injection 8 mg    Or     ondansetron (ZOFRAN-ODT) ODT tab 8 mg    Or     ondansetron (ZOFRAN) tablet 8 mg     polyethylene glycol (MIRALAX) Packet 17 g     prochlorperazine (COMPAZINE) tablet 5 mg    Or     prochlorperazine (COMPAZINE) injection 5 mg     senna-docusate (SENOKOT-S/PERICOLACE) 8.6-50 MG per tablet 1 tablet    Or     senna-docusate (SENOKOT-S/PERICOLACE) 8.6-50 MG per tablet 2 tablet     sodium chloride (PF) 0.9% PF flush 10-20 mL     sodium chloride (PF) 0.9% PF flush 10-20 mL     sodium chloride (PF) 0.9% PF flush 10-40 mL     sodium chloride (PF) 0.9% PF flush 10-40 mL     sodium chloride (PF) 0.9% PF flush 10-40 mL     [Held by provider] sofosbuvir-velpatasvir (EPCLUSA) per tablet 1 tablet     traZODone (DESYREL) tablet 100 mg

## 2021-07-19 NOTE — PLAN OF CARE
7a-3p: VSS although HR tachy at 110. Afebrile. No pain or nausea but appetite is poor. Did eat a muffin and fruit for breakfast and strawberry shortcake for lunch. Daughter helped her with lunch due to difficulty with coordination. Alert and oriented but does have intermittent confusion. One example was pt taking her meds this morning and took her ring off her finger and tried to put that into her mouth. Occasionally loses her train of thought and forgets what she was going to say. Bed alarm on for safety. Using the bedside commode with two assist due to weakness and deconditioning. Did have pentamidine neb treatment and LP with IT chemo today.

## 2021-07-19 NOTE — PLAN OF CARE
"Oriented X 3. Disoriented to time. She replied \"April\" when asked to state the month. Some confusion noted at beginning of shift. Was attempting to retrieve facial moisturizer from her purse but could not figure out how to do so. Easily redirected. Denies pain, SOB, nausea or abdominal discomfort. Lactic acid 2.4. RRT called. Repeat lactic acid draw ordered. Up to bedside commode with assist of 2. Voiding spontaneously with good urine output. Sleeping in between cares.   "

## 2021-07-19 NOTE — PROVIDER NOTIFICATION
07/19/21 0400   Call Information   Date of Call 07/19/21   Time of Call 0323   Name of person requesting the team Kee   Title of person requesting team RN   RRT Arrival time 0326   Time RRT ended 0340   Reason for call   Type of RRT Adult   Primary reason for call Sepsis suspected   Sepsis Suspected Elevated Lactate level;Heart Rate > 100;WBC <4 or >12   Was patient transferred from the ED, ICU, or PACU within last 24 hours prior to RRT call? No   SBAR   Situation Lactic Acid = 2.4   Background Admitted with dizziness, syncope, anemia.  Diagnosis with new ALL.     Notable History/Conditions Hypertension  (IVDU, Hep C, Aortic Stenosis)   Assessment VSS. Was started on 1 LPM of O2 yesterday afteroon to keep Sats > 92.   slightly tachycardic, has been on steroids, to start Chemo soon.   Lactic Acid level is improving.     Interventions Labs   Patient Outcome   Patient Outcome Stabilized on unit   RRT Team   Attending/Primary/Covering Physician Internal Medicine   Date Attending Physician notified 07/19/21   Time Attending Physician notified 0323   Physician(s) Xochitl Luna NP-C   Lead RN Carrie Nunez RN   RN Kee Walker RN   RT Jacob Douglass RT   Post RRT Intervention Assessment   Post RRT Assessment Stable/Improved   Date Follow Up Done 07/19/21   Time Follow Up Done 0545   Comments Repeat Lactic Acid level = 3.3

## 2021-07-19 NOTE — CONSULTS
Consult and Procedure Service - Procedure Note    Attending: Chandra Goodrich  Resident: Doug Lorenz  Procedure: Lumbar Puncture  Indication: Diagnostic and Intrathecal Chemotherapy  Risk Assessment: Low  Diagnosis: B-ALL  The risks and benefits of the procedure were explained to the patient who expressed understanding and opted to proceed.  Consent was obtained and placed in the chart.  A time out was performed.    The patient was placed in the left lateral decubitus position and the L4-5 innerspace palpated and marked.  3 ml of 1% lidocaine was instilled.  A 5 inch 22 gauge needle was inserted and fluid obtained on the second attempt.  Opening pressure was not performed. The stylet was replaced and the needle removed.   A total of 6 ml was removed. Intrathecal methotrexate was administered by hematology/oncology PA.   Patient tolerated the procedure well. Please do not hesitate to contact our service if any complications or concerns arise.   SIVA Lorenz MD  Internal Medicine-Pediatrics, [facility]    DOS:  7/19/2021

## 2021-07-19 NOTE — PLAN OF CARE
3p-7p: VS although HR tachy at 105. Afebrile. Continues with intermittent confusion. Will be confused and disoriented and then a half hour later will be oriented again. Declines dinner to be ordered for her stating she is not hungry. Bed alarm on for safety.

## 2021-07-20 PROBLEM — C91.00 ACUTE LYMPHOBLASTIC LEUKEMIA (ALL) NOT HAVING ACHIEVED REMISSION (H): Status: ACTIVE | Noted: 2021-01-01

## 2021-07-20 NOTE — PROGRESS NOTES
07/20/21 0400   Call Information   Date of Call 07/20/21   Time of Call 0444   Name of person requesting the team Joseph   Title of person requesting team RN   RRT Arrival time 0448   Time RRT ended 0458   Reason for call   Type of RRT Adult   Primary reason for call Sepsis suspected   Sepsis Suspected Elevated Lactate level;Heart Rate > 100;WBC <4 or >12   Was patient transferred from the ED, ICU, or PACU within last 24 hours prior to RRT call? No   SBAR   Situation Lactic Acid 3.4   Background History notable for hepatitis C and hep B core Ab positive, HSV, and history of substance use disorder. Presented to outside hospital after syncope and found to be pancytopenic. Bone marrow biopsy confirmed B-ALL. MRI brain with indeterminate lesions that could be CNS ALL but not further evaluated.   Notable History/Conditions Cancer;Hypertension   Assessment A&O to person, place and situation. HR low 100's. Denies pain, nausea or dyspnea.   Interventions No interventions   Patient Outcome   Patient Outcome Stabilized on unit   RRT Team   Date Attending Physician notified 07/20/21   Physician(s) TAMIKO Saba   Lead RN Brown Ruiz   RT Bill   Post RRT Intervention Assessment   Post RRT Assessment Stable/Improved   Date Follow Up Done 07/20/21   Time Follow Up Done 0725   Comments sleeping comfortably

## 2021-07-20 NOTE — PROGRESS NOTES
Hematology / Oncology  Daily Progress Note   Date of Service: 07/20/2021  Patient: Heather Aden  MRN: [id number]  Admission Date: 7/17/2021  Hospital Day # 3    Assessment & Plan:   Heather Aden is a 76 year old female with history of chronic HCV c/b advanced liver fibrosis and splenomegaly, B12 deficiency, and aortic sclerosis with insufficiency who initially presented to [facility] with syncope and dizziness. Was found to have leptomeningeal enhancement on brain MRI and pancytopenia. 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 She was transferred to [facility] for further workup and mgmt of B-ALL.    Plan for today  - Appreciate Hepatology recommendations;   - Abdominal ultrasound with doppler completed today   - EGD to eval for varices on 7/21. NPO at midnight on 7/20   - Started lamivudine 100mg daily for HBV ppx   - Follow CMP and coags  - Pt disoriented and confused overnight. Query if related to steroids vs liver function. D/w Hepatology, start lactulose 20mg daily. Hold or decrease dose if >3 BMs per day. Decreased dexamethasone to 20mg daily  - Will likely start chemotherapy (PETHEMA) on 7/21 after the EGD  - S/p bedside LP with IT methotrexate 7/19 - flow in process  - Levaquin increased to treatment dose 750mg daily due to OSH CT findings 7/13 concerning for possible pneumonia, continue for 7 days (7/19-7/25)  - Phos 5.8, started phoslo 667mg TID and will follow q12h. Dose-adjust as needed  - O2 with rest and light activity remained 92-93% on room air. Consider repeat CXR if she develops hypoxia.     HEME  # B-ALL   Patient initially presented to [facility] with days of fall, and worsening functional status. Ultimately found to have progressive pancytopenia compared to prior, with worsening anemia and leukopenia. She had background leukopenia and thrombocytopenia, likely related to chronic liver disease. Bone marrow biopsy 7/15/21 with 84.5% blasts, flow compatible  with B-ALL.   - PICC placed 7/18  - FISH is being processed at [facility] - Ph negative  - Given her underlying cirrhosis and age, she is unlikely to be a transplant candidate, and would not likely be able to tolerate an intensive regimen (ever one with asparaginase). Will pre-phase with steroids, and await that final results before deciding on treatment.   - Dexamethasone 20mg q12h 7/18-7/19. Decreased to 20mg once daily 7/20.   - LP w/ IT Methotrexate 7/19 - flow in process  - Repeat peripheral flow 7/19 - in process  - Continue allopurinol 300mg daily. 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 Follow Phos q12h  - Additional correction of electrolyte abnormalities (K+, Ca++) PRN per usual means.    GI  #HCV w/ compensated cirrhosis  #HBV exposed, not active infection  #Hyperbilirubinemia  #Mild transaminitis  Patient with known HCV infection, and previous HBV exposure (surface Ab positive, core Ab positive, surface Ag negative). Imaging and labs with mild coagulopathy, splenomegaly and thrombocytopenia prior to ALL diagnosis all consistent with cirrhosis/abdvanced fibrosis. Plan was for repeat imaging and AFP Q6 months. Recently started treatment with sofosbuvir/velpatasvir for HCV infection, although at [place] this was on hold, reportedly per Dr. Leventhal. There is no particular interaction between sofosbuvir/velpatasvir and chemo, although absorption may be reduced with PPI (which she should be on while on Dex).  - Hepatology consulted 7/19. Appreciate recommendations;   - Abdominal ultrasound with doppler 7/20- see detailed report   - EGD to eval for varices on 7/21. NPO at midnight on 7/20   - Resume sofosbuvir-velpatasvir  (epclusa) 7/19- continue through chemotherapy   - Recommend starting lamivudine along with chemotherapy as HBV ppx, this has a higher rate of resistance but is favored over tenofovir and entacavir whennot needed long term (<1 year). Started lamivudine 100mg daily 7/20, continue through chemotherapy  - Follow CMP and coags    ID   # Concern for community-acquired pneumonia  # Lactic acidosis  CT Chest at OSH 7/13/21 with small areas of ill-defined nodular opacities at the RUL and RML, probably post infectious/inflammatory in etiology. She has been afebrile on admission and denies cough, but has required 1L intermittently and endorses dyspnea on exertion. Previously not on O2.   - Levaquin 750mg once daily, continue for 7 days total (7/19-7/25)  - Lactic acid 2-3.5. No other infectious symptoms, low suspicion for sepsis. Likely due to underlying leukemia and liver disease    # ID PPX  - ACV 400mg BID (HSV 1+/2+)  - Levaquin increased to 750mg daily due to possible pneumonia, see above  - IV Micafungin 50mg daily. After completion of chemotherapy will de-escalate to ppx fluconazole if ANC <1000  - Nebulized pentamidine for PJP ppx given 7/19/21. Continue monthly, next pentamidine dose due ~ 8/19/21    PULM  # Dyspnea on exertion  # Mild to moderate left pleural effusion  # Atelectasis  # Hypoxia  CT PE at OSH on 7/13 with mild to moderate left basilar pleural effusion and compressive atelectasis. Also with possible inflammation/infection of the RUL and RML. She has remained afebrile. Was started on 1L oxygen at OSH, continued on admission. She endorses dyspnea on exertion, no cough. She is a nonsmoker, no history of lung disease. Etiology of pleural effusion is unclear, could be related to infection vs related to her cirrhosis which would be less likely.   - Antibiotics as above.   - Caution with IVF  - Supplemental O2 to maintain sats 90-95%. Wean as able. Most recently 92-93% on room air with minimal activity as  tolerated  - Incentive spirometry encouraged  - If she requires O2 closer to discharge will need further testing and a home O2 order    PSYCH  # Disorientation, restlessness, forgetfulness  Patient has been increasingly restless and disoriented overnight 7/18, and 7/19. She is forgetful and sometimes nonsensical. No aphasia. Symptoms generally resolve by mid-morning. No other neurodeficits.   - Could be medication related, she has been receiving dex 20mg BID. Decreased to once daily qam on 7/20.   - Query if related to her liver disease. No asterixis. Per pt report she is having <1BM per day. Per Hepatology, an ammonia level would not be helpful in this setting as it is a clinical diagnosis, but would recommend starting lactulose 20mg daily (7/20- ), hold or decrease to 10mg daily if >3 BMs/day    # Acute on Chronic Insomnia  She has history of chronic insomnia, acutely worse in the setting of steroids and acute illness. Pt reports that melatonin doesn't help.  - Trazodone 50mg at bedtime prn     CV  # Sinus tachycardia  Baseline heart rate unclear per chart review. HR up to 140s. EKG with sinus tachycardia. No chest pain or dyspnea at rest. Could be multifactorial in the setting with steroids and borderline hypovolemia secondary to poor PO fluid intake. Echo 7/17/21 - EF 55-60%, otherwise normal.   - Decrease pre-phase steroids to 20mg once daily 7/20  - 500cc NS bolus given on 7/19 and 7/20. Hold continuous fluids and limit IVF boluses due to known pleural effusion and risk for pulmonary edema    # HTN. Continue amlodipine 5mg daily  # Aortic sclerosis/insufficiency. Moderate AI seen on recent TTE. Continue to monitor    FEN/Lytes  #B12 deficiency  #Low-normal folate  Noted to have B12 of 127 with folate of 4 on admission at [place]. Will plan to continue with parenteral B12. Will hold off on folate replacement in the setting of IT methotraxate.               - Started oral B12 (per pt preference) 1000mcg daily x 7  "days (7/18-7/24), weekly x 1 month, monthly thereafter              - No folate replacement    # Severe malnutrition in the context of acute on chronic illness  - RDAT  - Supplements ordered  - Follow weights. Consider calorie counts after procedures      FEN:  - Encouraged PO intake. Bolus fluids PRN  - PRN lyte replacement  - RDAT     Prophy/Misc:  - VTE: Held for procedures (EGD on 7/21)   - GI/PUD: PPI while on steroids  - Bowels: Senna and Miralax PRN  - Activity: As tolerated. PT consulted, recommending home with home PT    Consults: Hepatology, PT, CAPS team  CODE: Full code  Disposition: Admit to hospital for mgmt of new B-ALL. Ultimately anticipate discharge to home in 1-2 weeks  Follow up: Not yet established. Dr Hill was the admitting provider on service    Patient was seen and plan of care was discussed with attending physician Dr. Hill.    Gloria Rosenbaum PA-C  Hematology/Oncology  Pager # [phone]  ___________________________________________________________________    Subjective & Interval History:    Per nursing notes she was disoriented overnight. This morning she was alert and oriented x4. She endorses dyspnea on exertion, remains stable on room air. Has been walking to the restroom and back and up to the chair. No cough. Appetite is OK. Feels tired. LBM yesterday morning. No dark tarry stools or blood in stool. A comprehensive review of systems was reviewed with the patient and the pertinent positives are listed in the HPI above.  Discussed the plan with the patient today. Supportive  at bedside.     Physical Exam:    Blood pressure 126/52, pulse (!) 130, temperature 96.9  F (36.1  C), temperature source Oral, resp. rate 20, height 1.626 m (5' 4\"), weight 59.8 kg (131 lb 14.4 oz), SpO2 95 %.    General: alert and cooperative thin female, lying in bed, no acute distress  HEENT: sclera anicteric, EOMI, MMM  Neck: supple, normal ROM  CV: RRR, normal S1/S2, no m/r/g  Resp: Diminished in " left base, no wheezing/crackles, normal respiratory effort on ambient air  GI: soft, non-tender, non-distended, bowel sounds present and normoactive  MSK: warm and well-perfused, no edema. Decreased tone  Skin: no rashes on limited exam, no jaundice  Neuro: AOx3, moves all extremities equally, no focal deficits. Essential tremor noted in her hands today. No asterixis    Labs & Studies: I personally reviewed the following studies:  ROUTINE LABS (Last four results):  CMP  Recent Labs   Lab 07/20/21 0424 07/19/21 0551 07/18/21 1818 07/18/21  1353 07/18/21  0647 07/17/21 2037    136 136  --  138 137   POTASSIUM 4.2 4.0  4.0 4.2 3.8 3.4 3.4   CHLORIDE 105 104 104  --  106 105   CO2 21 22 24  --  23 25   ANIONGAP 11 10 8  --  9 7   * 151* 149*  --  81 117*   BUN 26 15 10  --  7 8   CR 0.62 0.50* 0.52  --  0.47* 0.49*   GFRESTIMATED 88 >90 >90  --  >90 >90   CEASAR 7.6* 7.9* 8.2*  --  7.8* 8.1*   MAG 2.3 2.1 1.8  --  1.8 1.8   PHOS 5.8* 4.7* 4.2  --  3.0 2.9   PROTTOTAL 5.4*  --   --   --   --  5.4*   ALBUMIN 2.4*  --   --   --   --  2.4*   BILITOTAL 1.4*  --   --   --   --  1.5*   ALKPHOS 52  --   --   --   --  55   AST 63*  --   --   --   --  52*   ALT 17  --   --   --   --  13     CBC  Recent Labs   Lab 07/20/21 0424 07/19/21 0551 07/18/21 1818 07/18/21  0647   WBC 1.1* 1.5* 0.9* 0.8*   RBC 2.26* 2.32* 2.42* 2.21*   HGB 7.1* 7.4* 7.8* 7.1*   HCT 22.6* 23.2* 23.8* 22.1*    100 98 100   MCH 31.4 31.9 32.2 32.1   MCHC 31.4* 31.9 32.8 32.1   RDW 22.6* 22.5* 22.4* 22.6*   PLT 80* 105* 82* 72*     INR  Recent Labs   Lab 07/20/21  0424 07/19/21  0551 07/18/21  1818 07/18/21  0647   INR 1.46* 1.21* 1.14 1.20*       Microbiology  Recent Labs   Lab 07/20/21  0424 07/19/21  0551 07/19/21  0254 07/18/21  0647   LACT 3.4* 3.3* 2.4* 3.2*       Pertinent Imaging    Medications list for reference:  Current Facility-Administered Medications   Medication     0.9% sodium chloride BOLUS     acetaminophen (TYLENOL)  tablet 650 mg     acyclovir (ZOVIRAX) tablet 400 mg     albuterol (PROVENTIL) neb solution 2.5 mg    And     pentamidine (NEBUPENT) neb solution 300 mg     allopurinol (ZYLOPRIM) tablet 300 mg     amLODIPine (NORVASC) tablet 5 mg     calcium acetate (PHOSLO) capsule 667 mg     [START ON 7/21/2021] cyanocobalamin (VITAMIN B-12) tablet 1,000 mcg     [START ON 7/25/2021] cyanocobalamin (VITAMIN B-12) tablet 1,000 mcg     [START ON 7/21/2021] dexamethasone (DECADRON) tablet 20 mg     heparin lock flush 10 UNIT/ML injection 5-20 mL     heparin lock flush 10 UNIT/ML injection 5-20 mL     levofloxacin (LEVAQUIN) tablet 750 mg     lidocaine (LMX4) cream     lidocaine 1 % 0.1-5 mL     LORazepam (ATIVAN) tablet 0.5-1 mg    Or     LORazepam (ATIVAN) injection 0.5-1 mg     melatonin tablet 10 mg     micafungin (MYCAMINE) 50 mg in sodium chloride 0.9 % 100 mL intermittent infusion     No rectal suppositories if WBC less than 1000/microliters or platelets less than 50,000/ L     ondansetron (ZOFRAN) injection 8 mg    Or     ondansetron (ZOFRAN-ODT) ODT tab 8 mg    Or     ondansetron (ZOFRAN) tablet 8 mg     pantoprazole (PROTONIX) EC tablet 40 mg     polyethylene glycol (MIRALAX) Packet 17 g     prochlorperazine (COMPAZINE) tablet 5 mg    Or     prochlorperazine (COMPAZINE) injection 5 mg     senna-docusate (SENOKOT-S/PERICOLACE) 8.6-50 MG per tablet 1 tablet    Or     senna-docusate (SENOKOT-S/PERICOLACE) 8.6-50 MG per tablet 2 tablet     sodium chloride (PF) 0.9% PF flush 10-20 mL     sodium chloride (PF) 0.9% PF flush 10-20 mL     sodium chloride (PF) 0.9% PF flush 10-40 mL     sodium chloride (PF) 0.9% PF flush 10-40 mL     sodium chloride (PF) 0.9% PF flush 10-40 mL     sofosbuvir-velpatasvir (EPCLUSA) per tablet 1 tablet     traZODone (DESYREL) tablet 50 mg

## 2021-07-20 NOTE — PLAN OF CARE
Time: 4079-4319    Afebrile with HR in 140-150s, EKG reading of sinus tach, provider aware and 500mL bolus given.On RA. Disoriented to situation and year. Very frustrated/agitated with being unable to remember how to do ADLs (forgot phone lock PIN, remembering 's phone number, needing repeated directions). Ate 50% of chicken noodle soup and had ginger ale. To be NPO at midnight for abdominal ultrasound. Home med labelled from pharmacy and in pt med bin. BA on.     Time: 0666-3292  Afebrile and satting 89-90% on RA. 1-2L of O2 applied and now satting 96%. HR resolved to low 100s. Sepsis BPA triggered, lactic of 3.4. RRT and provider paged, no further interventions. A/Ox4. Calm and cooperative. NPO. Endorses leg pain, tylenol given with relief. Adequate UO into bedside commode. Ax2 with walker and gait belt. BA on. Continue with POC.

## 2021-07-20 NOTE — CONSULTS
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     Reason for Consult: Discharge Planning, Elevated unplanned readmission risk score  Advance Care Planning: Reviewed: Yes        Communication Assessment  Patient's communication style: Spoken language (English or Bilingual)    Hearing Difficulty or Deaf: no   Wear Glasses or Blind: no    Cognitive  Cognitive/Neuro/Behavioral: .WDL except  Level of Consciousness: alert  Arousal Level: opens eyes spontaneously, arouses to voice  Orientation: oriented x 4  Mood/Behavior: cooperative, calm  Best Language: 0 - No aphasia  Speech: clear, spontaneous, logical    Living Environment:   People in home: Spouse     Current living Arrangements: House      Able to return to prior arrangements: Yes     Family/Social Support:  Care provided by: Self, spouse  Provides care for: No one     Description of Support System: Supportive, Involved       Current Resources:   Patient receiving home care services: No  Community Resources: None  Equipment currently used at home: None  Supplies currently used at home: None    Employment/Financial:  Employment Status: Retired        Financial Concerns: No concerns identified         Lifestyle & Psychosocial Needs:  Social Determinants of Health     Tobacco Use:      Smoking Tobacco Use:      Smokeless Tobacco Use:    Alcohol Use:      Frequency of Alcohol Consumption:      Average Number of Drinks:      Frequency of Binge Drinking:    Financial Resource Strain:      Difficulty of Paying Living Expenses:    Food Insecurity:      Worried About Running Out of Food in the Last Year:      Ran Out of Food in the Last Year:    Transportation Needs:      Lack of Transportation (Medical):      Lack  of Transportation (Non-Medical):    Physical Activity:      Days of Exercise per Week:      Minutes of Exercise per Session:    Stress:      Feeling of Stress :    Social Connections:      Frequency of Communication with Friends and Family:      Frequency of Social Gatherings with Friends and Family:      Attends [employer] Services:      Active Member of Clubs or Organizations:      Attends Club or Organization Meetings:      Marital Status:    Intimate Partner Violence:      Fear of Current or Ex-Partner:      Emotionally Abused:      Physically Abused:      Sexually Abused:    Depression: Not at risk     PHQ-2 Score: 0   Housing Stability:      Unable to Pay for Housing in the Last Year:      Number of Places Lived in the Last Year:      Unstable Housing in the Last Year:        Functional Status:  Prior to admission patient needed assistance:   Dependent ADLs: Bathing, Dressing, Ambulation-no assistive device (spouse assist's with mobility)  Dependent IADLs: Cleaning, Cooking, Laundry, Shopping, Transportation, Meal Preparation  Assesssment of Functional Status: Not at functional baseline    Mental Health Status:  Mental Health Status: No Current Concerns       Chemical Dependency Status:  Chemical Dependency Status: No Current Concerns           Values/Beliefs:  Spiritual, Cultural Beliefs, [employer]ism Practices, Values that affect care: No               Additional Information:    Patient was transferred from [facility] for further workup and management of B-ALL.    CM assessment being completed due to discharge planning and elevated unplanned readmission risk score.     PT consulted and is recommending HHPT upon discharge.     Per care team, within the last 3 months, patient has needed assistance from spouse for cleaning, cooking, laundry, transportation, bathing, dressing and mobility. Prior to this patient was independent with all ADLs/IADLs and did not receive any in-home supports or services.    Care  Management will continue to follow and assist with discharge planning as needed.    Jennifer Lau, RN, BSN, PHN  Care Coordinator   P: [phone], [facility]

## 2021-07-20 NOTE — PROVIDER NOTIFICATION
"Web-based messaging to [phone]     \"7D - 7512- S.V.  Lactic level of 3.4 after sepsis BPA. RRT called.  Ania, [phone]\"  "

## 2021-07-20 NOTE — CODE/RAPID RESPONSE
Rapid Response Team Note    Assessment   In assessment a rapid response was called on Heather Aden due to SIRS/Sepsis trigger. This presentation is likely due to B-ALL and worsened by liver cirrhosis.     Plan   -  No additional fluids/antibiotics indicated at this time.  -  The Hematology/Oncology primary team was able to be reached and they are in agreement with the above plan.  -  Disposition: The patient will remain on the current unit. We will continue to monitor this patient closely.  -  Reassessment and plan follow-up will be performed by the primary team      DEVON HIDALGO PA  [facility] RRT AMCOM Job Code Contact #[phone]    Hospital Course   Brief Summary of events leading to rapid response:   RRT called for lactic acid level 3.4, triggered by tachycardia (106) and leukopenia (1.1). Patient is hear w/ new dx of ALL. Lactate has been elevated in the 3-4 range consistently. She received fluid bolus earlier in the night for tachycardia to the 130s-140s. She reports feeling thirsty but otherwise feeling okay, no new c/c otherwise.    Admission Diagnosis:   ALL (acute lymphoblastic leukemia) (H) [C91.00]     Physical Exam   Temp: 98  F (36.7  C) Temp  Min: 96  F (35.6  C)  Max: 98.2  F (36.8  C)  Resp: 16 Resp  Min: 16  Max: 24  SpO2: 96 % SpO2  Min: 91 %  Max: 96 %  Pulse: 106 Pulse  Min: 105  Max: 152    No data recorded  BP: 118/57 Systolic (24hrs), Av , Min:118 , Max:138   Diastolic (24hrs), Av, Min:54, Max:67     I/Os: I/O last 3 completed shifts:  In: 360 [P.O.:240; I.V.:120]  Out: 575 [Urine:575]     Exam:   General: chronically ill appearing  Mental Status: AAOx4.      Significant Results and Procedures   Lactic Acid:   Recent Labs   Lab Test 21  0424 21  0551 21  0254   LACT 3.4* 3.3* 2.4*     CBC:   Recent Labs   Lab Test 21  0424 21  0551 21  1818   WBC 1.1* 1.5* 0.9*   HGB 7.1* 7.4* 7.8*   HCT 22.6* 23.2* 23.8*   PLT 80* 105* 82*         Sepsis Evaluation   The patient is not known to have an infection.  NO EVIDENCE OF SEPSIS at this time.  Vital sign, physical exam, and lab findings are due to malignancy and c/b impaired hepatic clearance 2/2 cirrhosis.

## 2021-07-20 NOTE — PROVIDER NOTIFICATION
"Web-based messaging to [phone]    \"7D - 7512 - S.V.  HR in 140s-150s. EKG ordered.  Ania, [phone]\"  "

## 2021-07-20 NOTE — PROVIDER NOTIFICATION
MD Morataya Notified at 2200:  7D: S.V. EKG completed in rm 7512; showing sinus tach. Pt HR still in 140s; asymptomatic. Do we need any additional interventions? Ewa [phone]

## 2021-07-21 NOTE — PROGRESS NOTES
Pt triggered sepsis due to high HR and WBC of 0.9. order for lactic acid and VS monitoring ordered.

## 2021-07-21 NOTE — PROCEDURES
Brief procedure note    Upper endoscopy performed in endoscopy unit with conscious sedation    Esophagus  Large esophageal varices- bandedx2    Stomach  Mild portal hypertensive gastropathy seen in entire stomach.  No gastric varices.    Duodenum  Normal    Assessment  Large esophageal varices secondary to HCV cirrhosis.  No active bleeding.  Banded for primary prophylaxis of variceal bleeding in context of upcoming chemotherapy for ALL.    Recommend  1.  Return to hospital floor  2.  Full liquid diet today  3.  Regular diet from tomorrow  4.  Follow-up EGD in 6 weeks  5.  Continue HCV therapy  6.  Continue lamivudine    Gerson Green MD  Hepatology  [facility]

## 2021-07-21 NOTE — PLAN OF CARE
4818-3120:  OVSS on RA, afebrile. Mild tachy at baseline, O2 sats >94%, normotensive. AAOx4, calm/cooperative, follows commands appropriately. Daughter was at bedside at beginning of shift. Ax2 with gait belt & walker to bedside commode. First dose of lactulose given on day shift today. Pt had 5 loose BM's on evening shift, hold next dose of lactulose per protocol. Adequate UO. Denies N/V, denies pain. Pt is to be NPO starting at midnight, EGD scheduled for tmrw morning. Increased confusion around bedtime, reoriented to situation & plan. Continue to monitor & with POC.

## 2021-07-21 NOTE — PLAN OF CARE
"  Problem: Adult Inpatient Plan of Care  Goal: Optimal Comfort and Wellbeing  Outcome: Improving     Problem: Nausea and Vomiting (Chemotherapy Effects)  Goal: Fluid and Electrolyte Balance  Outcome: Improving       /53 (BP Location: Right arm)   Pulse (!) 124   Temp 97.2  F (36.2  C) (Oral)   Resp 18   Ht 1.626 m (5' 4\")   Wt 59.8 kg (131 lb 14.4 oz)   SpO2 94%. HR continues to be elevated. Denies having shortness of breath. Pt denies having pain. Pt was assisted with assist of one staff to use the commode. Pt was continent of bladder. Voided 450 ml in the commode. Denies having pain or discomfort. Pt is sleeping at this time. Will continue to monitor.  "

## 2021-07-21 NOTE — PROGRESS NOTES
Call received from lab with critical result of WBC of 0.9. Message sent to MD. Call back pending.

## 2021-07-21 NOTE — PROGRESS NOTES
"     -Pt  has been having waxing/ waning epsidoes of confusion and forgetfulness. Lactulose  was started today for goal of 3/ B\"'s a day. Explain this to pt and she was ok with it.      -Abd U/S completed. Pt will need to be NPO after midnight for EGD.      -Heart rate (130's) this morning. NS 500ml bolus given over 3 hrs.      -Elevated phos  level (5.8) this morning. Schedule PhosLO was started.      -Up stting in the charir and pt tolerated it well.   "

## 2021-07-21 NOTE — PLAN OF CARE
Pt received NS 500ml bolus at 07:30 for lactic acid 3.6 and HR in 130's, asymptomatic. HR down to 110's post bolus flush. Pt went down to EGD this am and tolerated it well. Pt placed on full liquid diet and tolerated it well.     This afternoon, pt having irregular heart rhythm. Robyn MEEHAN (HUSSEIN) notified. Blood cultures ordered. EKG completed and telemetry monitoring was started. Heart rated ranging in 's at times. Pt having occasional PAC's.    -Pt to get chemo treatment tonight.

## 2021-07-21 NOTE — PROGRESS NOTES
Hematology / Oncology  Daily Progress Note   Date of Service: 07/21/2021  Patient: Heather Aden  MRN: [id number]  Admission Date: 7/17/2021  Hospital Day # 4    Assessment & Plan:   Heather Aden is a 76 year old female with history of chronic HCV c/b advanced liver fibrosis and splenomegaly, B12 deficiency, and aortic sclerosis with insufficiency who initially presented to [facility] with syncope and dizziness. Was found to have leptomeningeal enhancement on brain MRI and pancytopenia. 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 She was transferred to [facility] for further workup and mgmt of B-ALL. Plan to start modified PETHEMA (Day 1=7/21).     Plan for today  - Start modified PETHEMA this evening  - Appreciate Hepatology recommendations;   - EGD today noted large esophageal varices with no active bleed. S/p banding x2 for prophylaxis. No complications   - FLD today, resume regular diet on 7/22   - F/u EGD in 6 weeks  - Sinus tachy 130s-140bpm, unresponsive to fluids. No evidence of sepsis or bleed, and no significant cardiac history. Ordered BCx. Could be secondary to leukemia and steroids. Ordered (soft) telemetry, ok to stay on 7D at this time   - Lactulose 20mg started 7/20 for overnight confusion. She had 5 BMs overnight, hold lactulose today. Plan to resume lactulose at a lower dose (10mg) on 7/22, hold if >3 BMs per day  - She is developing DIC without e/o bleed, fibrinogen trending down. Start Vit K 10mg po x3 days (7/21-7/23). 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  Ultimately found to have progressive pancytopenia compared to prior, with worsening anemia and leukopenia. She had background leukopenia and thrombocytopenia, likely related to chronic liver disease. 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 FISH processed at [facility]- Ph negative, BCR/ABL1 rearrangement absent. Near-triploid karyotype which is a poor prognostic indicator.   - PICC placed 7/18  - Pre-phase with steroids; Dexamethasone 20mg q12h 7/18-7/19. Decreased to 20mg once daily 7/20 and 7/21. Discontinued, will follow steroid regimen per modified PETHEMA protocol as below   - Repeat peripheral flow 7/19 - in process  - Continue allopurinol 300mg daily. Follow TLS/DIC labs once daily  - Given her underlying cirrhosis and age she is unlikely to be a transplant candidate, and would not likely be able to tolerate an intensive regimen (especially one with asparaginase). Will start modified PETHEMA, 25% dose reduced for liver disease (TBili 1.5). If PETHEMA is poorly tolerated and/or liver function worsens significantly, will consider switching to blinatumomab   - s/p LP w/ IT Methotrexate 7/19 - flow in process. Per the chemo protocol as below she would receive triple therapy IT chemo on Days 1 and 28. 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 Increased to 1334mg TID x7/21. Follow Phos daily   - Additional correction of electrolyte abnormalities (K+, Ca++) PRN per usual means.  - She is developing DIC without e/o bleed, fibrinogen trending down (155 on 7/21). Start Vit K 10mg po x3 days (7/21-7/23)   - Follow coagulation labs daily, conditional orders for cryo if fibrinogen <100    GI  #HCV w/ compensated cirrhosis  #HBV exposed, not active infection  #Hyperbilirubinemia  #Mild transaminitis  Patient with known HCV infection, and previous HBV exposure (surface Ab positive, core Ab positive, surface Ag negative). Imaging and labs with mild coagulopathy, splenomegaly and thrombocytopenia prior to ALL diagnosis all consistent with cirrhosis/advanced fibrosis. Plan was for repeat imaging and AFP Q6 months. Recently started treatment with sofosbuvir/velpatasvir for HCV infection, although at [place] this was on hold, reportedly per Dr. Leventhal. There is no particular interaction between sofosbuvir/velpatasvir and chemo, although absorption may be reduced with PPI (which she should be on while on Dex).  - Hepatology consulted 7/19. Appreciate recommendations;   - Abdominal ultrasound with doppler 7/20- see detailed report   - Resume sofosbuvir-velpatasvir (epclusa) 7/19- continue through chemotherapy   - Recommend starting lamivudine along with chemotherapy as HBV ppx, this has a higher rate of resistance but is favored over tenofovir and entacavir whennot needed long term (<1 year). Started lamivudine 100mg daily 7/20, continue through chemotherapy  - Follow CMP and coags    # Esophageal Varices, non-bleeding. S/p banding x2 on 7/21/21  In preparation for chemotherapy and history as above, patient underwent EGD with Hepatology on 7/21. During the EGD she was noted to have large esophageal varices with no active bleed. S/p banding x2 for prophylaxis. No complications  - FLD 7/21, resume regular diet on 7/22  - F/u EGD in 6 weeks, ~ 8/1/21 (not yet  arranged)    ID   # Concern for community-acquired pneumonia  # Lactic acidosis  CT Chest at OSH 7/13/21 with small areas of ill-defined nodular opacities at the RUL and RML, probably post infectious/inflammatory in etiology. She has been afebrile on admission and denies cough, but has required 1L intermittently and endorses dyspnea on exertion. Previously not on O2.   - Levaquin 750mg once daily, continue for 7 days total (7/19-7/25)  - Lactic acid 2-3.5. No other infectious symptoms, low suspicion for sepsis. Likely due to underlying leukemia and liver disease    # ID PPX  - ACV 400mg BID (HSV 1+/2+)  - Levaquin increased to 750mg daily due to possible pneumonia, see above  - IV Micafungin 50mg daily. After completion of chemotherapy will de-escalate to ppx fluconazole if ANC <1000  - Nebulized pentamidine for PJP ppx given 7/19/21. Continue monthly, next pentamidine dose due ~ 8/19/21    PULM  # Dyspnea on exertion  # Mild to moderate left pleural effusion  # Atelectasis  # Hypoxia  CT PE at OSH on 7/13 with mild to moderate left basilar pleural effusion and compressive atelectasis. Also with possible inflammation/infection of the RUL and RML. She has remained afebrile. Was started on 1L oxygen at OSH, continued on admission. She endorses dyspnea on exertion, no cough. She is a nonsmoker, no history of lung disease. Etiology of pleural effusion is unclear, could be related to infection vs related to her cirrhosis which would be less likely.   - Antibiotics as above.   - Caution with IVF  - Supplemental O2 to maintain sats 90-95%. Wean as able. Most recently 92-93% on room air with minimal activity as tolerated  - Incentive spirometry encouraged  - If she requires O2 closer to discharge will need further testing and a home O2 order    CV  # Sinus tachycardia  Baseline heart rate unclear per chart review. HR up to 140s. EKG with sinus tachycardia. No chest pain or dyspnea at rest. Could be multifactorial in the  setting of leukemia, steroids, and borderline hypovolemia secondary to poor PO fluid intake. Echo 7/17/21 - EF 55-60%, otherwise normal.   - Decrease pre-phase steroids to 20mg once daily 7/20  - 500cc NS bolus given on 7/19 and 7/20. Hold continuous fluids and limit IVF boluses due to known pleural effusion and risk for pulmonary edema  - (7/21) Patient continues to have sinus tachy 130s-140bpm. Unresponsive to fluids. No evidence of sepsis or bleed. Ordered BCx. Ordered (soft) telemetry, ok to stay on 7D at this time    # HTN. Continue amlodipine 5mg daily  # Aortic sclerosis/insufficiency. Moderate AI seen on recent TTE. Continue to monitor    PSYCH  # Disorientation, restlessness, forgetfulness  Patient has been increasingly restless and disoriented overnight 7/18, and 7/19. She is forgetful and sometimes nonsensical. No aphasia. Symptoms generally resolve by mid-morning. No other neurodeficits.   - Could be medication related, she has been receiving dex 20mg BID. Decreased to once daily qam on 7/20.   - Query if related to her liver disease. No asterixis. Per pt report she is having <1BM per day. Per Hepatology, an ammonia level would not be helpful in this setting as it is a clinical diagnosis, but would recommend starting lactulose 20mg daily x7/20, hold or decrease to 10mg daily if >3 BMs/day.    - She had 5 BMs overnight with lactulose, hold lactulose x7/21. Plan to resume lactulose at a lower dose (10mg) on 7/22, hold if >3 BMs per day  - Slightly improved overnight 7/20    # Acute on Chronic Insomnia  She has history of chronic insomnia, acutely worse in the setting of steroids and acute illness. Pt reports that melatonin doesn't help.  - Trazodone 50mg at bedtime prn     FEN/Lytes  #B12 deficiency  #Low-normal folate  Noted to have B12 of 127 with folate of 4 on admission at [place]. Will plan to continue with parenteral B12. Will hold off on folate replacement in the setting of IT methotraxate.             "   - Started oral B12 (per pt preference) 1000mcg daily x 7 days (7/18-7/24), weekly x 1 month, monthly thereafter              - No folate replacement    # Severe malnutrition in the context of acute on chronic illness  - RDAT  - Supplements ordered  - Follow weights. Consider calorie counts after procedures    FEN:  - Encouraged PO intake. Bolus fluids PRN  - PRN lyte replacement  - RDAT     Prophy/Misc:  - VTE: SCDs only. Held for procedures and most recently with DIC  - GI/PUD: PPI while on steroids  - Bowels: Senna and Miralax PRN  - Activity: As tolerated. PT/OT consulted  - Lines: LUE PICC x7/18    Consults: Hepatology, PT, CAPS team  CODE: Full code  Disposition: Admit to hospital for mgmt of new B-ALL. Will be inpatient for 4-6 weeks  Follow up: Patient has established care with Dr. Washington. Will arrange follow up closer to known discharge date.     Patient was seen and plan of care was discussed with attending physician Dr. Padmini Rosenbaum PA-C  Hematology/Oncology  Pager # [phone]  ___________________________________________________________________    Subjective & Interval History:    She was disoriented overnight but per her  and patient she wasn't quite as restless and \"ornery\". This morning she was alert and oriented x4 but a little bit forgetful. 5 loose stools overnight. Tolerated the EGD well this morning. She endorses dyspnea on exertion, remains stable on room air. Her nose feels dry, is going to try nasal saline spray. She has been walking to the restroom and back and up to the chair. Taking sponge baths. Overall activity is pretty minimal. She feels tired often. No cough. Appetite is OK. No dark tarry stools or blood in stool. A comprehensive review of systems was reviewed with the patient and the pertinent positives are listed in the HPI above.  Discussed the plan with the patient today, she is agreeable to starting chemotherapy. Supportive  at bedside. She was " "encouraged to do more activity.     Physical Exam:    Blood pressure 125/59, pulse (!) 138, temperature 96.8  F (36  C), temperature source Oral, resp. rate 20, height 1.626 m (5' 4\"), weight 65.7 kg (144 lb 12.8 oz), SpO2 94 %.    General: alert and cooperative thin female, lying in bed, no acute distress. Occasional word-finding difficulties but no aphasia   HEENT: sclera anicteric, EOMI, MMM  Neck: supple, normal ROM  CV: Tachycardic, regular rhythm with rare aberrant rhythm (PAC), normal S1/S2, no m/r/g  Resp: Diminished in left base, no wheezing/crackles, normal respiratory effort on ambient air  GI: slightly firm, non-tender, non-distended, bowel sounds present and normoactive  MSK: warm and well-perfused, no edema. Decreased tone  Skin: no rashes on limited exam, no jaundice  Neuro: AOx3, moves all extremities equally, no focal deficits. Essential tremor noted in her hands. No asterixis  LUE PICC C/D/I without erythema    Labs & Studies: I personally reviewed the following studies:  ROUTINE LABS (Last four results):  CMP  Recent Labs   Lab 07/21/21  0539 07/20/21  1710 07/20/21  0424 07/19/21  0551 07/18/21  1818 07/17/21  2037     --  137 136 136 137   POTASSIUM 4.3  --  4.2 4.0  4.0 4.2 3.4   CHLORIDE 107  --  105 104 104 105   CO2 23  --  21 22 24 25   ANIONGAP 8  --  11 10 8 7   *  --  163* 151* 149* 117*   BUN 34*  --  26 15 10 8   CR 0.68  --  0.62 0.50* 0.52 0.49*   GFRESTIMATED 86  --  88 >90 >90 >90   CEASAR 7.5*  --  7.6* 7.9* 8.2* 8.1*   MAG 2.9*  --  2.3 2.1 1.8 1.8   PHOS 5.9* 5.4* 5.8* 4.7* 4.2 2.9   PROTTOTAL 5.0*  --  5.4*  --   --  5.4*   ALBUMIN 2.3*  --  2.4*  --   --  2.4*   BILITOTAL 1.5*  --  1.4*  --   --  1.5*   ALKPHOS 63  --  52  --   --  55   AST 74*  --  63*  --   --  52*   ALT 25  --  17  --   --  13     CBC  Recent Labs   Lab 07/21/21  0641 07/21/21  0539 07/20/21  0424 07/19/21  0551 07/18/21  1818   WBC 1.3* 0.9* 1.1* 1.5* 0.9*   RBC 2.57*  --  2.26* 2.32* 2.42* "   HGB 8.2* 7.2* 7.1* 7.4* 7.8*   HCT 25.9* 23.4* 22.6* 23.2* 23.8*   * 105* 100 100 98   MCH 31.9 32.1 31.4 31.9 32.2   MCHC 31.7 30.8* 31.4* 31.9 32.8   RDW 22.7* 22.5* 22.6* 22.5* 22.4*   PLT 85* 52* 80* 105* 82*     INR  Recent Labs   Lab 07/21/21  0539 07/20/21  0424 07/19/21  0551 07/18/21  1818   INR 1.61* 1.46* 1.21* 1.14       Microbiology  Recent Labs   Lab 07/21/21  0641 07/20/21  0424 07/19/21  0551 07/19/21  0254   LACT 3.6* 3.4* 3.3* 2.4*       Pertinent Imaging    Medications list for reference:  Current Facility-Administered Medications   Medication     acetaminophen (TYLENOL) tablet 650 mg     acyclovir (ZOVIRAX) tablet 400 mg     albuterol (PROVENTIL) neb solution 2.5 mg    And     pentamidine (NEBUPENT) neb solution 300 mg     allopurinol (ZYLOPRIM) tablet 300 mg     amLODIPine (NORVASC) tablet 5 mg     calcium acetate (PHOSLO) capsule 1,334 mg     cyanocobalamin (VITAMIN B-12) tablet 1,000 mcg     [START ON 7/25/2021] cyanocobalamin (VITAMIN B-12) tablet 1,000 mcg     dexamethasone (DECADRON) tablet 20 mg     heparin lock flush 10 UNIT/ML injection 5-20 mL     heparin lock flush 10 UNIT/ML injection 5-20 mL     [START ON 7/22/2021] lactulose (CHRONULAC) solution 10 g     lamiVUDine (EPIVIR) tablet 100 mg     levofloxacin (LEVAQUIN) tablet 750 mg     LORazepam (ATIVAN) tablet 0.5-1 mg    Or     LORazepam (ATIVAN) injection 0.5-1 mg     melatonin tablet 10 mg     micafungin (MYCAMINE) 50 mg in sodium chloride 0.9 % 100 mL intermittent infusion     No rectal suppositories if WBC less than 1000/microliters or platelets less than 50,000/ L     ondansetron (ZOFRAN) injection 8 mg    Or     ondansetron (ZOFRAN-ODT) ODT tab 8 mg    Or     ondansetron (ZOFRAN) tablet 8 mg     pantoprazole (PROTONIX) EC tablet 40 mg     phytonadione (MEPHYTON/VITAMIN K) 1 MG/ML oral solution 10 mg     polyethylene glycol (MIRALAX) Packet 17 g     prochlorperazine (COMPAZINE) tablet 5 mg    Or     prochlorperazine  (COMPAZINE) injection 5 mg     senna-docusate (SENOKOT-S/PERICOLACE) 8.6-50 MG per tablet 1 tablet    Or     senna-docusate (SENOKOT-S/PERICOLACE) 8.6-50 MG per tablet 2 tablet     sodium chloride (OCEAN) 0.65 % nasal spray 1 spray     sodium chloride (PF) 0.9% PF flush 10-20 mL     sodium chloride (PF) 0.9% PF flush 10-20 mL     sodium chloride (PF) 0.9% PF flush 10-40 mL     sodium chloride (PF) 0.9% PF flush 10-40 mL     sofosbuvir-velpatasvir (EPCLUSA) per tablet 1 tablet     traZODone (DESYREL) tablet 50 mg

## 2021-07-21 NOTE — PROVIDER NOTIFICATION
DATE/TIME  (DOT-TD, DOT-NOW) CHEMO CHECK ACTIVITY (REGIMEN & DOSE CHECK, DAY, DOSE #, NAME OF CHEMO #1)  CHEMO DRUG #2  CHEMO DRUG #3 NAME OF RN #1 (USE DOT-ME HERE) NAME OF RN#2 (2ND RN TO LOG IN SEPARATELY)   7/21/2021  5:34 PM   ALL PETHEMA induction therapy protocol check   SAI Fernando RN     07/21/21  8:22 PM   Vincristine, double check Daunorubicin, double check  SAI Garay RN

## 2021-07-21 NOTE — PROGRESS NOTES
Call back received from MD. No changes with lactic acid due to diagnosis. Will order IV fluid bolus of 500 ml. Will continue to monitor.

## 2021-07-22 NOTE — PLAN OF CARE
[id number]    No acute event, Afebrile, VSS.       NEURO: Alert and oriented x4.      RESPIRATORY: Room Air, denies dizziness.    CARDIO: VSS, denies dizziness, and extremity pain.      GI/:  Denies N/V, BS active, BM x 1, AUOP.       SKIN: Intact.      ACTIVITY: Assist x1, to bedside commode.      PAIN: Denies pain.    DLA: PICC double lumen, RED cup changed, and heparin locked.     BG: No     PLAN: Continue monitoring.

## 2021-07-22 NOTE — PROVIDER NOTIFICATION
MD paged regardin/22 1600- Charge RN called from hepatology specialty clinic.  Epclusa efficacy reduced by pantoprazole, specialty hepatology pharmacist indicated should switch to omeprazole 20mg daily.  Should take epclusa in AM with food, omeprazole to be taken 4hrs after, not any later or earlier.  Per pharmacist Jasmyn [phone]

## 2021-07-22 NOTE — PROGRESS NOTES
Per [facility] Pharmacist Jasmyn in Hepatitis C department, it is recommended that patient discontinue pantoprazole for the duration of Epclusa due to drug interactions. If patient requires a PPI, a suggested alternative is omeprazole 20 mg daily, taken exactly 4 hours AFTER administration of Epclusa. For further questions, please call Jasmyn at [phone]. This information was relayed to the charge nurse on 7D.

## 2021-07-22 NOTE — PLAN OF CARE
-Elevated phos level 5.1. Continue with Phoslo TID and monitor for TLS    -Pt tolerating regular diet well today.     -Pt not making much stool so lactulose dose increase. Continue to monitor stool output closely.     -Bilateral ankles and feet are +2-3 swollen. Gloria BIANCHI (HUSSEIN) notified and no lymphaedema wrap  or lasix at this time. Encourage pt to walk to help get rid of fluids.     -Continue with Telemetry monitoring. Sinus tachy 's and occiasional 130's.     -Pt worked with OT therapy today and got in the shower with assist X1.     Addendnum @ 14:54  -Lymphedema consult order  -No bowel movement on day shift. Pt voided X 1 in the commode. Pt only able to tolerate lactulose 10gm. She does not like the flavor of the medication. Goal 3 BM's/day

## 2021-07-22 NOTE — PROGRESS NOTES
"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    Medications  Current Facility-Administered Medications   Medication Dose Route Frequency    acyclovir  400 mg Oral BID    albuterol  2.5 mg Nebulization Q28 Days    And    pentamidine  300 mg Inhalation Q28 Days    allopurinol  300 mg Oral Daily    amLODIPine  5 mg Oral Daily    calcium acetate  1,334 mg Oral TID w/meals    cyanocobalamin  1,000 mcg Oral Daily    [START ON 7/25/2021] cyanocobalamin  1,000 mcg Oral Weekly    [START ON 7/27/2021] INTRATHECAL - Cytarabine and/or methotrexate and/or Hydrocortisone   Intrathecal Once    [START ON 8/17/2021] INTRATHECAL - Cytarabine and/or methotrexate and/or Hydrocortisone   Intrathecal Once    DAUNOrubicin  22.5 mg/m2 (Treatment Plan Recorded) Intravenous Q7 Days    heparin lock flush  5-20 mL Intracatheter Q24H    lactulose  10 g Oral Daily    lamiVUDine  100 mg Oral Daily    levofloxacin  750 mg Oral Daily    micafungin  50 mg Intravenous Q24H    ondansetron  16 mg Oral Q7 Days    pantoprazole  40 mg Oral QPM    phytonadione  10 mg Oral Daily    [START ON 8/17/2021] zz extemporaneous template  15 mg/m2 (Treatment Plan Recorded) Oral BID    predniSONE  30 mg/m2 (Treatment Plan Recorded) Oral BID    sodium chloride (PF)  10-40 mL Intracatheter Q8H    sodium chloride (PF)  10-40 mL Intracatheter Q8H    sofosbuvir-velpatasvir  1 tablet Oral Daily    vinCRIStine (ONCOVIN) infusion  2 mg Intravenous Q7 Days       Review of systems  A 10-point review of systems was negative.    Examination  /60 (BP Location: Right arm)   Pulse (!) 124   Temp 96.9  F (36.1  C) (Oral)   Resp 20   Ht 1.626 m (5' 4\")   Wt 65.7 kg (144 lb 12.8 oz)   SpO2 93%   BMI 24.85 kg/m  "     Intake/Output Summary (Last 24 hours) at 7/22/2021 0750  Last data filed at 7/22/2021 0615  Gross per 24 hour   Intake 270 ml   Output 1075 ml   Net -805 ml       Constitutional: cooperative, pleasant  Eyes: Sclera anicteric  Ears/nose/mouth/throat: mucus membranes moist  Neck: supple  CV: 1+ edema  Respiratory: Unlabored breathing  Abd: Nondistended, +bs, nontender, no peritoneal signs  Skin: warm, perfused, no jaundice  Neuro: AAO x 3, mild asterixis    Laboratory  Lab Results   Component Value Date     07/22/2021     06/03/2021    POTASSIUM 4.3 07/22/2021    POTASSIUM 3.5 06/03/2021    CHLORIDE 107 07/22/2021    CHLORIDE 105 06/03/2021    CO2 21 07/22/2021    CO2 23 06/03/2021    BUN 37 07/22/2021    BUN 11 06/03/2021    CR 0.76 07/22/2021    CR 0.55 06/03/2021       Lab Results   Component Value Date    BILITOTAL 2.3 07/22/2021    BILITOTAL 1.9 06/03/2021    ALT 33 07/22/2021    ALT 14 06/03/2021    AST 89 07/22/2021    AST 55 06/03/2021    ALKPHOS 65 07/22/2021    ALKPHOS 51 06/03/2021       Lab Results   Component Value Date    WBC 1.0 07/22/2021    WBC 1.3 06/03/2021    HGB 7.7 07/22/2021    HGB 9.2 06/03/2021     07/22/2021    MCV 90 06/03/2021    PLT 74 07/22/2021    PLT 76 06/03/2021       Lab Results   Component Value Date    INR 1.53 07/22/2021    INR 1.16 06/03/2021       MELD-Na score: 14 at 7/22/2021  6:19 AM  MELD score: 14 at 7/22/2021  6:19 AM  Calculated from:  Serum Creatinine: 0.76 mg/dL (Using min of 1 mg/dL) at 7/22/2021  6:19 AM  Serum Sodium: 137 mmol/L at 7/22/2021  6:19 AM  Total Bilirubin: 2.3 mg/dL at 7/22/2021  6:19 AM  INR(ratio): 1.53 at 7/22/2021  6:19 AM  Age: 76 years    Radiology  US ABD 7/20/21:  Impression:   1.  The liver is of diffusely increased echotexture and nodular  contour consistent with cirrhosis.  2.  The portal venous system demonstrate low velocity and reduced  phasicity and the hepatic arterial system has elevated resistive  indices which  indicates portal hypertension.  3.  Splenomegaly.  4.  Small ascites.      Assessment  76 year old female with diagnosis of chronic hep C, genotype 1a, hepatitis B past exposure, newly diagnosed B-ALL, hepatology is consulted for management of cirrhosis secondary to chronic hep C.    Patient has evidence of cirrhosis based upon her elastography as well as large esophageal varices upon the endoscopy, banded x2, she has been started on lamivudine for HBV isolated core + , HBV DNA negative. Patient would require completion of her sofosbuvir/velpatasvir till in September.     Recommendations  --Continue HCV treatment for total of 12 weeks. We have contacted outpatient pharmacy given 's questions re upcoming DAA shipment  --Continue lamivudine for HBV ppx in isolated HBV core + patient. If LFTs rise, check HBV dna. She is at low risk for HBV reactivation on lamivudine therapy  --Continue lactulose for goal of 3 BMs per day  --Follow up in liver clinic in 4-6 weeks  --Repeat EGD in 6 weeks    Hepatology will sign off, please call for any questions.    Thank you for involving us in this patient's care. Please do not hesitate to contact the GI service with any questions or concerns.      Pt care plan discussed with Dr. Luo, Hepatology staff physician.    This note was created with voice recognition software, and while reviewed for accuracy, typos may remain.    Julissa Gu MD  [facility] Fellow  Pager: [phone]

## 2021-07-22 NOTE — PROGRESS NOTES
"   07/22/21 1000   Quick Adds   Type of Visit Initial Occupational Therapy Evaluation   Living Environment   People in home spouse   Current Living Arrangements house   Home Accessibility stairs to enter home;stairs within home   Number of Stairs, Main Entrance 3   Number of Stairs, Within Home, Primary greater than 10 stairs   Stair Railings, Within Home, Primary railing on left side (ascending)   Transportation Anticipated family or friend will provide   Living Environment Comments   (\"everything but laundry on main level\", tub shower)   Self-Care   Usual Activity Tolerance good   Current Activity Tolerance fair   Regular Exercise Yes   Activity/Exercise Type walking   Exercise Amount/Frequency daily   Equipment Currently Used at Home none   Activity/Exercise/Self-Care Comment Has not been able to excercise for the past 3 months   Instrumental Activities of Daily Living (IADL)   Previous Responsibilities meal prep;housekeeping;shopping;medication management;driving   IADL Comments Has tub/shower at home, was independent with all IADLs before getting sick three months ago   Disability/Function   Hearing Difficulty or Deaf no   Wear Glasses or Blind yes  (wears glasses)   Concentrating, Remembering or Making Decisions Difficulty no   Difficulty Communicating no   Difficulty Eating/Swallowing no   Walking or Climbing Stairs Difficulty no   Dressing/Bathing Difficulty no   Toileting issues no   Doing Errands Independently Difficulty (such as shopping) yes   Fall history within last six months yes  (per pt maybe 4)   Number of times patient has fallen within last six months 4   Change in Functional Status Since Onset of Current Illness/Injury yes   General Information   Onset of Illness/Injury or Date of Surgery 07/17/21   Referring Physician Gloria Rosenbaum PA-C   Patient/Family Therapy Goal Statement (OT) Return home   Additional Occupational Profile Info/Pertinent History of Current Problem per chart: Heather BALDERAS" Williams Goodwin is a 76 year old female with history of chronic HCV c/b advanced liver fibrosis and splenomegaly, B12 deficiency, and aortic sclerosis with insufficiency who initially presented to [facility] with syncope and dizziness. Was found to have leptomeningeal enhancement on brain MRI and pancytopenia. 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 She was transferred to [facility] for further workup and mgmt of B-ALL.   Existing Precautions/Restrictions fall;immunosuppressed   Limitations/Impairments safety/cognitive   Left Upper Extremity (Weight-bearing Status) full weight-bearing (FWB)   Right Upper Extremity (Weight-bearing Status) full weight-bearing (FWB)   Left Lower Extremity (Weight-bearing Status) full weight-bearing (FWB)   Right Lower Extremity (Weight-bearing Status) full weight-bearing (FWB)   Cognitive Status Examination   Follows Commands delayed response/completion   Visual Perception   Visual Impairment/Limitations WFL   Impact of Vision Impairment on Function (Vision) Wears glasses   Pain Assessment   Patient Currently in Pain No   Posture   Posture not impaired   Range of Motion Comprehensive   General Range of Motion no range of motion deficits identified   Strength Comprehensive (MMT)   General Manual Muscle Testing (MMT) Assessment no strength deficits identified   Coordination   Gross Motor Coordination WFL   Fine Motor Coordination   (Difficulty with fine motor tasks needed for ADLs)   Clinical Impression   Criteria for Skilled Therapeutic Interventions Met (OT) yes;meets criteria;skilled treatment is necessary   OT Diagnosis Impared ability to complete ADLs and IADLs, decresed activity tolerance   OT Problem List-Impairments impacting ADL problems related to;activity tolerance impaired;cognition;strength;balance   Assessment of Occupational Performance 3-5 Performance Deficits   Identified Performance Deficits LE dressing, functional mobility, IADLs, cognition   Planned  Therapy Interventions (OT) ADL retraining;IADL retraining;cognition;fine motor coordination training;progressive activity/exercise   Clinical Decision Making Complexity (OT) moderate complexity   Therapy Frequency (OT) 6x/week   Predicted Duration of Therapy 2 weeks   Anticipated Equipment Needs Upon Discharge (OT) shower chair;walker, standard   Risk & Benefits of therapy have been explained evaluation/treatment results reviewed;care plan/treatment goals reviewed;risks/benefits reviewed;current/potential barriers reviewed;participants voiced agreement with care plan;participants included;patient   OT Discharge Planning    OT Discharge Recommendation (DC Rec) Transitional Care Facility   OT Rationale for DC Rec OT: Pt is well below baseline functioning, would benefit from therapy to maximize independence with ADLs and IADLs   OT Brief overview of current status  CGA-min Ax1 with FWW and gait belt   Total Evaluation Time (Minutes)   Total Evaluation Time (Minutes) 5

## 2021-07-22 NOTE — PROGRESS NOTES
Hematology / Oncology  Daily Progress Note   Date of Service: 07/22/2021  Patient: Heather Aden  MRN: [id number]  Admission Date: 7/17/2021  Hospital Day # 5    Assessment & Plan:   Heather Aden is a 76 year old female with history of chronic HCV c/b advanced liver fibrosis and splenomegaly, B12 deficiency, and aortic sclerosis with insufficiency who initially presented to [facility] with syncope and dizziness. Was found to have leptomeningeal enhancement on brain MRI and pancytopenia. 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 She was transferred to [facility] for further workup and mgmt of B-ALL. Started dose-reduced modified PETHEMA (Day 1=7/21).     Plan for today  - Today is Day 2 modified PETHEMA   - Sinus tachy 130s-140bpm, unresponsive to fluids. No evidence of sepsis or bleed, and no significant cardiac history. BCx NGTD. Could be secondary to leukemia and steroids. Continue med/surg telemetry, ok to stay on 7D at this time   - Lactulose 10-20mg daily, goal 3 BMs/day   - She is developing DIC without e/o bleed, fibrinogen trending down. Vit K 10mg po x3 days (7/21-7/23). 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 with days of fall, and worsening functional status. Ultimately found to have progressive pancytopenia compared to prior, with worsening anemia and leukopenia. She had background leukopenia and thrombocytopenia, likely related to chronic liver disease. 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 FISH processed at [facility] [place]- Ph negative, BCR/ABL1 rearrangement absent. Near-triploid karyotype which is a poor prognostic indicator.   - PICC placed 7/18  - Pre-phase with steroids; Dexamethasone 20mg  q12h 7/18-7/19. Decreased to 20mg once daily 7/20 and 7/21. Discontinued, will follow steroid regimen per modified PETHEMA protocol as below   - Repeat peripheral flow 7/19 - 10% abnormal B-lymphoblasts. CD20 negative  - Continue allopurinol 300mg daily. Follow TLS/DIC labs once daily  - Given her underlying cirrhosis and age she is unlikely to be a transplant candidate, and would not likely be able to tolerate an intensive regimen (especially one with asparaginase). Will start modified PETHEMA per the PETHEMA ALL-96 trial, 25% dose reduced for liver disease (TBili 1.5). If PETHEMA is poorly tolerated and/or liver function worsens significantly, will consider switching to blinatumomab   - s/p LP w/ IT Methotrexate 7/19 - flow negative.   - Per the chemo protocol as below she would receive triple therapy IT chemo on Days 1 and 28. Will cancel day 1 triple therapy IT chemo as she already received IT methotrexate and flow is negative. 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 Increased to 1334mg TID x7/21. Follow Phos daily   - Additional correction of electrolyte abnormalities (K+, Ca++) PRN per usual means.  - Follow TLS labs once daily    # DIC  # Hypofibrinogenemia   - She is developing DIC without e/o bleed, fibrinogen trending down (155 on 7/21). Start Vit K 10mg po x3 days (7/21-7/23)   - Follow coagulation labs daily, conditional orders for cryo if  fibrinogen <100    GI  #HCV w/ compensated cirrhosis  #HBV exposed, not active infection  #Hyperbilirubinemia  #Mild transaminitis  Patient with known HCV infection, and previous HBV exposure (surface Ab positive, core Ab positive, surface Ag negative). Imaging and labs with mild coagulopathy, splenomegaly and thrombocytopenia prior to ALL diagnosis all consistent with cirrhosis/advanced fibrosis. Plan was for repeat imaging and AFP Q6 months. Recently started treatment with sofosbuvir/velpatasvir for HCV infection, although at [facility] this was on hold, reportedly per Dr. Leventhal. There is no particular interaction between sofosbuvir/velpatasvir and chemo, although absorption may be reduced with PPI (which she should be on while on Dex).  - Hepatology consulted 7/19. Appreciate recommendations;   - Abdominal ultrasound with doppler 7/20- see detailed report   - Resume sofosbuvir-velpatasvir (epclusa) 7/19- continue for a total of 12 weeks (ie ~10/19/21)   - Recommend starting lamivudine along with chemotherapy as HBV ppx, this has a higher rate of resistance but is favored over tenofovir and entacavir whennot needed long term (<1 year). Started lamivudine 100mg daily 7/20, continue through chemotherapy  - Follow CMP and coags  - Follow up in liver clinic in 4-6 weeks, not yet arranged  - TBili significantly up-trended s/p chemotherapy (1.5 on 7/21 ? 2.3 on 7/22). Follow closely.     # Esophageal Varices, non-bleeding. S/p banding x2 on 7/21/21  In preparation for chemotherapy and history as above, patient underwent EGD with Hepatology on 7/21. During the EGD she was noted to have large esophageal varices with no active bleed. S/p banding x2 for prophylaxis. No complications  - FLD 7/21, resume regular diet on 7/22  - F/u EGD in 6 weeks, ~ 8/1/21 (outpatient liver team to arrange)    ID   # Concern for community-acquired pneumonia  # Lactic acidosis  CT Chest at OSH 7/13/21 with small areas of ill-defined nodular  opacities at the RUL and RML, probably post infectious/inflammatory in etiology. She has been afebrile on admission and denies cough, but has required 1L intermittently and endorses dyspnea on exertion. Previously not on O2.   - Levaquin 750mg once daily, continue for 7 days total (7/19-7/25)  - Lactic acid 2-3.5. No other infectious symptoms, low suspicion for sepsis. Likely due to underlying leukemia and liver disease    # ID PPX  - ACV 400mg BID (HSV 1+/2+)  - Levaquin increased to 750mg daily due to possible pneumonia, see above  - IV Micafungin 50mg daily. After completion of chemotherapy will de-escalate to ppx fluconazole if ANC <1000  - Nebulized pentamidine for PJP ppx given 7/19/21. Continue monthly, next pentamidine dose due ~ 8/19/21    PULM  # Dyspnea on exertion  # Mild to moderate left pleural effusion  # Atelectasis  # Hypoxia  CT PE at OSH on 7/13 with mild to moderate left basilar pleural effusion and compressive atelectasis. Also with possible inflammation/infection of the RUL and RML. She has remained afebrile. Was started on 1L oxygen at OSH, continued on admission. She endorses dyspnea on exertion, no cough. She is a nonsmoker, no history of lung disease. Etiology of pleural effusion is unclear, could be related to infection vs related to her cirrhosis which would be less likely.   - Antibiotics as above.   - Caution with IVF  - Incentive spirometry encouraged  - Most recently on room air with rest and limited activity    CV  # Sinus tachycardia  Baseline heart rate unclear per chart review. HR up to 140s. EKG with sinus tachycardia. No chest pain or dyspnea at rest. Could be multifactorial in the setting of leukemia, steroids, and borderline hypovolemia secondary to poor PO fluid intake. Echo 7/17/21 - EF 55-60%, otherwise normal.   - Decrease pre-phase steroids to 20mg once daily 7/20  - 500cc NS bolus given on 7/19 and 7/20. Hold continuous fluids and limit IVF boluses due to known pleural  effusion and risk for pulmonary edema  - (7/21) Patient continues to have sinus tachy 130s-140bpm. Unresponsive to fluids. No evidence of sepsis or bleed. BCx NGTD. Ordered med/surg telemetry, ok to stay on 7D at this time    # LE edema  Patient receiving intermittent IVF, last given 500cc on 7/21AM. Now with 1+ LE edema x7/22. Her weight is stable and she is urinating well, not all intake and output is being reported. Pt is a poor historian, unclear how much fluids she is drinking but suspect it is sub-par. No dyspnea noted.   - Hold lasix at this time and limit IVF   - Will continue to monitor     # HTN. Continue amlodipine 5mg daily  # Aortic sclerosis/insufficiency. Moderate AI seen on recent TTE. Continue to monitor      PSYCH  # Disorientation, restlessness, forgetfulness  Patient has been increasingly restless and disoriented overnight 7/18, and 7/19. She is forgetful and sometimes nonsensical. No aphasia. Symptoms generally resolve by mid-morning. No other neurodeficits.   - Could be medication related, she has been receiving dex 20mg BID. Decreased to once daily qam on 7/20.   - Query if related to her liver disease. No asterixis. Per pt report she is having <1BM per day. Per Hepatology, an ammonia level would not be helpful in this setting as it is a clinical diagnosis, but would recommend starting lactulose 20mg daily x7/20, hold or decrease to 10mg daily if >3 BMs/day. Goal is to have 3BMs per day.   - Slightly improved overnight 7/20    # Acute on Chronic Insomnia  She has history of chronic insomnia, acutely worse in the setting of steroids and acute illness. Pt reports that melatonin doesn't help.  - Trazodone 50mg at bedtime prn     FEN/Lytes  #B12 deficiency  #Low-normal folate  Noted to have B12 of 127 with folate of 4 on admission at [facility]. Will plan to continue with parenteral B12. Will hold off on folate replacement in the setting of IT methotraxate.               - Started oral B12 (per pt  "preference) 1000mcg daily x 7 days (7/18-7/24), weekly x 1 month, monthly thereafter              - No folate replacement    # Severe malnutrition in the context of acute on chronic illness  - RDAT  - Supplements ordered  - Follow weights. Consider calorie counts after procedures    FEN:  - Encouraged PO intake. Bolus fluids PRN  - PRN lyte replacement  - RDAT     Prophy/Misc:  - VTE: SCDs only. Held for DIC, moderate thrombocytopenia, and high risk for bleed with liver disease and known varices  - GI/PUD: PPI while on steroids  - Bowels: Senna and Miralax PRN  - Activity:    # Deconditioning- Pt severely deconditioned. Encouraged acitivity as tolerated. PT/OT consulted  - Lines: LUE PICC x7/18    Consults: Hepatology (signed off), PT/OT, CAPS team  CODE: Full code  Disposition: Admit to hospital for mgmt of new B-ALL. Will be inpatient for 4-6 weeks  Follow up: Patient has established care with Dr. Washington. Will arrange follow up closer to known discharge date.     Patient was seen and plan of care was discussed with attending physician Dr. Padmini Rosenbaum PA-C  Hematology/Oncology  Pager # [phone]  ___________________________________________________________________    Subjective & Interval History:    Started modified PETHEMA yesterday. Tolerated well. Feeling very exhausted today. Slept well overnight. Forgetful but less \"ornery\" and confused. This morning she was alert and oriented x4 but forgot that we told he she was to get chemo yesterday evening, her  reminded her that they were told about this. Per nursing only had a few bowel smears yesterday. Remains stable on room air at rest. Admits to not moving around much yesterday, did sit up in the chair a few times. Endorses nasal congestion, using nasal saline spray. No cough or sore throat. Taking sponge baths. Appetite is pretty poor, eating about 1/2 of her meals. No N/V or abdominal pain. No dark tarry stools or blood in stool. A " "comprehensive review of systems was reviewed with the patient and the pertinent positives are listed in the HPI above.  Discussed the plan with the patient today and reviewed the chemo regimen. Supportive  at bedside. She was strongly encouraged to do more activity.     Physical Exam:    Blood pressure 128/48, pulse 108, temperature (!) 95.7  F (35.4  C), temperature source Oral, resp. rate 20, height 1.626 m (5' 4\"), weight 64.2 kg (141 lb 9.6 oz), SpO2 94 %.    General: alert and cooperative thin female, lying in bed, no acute distress. Occasional word-finding difficulties but no aphasia   HEENT: sclera anicteric, EOMI, MMM  Neck: supple, normal ROM  CV: Tachycardic, regular rhythm with rare aberrant rhythm (PAC), normal S1/S2, no m/r/g  Resp: Diminished in left base, no wheezing/crackles, normal respiratory effort on ambient air  GI: slightly firm, non-tender, non-distended, bowel sounds present and normoactive  MSK: warm and well-perfused, no edema. Decreased tone. 1+ LE edema bilaterally  Skin: no rashes on limited exam, no jaundice  Neuro: AOx3, moves all extremities equally, no focal deficits. Essential tremor noted in her hands. No asterixis  LUE PICC C/D/I without erythema    Labs & Studies: I personally reviewed the following studies:  ROUTINE LABS (Last four results):  CMP  Recent Labs   Lab 07/22/21  0619 07/21/21  1342 07/21/21  0539 07/20/21  1710 07/20/21  0424 07/19/21  0551 07/17/21  2037     --  138  --  137 136 137   POTASSIUM 4.3  --  4.3  --  4.2 4.0  4.0 3.4   CHLORIDE 107  --  107  --  105 104 105   CO2 21  --  23  --  21 22 25   ANIONGAP 9  --  8  --  11 10 7   *  --  191*  --  163* 151* 117*   BUN 37*  --  34*  --  26 15 8   CR 0.76  --  0.68  --  0.62 0.50* 0.49*   GFRESTIMATED 77  --  86  --  88 >90 >90   CEASAR 7.7*  --  7.5*  --  7.6* 7.9* 8.1*   MAG 2.8*  --  2.9*  --  2.3 2.1 1.8   PHOS 5.1* 4.8* 5.9* 5.4* 5.8* 4.7* 2.9   PROTTOTAL 5.1*  --  5.0*  --  5.4*  --  5.4* "   ALBUMIN 2.5*  --  2.3*  --  2.4*  --  2.4*   BILITOTAL 2.3*  --  1.5*  --  1.4*  --  1.5*   ALKPHOS 65  --  63  --  52  --  55   AST 89*  --  74*  --  63*  --  52*   ALT 33  --  25  --  17  --  13     CBC  Recent Labs   Lab 07/22/21  0619 07/21/21  0641 07/21/21  0539 07/20/21  0424 07/19/21  0551   WBC 1.0* 1.3* 0.9* 1.1* 1.5*   RBC 2.42* 2.57*  --  2.26* 2.32*   HGB 7.7* 8.2* 7.2* 7.1* 7.4*   HCT 24.2* 25.9* 23.4* 22.6* 23.2*    101* 105* 100 100   MCH 31.8 31.9 32.1 31.4 31.9   MCHC 31.8 31.7 30.8* 31.4* 31.9   RDW 21.7* 22.7* 22.5* 22.6* 22.5*   PLT 74* 85* 52* 80* 105*     INR  Recent Labs   Lab 07/22/21  0619 07/21/21  0539 07/20/21  0424 07/19/21  0551   INR 1.53* 1.61* 1.46* 1.21*       Microbiology  Recent Labs   Lab 07/21/21  0641 07/20/21  0424 07/19/21  0551 07/19/21  0254   LACT 3.6* 3.4* 3.3* 2.4*       Pertinent Imaging    Medications list for reference:  Current Facility-Administered Medications   Medication     0.9% sodium chloride BOLUS     acetaminophen (TYLENOL) tablet 650 mg     acyclovir (ZOVIRAX) tablet 400 mg     albuterol (PROAIR HFA/PROVENTIL HFA/VENTOLIN HFA) 108 (90 Base) MCG/ACT inhaler 1-2 puff     albuterol (PROVENTIL) neb solution 2.5 mg     albuterol (PROVENTIL) neb solution 2.5 mg    And     pentamidine (NEBUPENT) neb solution 300 mg     allopurinol (ZYLOPRIM) tablet 300 mg     amLODIPine (NORVASC) tablet 5 mg     calcium acetate (PHOSLO) capsule 1,334 mg     cyanocobalamin (VITAMIN B-12) tablet 1,000 mcg     [START ON 7/25/2021] cyanocobalamin (VITAMIN B-12) tablet 1,000 mcg     [START ON 7/27/2021] cytarabine (PF) (CYTOSAR) 30 mg, hydrocortisone sodium succinate PF (solu-CORTEF) 20 mg, methotrexate (PF) 12 mg in sodium chloride (PF) 0.9% PF 6 mL intrathecal inj (PF)     [START ON 8/17/2021] cytarabine (PF) (CYTOSAR) 30 mg, hydrocortisone sodium succinate PF (solu-CORTEF) 20 mg, methotrexate (PF) 12 mg in sodium chloride (PF) 0.9% PF 6 mL intrathecal inj (PF)      DAUNOrubicin HCl (CERUBIDINE) 37 mg in sodium chloride 0.9 % 62 mL infusion     diphenhydrAMINE (BENADRYL) injection 50 mg     EPINEPHrine PF (ADRENALIN) injection 0.3 mg     famotidine (PEPCID) infusion 20 mg     heparin lock flush 10 UNIT/ML injection 5-20 mL     heparin lock flush 10 UNIT/ML injection 5-20 mL     [START ON 7/23/2021] lactulose (CHRONULAC) solution 10-20 g     lamiVUDine (EPIVIR) tablet 100 mg     levofloxacin (LEVAQUIN) tablet 750 mg     LORazepam (ATIVAN) tablet 0.5-1 mg    Or     LORazepam (ATIVAN) injection 0.5-1 mg     melatonin tablet 10 mg     meperidine (DEMEROL) injection 25 mg     methylPREDNISolone sodium succinate (solu-MEDROL) injection 125 mg     micafungin (MYCAMINE) 50 mg in sodium chloride 0.9 % 100 mL intermittent infusion     naloxone (NARCAN) injection 0.2 mg     No rectal suppositories if WBC less than 1000/microliters or platelets less than 50,000/ L     ondansetron (ZOFRAN) injection 8 mg    Or     ondansetron (ZOFRAN-ODT) ODT tab 8 mg    Or     ondansetron (ZOFRAN) tablet 8 mg     ondansetron (ZOFRAN) tablet 16 mg     pantoprazole (PROTONIX) EC tablet 40 mg     phytonadione (MEPHYTON/VITAMIN K) 1 MG/ML oral solution 10 mg     polyethylene glycol (MIRALAX) Packet 17 g     [START ON 8/17/2021] predniSONE (DELTASONE) 25 mg     predniSONE (DELTASONE) tablet 50 mg     prochlorperazine (COMPAZINE) tablet 5 mg    Or     prochlorperazine (COMPAZINE) injection 5 mg     senna-docusate (SENOKOT-S/PERICOLACE) 8.6-50 MG per tablet 1 tablet    Or     senna-docusate (SENOKOT-S/PERICOLACE) 8.6-50 MG per tablet 2 tablet     sodium chloride (OCEAN) 0.65 % nasal spray 1 spray     sodium chloride (PF) 0.9% PF flush 10-20 mL     sodium chloride (PF) 0.9% PF flush 10-20 mL     sodium chloride (PF) 0.9% PF flush 10-40 mL     sodium chloride (PF) 0.9% PF flush 10-40 mL     sofosbuvir-velpatasvir (EPCLUSA) per tablet 1 tablet     traZODone (DESYREL) tablet 50 mg     vinCRIStine (ONCOVIN) 2 mg in  sodium chloride 0.9 % 30 mL infusion

## 2021-07-22 NOTE — PROGRESS NOTES
Nursing Focus: Chemotherapy  D: Positive blood return via PICC. Insertion site is clean/dry/intact, dressing intact with no complaints of pain.  Urine output is recorded in intake in Doc Flowsheet.    I: Premedications given per order (see electronic medical administration record). Dose #1 of vincristine infused over 15 minutes. And Dose #1 of Daunorubicin infused over 15 minutes. Reviewed pt teaching on chemotherapy side effects.  Pt denies need for further teaching. Chemotherapy double checked per protocol by two chemotherapy competent RN's.   A: Tolerating procedure well. Denies nausea and or pain.   P: Continue to monitor urine output and symptoms of nausea. Screen for symptoms of toxicity.

## 2021-07-22 NOTE — PLAN OF CARE
00:00-07:00 am  AF and continue with sinus tachy in mid 120's, asymptomatic.   OVSS  Continue on telemetry  Slight IYP but denied chest pain or SOB  Pt A&Ox 4 but can be forgetful intermittently   Day 1 Vincristine and Daunorubicin given yesterday per modified PETHEMA treatment plan  Tolerating chemo well thus far  No nausea/vomiting  Up with assist of 1-2 to bedside commode   Voiding spontaneously, adequate UOP with smear loose BM   Pt remains stable and no new acute events overnight   Resting/sleeping well   continue w/POC

## 2021-07-23 NOTE — PROGRESS NOTES
"   07/23/21 1415   Quick Adds   Type of Visit Occupational Therapy Re-evaluation  (Lymphedema)   Living Environment   Living Environment Comments Please see initial OT evaluation from 7/22/2021   Edema General Information   Etiology Comments active cancer, liver disease, HCV, hypervolemia, reduced muscle pump activation   Edema Precautions Active Cancer   Edema Precaution Comments Platelets 74   General Comments/Previous Edema Treatment/Edema Equipment Pt reports she has had mild edema in the past, but did nothing to treat.   Edema Examination/Assessment   Skin Condition Pitting;Intact;Dryness   Skin Integrity Skin is intact, taught, and dry. Pt has a 2x4\" scar from skin cancer on L shin. Pt reports BLE feel heavy and skin feels tight.    Pitting Assessment 1+ pitting through BLE   Edema Assessment Comments Pt is an appropriate candidate for light GCB to promote fluid mobility in BLE for improved pt comfort, improved functional mobility and ADL performance, and to protect skin integrity.   Clinical Impression   Criteria for Skilled Therapeutic Interventions Met (OT) yes;meets criteria;skilled treatment is necessary   OT Diagnosis BLE edema   Edema: Patient Presentation Edema   Edema: Planned Interventions Gradient compression bandaging;Fit for compression garment;Edema exercises;Precautions to prevent infection/exacerbation;Manual therapy;Education;ADL training   Clinical Decision Making Complexity (OT) moderate complexity   Therapy Frequency (OT) 4x/week   Predicted Duration of Therapy x2 weeks   Risk & Benefits of therapy have been explained evaluation/treatment results reviewed;care plan/treatment goals reviewed;risks/benefits reviewed;current/potential barriers reviewed;participants voiced agreement with care plan;participants included;patient   Comment-Clinical Impression Pt presents today w/ BLE edema that is uncomfortable and which limits pt's functional mobility and ADL performance. Pt will benefit from IP " lymphedema therapy to reduce BLE edema.    OT Discharge Planning    OT Discharge Recommendation (DC Rec) Transitional Care Facility   OT Rationale for DC Rec OT: Pt is well below baseline functioning, would benefit from therapy to maximize independence with ADLs and IADLs   Total Evaluation Time (Minutes)   Total Evaluation Time (Minutes) 5

## 2021-07-23 NOTE — PROGRESS NOTES
Hematology / Oncology  Daily Progress Note   Date of Service: 07/23/2021  Patient: Heather Aden  MRN: [id number]  Admission Date: 7/17/2021  Hospital Day # 6    Assessment & Plan:   Heather Aden is a 76 year old female with history of chronic HCV c/b advanced liver fibrosis and splenomegaly, B12 deficiency, and aortic sclerosis with insufficiency who initially presented to [facility] with syncope and dizziness. Was found to have leptomeningeal enhancement on brain MRI and pancytopenia. 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 She was transferred to [facility] for further workup and mgmt of B-ALL. Started dose-reduced modified PETHEMA (Day 1=7/21).      Plan for today  - Today is Day 3 modified PETHEMA   - Sinus tachy 105-120s bpm, unresponsive to fluids. No evidence of sepsis or bleed, and no significant cardiac history. BCx NGTD. Low suspicion for PE d/t negative CT Pulm Angio on 7/13 and pt is on room air. Could be secondary to leukemia and steroids. Continue med/surg telemetry, ok to stay on 7D at this time. Hold IVF  - She is developing DIC without e/o bleed, fibrinogen trending down. Vit K 10mg po x3 days (7/21-7/23). 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 Ultimately found to have progressive pancytopenia compared to prior, with worsening anemia and leukopenia. She had background leukopenia and thrombocytopenia, likely related to chronic liver disease. 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 FISH processed at [facility]- Ph negative, BCR/ABL1 rearrangement absent. Near-triploid karyotype which is a poor prognostic indicator.   - PICC placed  7/18  - Pre-phase with steroids; Dexamethasone 20mg q12h 7/18-7/19. Decreased to 20mg once daily 7/20 and 7/21. Discontinued, will follow steroid regimen per modified PETHEMA protocol as below   - Repeat peripheral flow 7/19 - 10% abnormal B-lymphoblasts. CD20 negative  - Continue allopurinol 300mg daily. Follow TLS/DIC labs once daily  - Given her underlying cirrhosis and age she is unlikely to be a transplant candidate, and would not likely be able to tolerate an intensive regimen (especially one with asparaginase). Will start modified PETHEMA per the PETHEMA ALL-96 trial, 25% dose reduced for liver disease (TBili 1.5). If PETHEMA is poorly tolerated and/or liver function worsens significantly, will consider switching to blinatumomab   - s/p LP w/ IT Methotrexate 7/19 - flow negative.   - Per the chemo protocol as below she would receive triple therapy IT chemo on Days 1 and 28. Will cancel day 1 triple therapy IT chemo as she already received IT methotrexate and flow is negative. 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 Increased to 1334mg TID x7/21. Follow Phos daily   - Additional correction of electrolyte abnormalities (K+, Ca++) PRN per usual means.  - Follow TLS labs once daily    # DIC  # Hypofibrinogenemia   - She is developing DIC without e/o bleed, fibrinogen trending down (155 on 7/21). Start Vit K 10mg po x3 days (7/21-7/23)   - Follow  coagulation labs Q12h, conditional orders for cryo if fibrinogen <100    GI  #HCV w/ compensated cirrhosis  #HBV exposed, not active infection  #Hyperbilirubinemia  #Mild transaminitis  Patient with known HCV infection, and previous HBV exposure (surface Ab positive, core Ab positive, surface Ag negative). Imaging and labs with mild coagulopathy, splenomegaly and thrombocytopenia prior to ALL diagnosis all consistent with cirrhosis/advanced fibrosis. Plan was for repeat imaging and AFP Q6 months. Recently started treatment with sofosbuvir/velpatasvir for HCV infection, although at [place] this was on hold, reportedly per Dr. Leventhal. There is no particular interaction between sofosbuvir/velpatasvir and chemo, although absorption may be reduced with PPI (which she should be on while on Dex).  - HBV DNA negative 7/19  - Hepatology consulted 7/19. Appreciate recommendations;   - Abdominal ultrasound with doppler 7/20- see detailed report   - Resume sofosbuvir-velpatasvir (epclusa) 7/19- continue for a total of 12 weeks (ie ~10/19/21)   - Recommend starting lamivudine along with chemotherapy as HBV ppx, this has a higher rate of resistance but is favored over tenofovir and entacavir whennot needed long term (<1 year). Started lamivudine 100mg daily 7/20, continue through chemotherapy  - Follow up in liver clinic in 4-6 weeks, not yet arranged  - TBili significantly up-trended s/p chemotherapy (1.5 on 7/21 ? 2.3 on 7/22). Follow closely. Recheck HBV DNA if continues to worsen  - Follow CMP and coags    # Esophageal Varices, non-bleeding. S/p banding x2 on 7/21/21  In preparation for chemotherapy and history as above, patient underwent EGD with Hepatology on 7/21. During the EGD she was noted to have large esophageal varices with no active bleed. S/p banding x2 for prophylaxis. No complications  - FLD 7/21, resume regular diet on 7/22  - F/u EGD in 6 weeks, ~ 8/1/21 (outpatient liver team to arrange)    ID   # Concern for  community-acquired pneumonia  # Lactic acidosis  CT Chest at OSH 7/13/21 with small areas of ill-defined nodular opacities at the RUL and RML, probably post infectious/inflammatory in etiology. She has been afebrile on admission and denies cough, but has required 1L intermittently and endorses dyspnea on exertion. Previously not on O2.   - Levaquin 750mg once daily, continue for 7 days total (7/19-7/25)  - Lactic acid 2-3.5. No other infectious symptoms, low suspicion for sepsis. Likely due to underlying leukemia and liver disease    # ID PPX  - ACV 400mg BID (HSV 1+/2+)  - Levaquin increased to 750mg daily due to possible pneumonia, see above  - IV Micafungin 50mg daily. After completion of chemotherapy will de-escalate to ppx fluconazole if ANC <1000  - Nebulized pentamidine for PJP ppx given 7/19/21. Continue monthly, next pentamidine dose due ~ 8/19/21    PULM  # Dyspnea on exertion  # Mild to moderate left pleural effusion  # Atelectasis  # Hypoxia  CT PE at OSH on 7/13 with mild to moderate left basilar pleural effusion and compressive atelectasis. Also with possible inflammation/infection of the RUL and RML. She has remained afebrile. Was started on 1L oxygen at OSH, continued on admission. She endorses dyspnea on exertion, no cough. She is a nonsmoker, no history of lung disease. Etiology of pleural effusion is unclear, could be related to infection vs related to her cirrhosis which would be less likely.   - Antibiotics as above.   - Caution with IVF  - Incentive spirometry encouraged  - Most recently on room air with rest and limited activity    CV  # Sinus tachycardia  Baseline heart rate unclear per chart review. HR up to 140s. EKG with sinus tachycardia. No chest pain or dyspnea at rest. Could be multifactorial in the setting of leukemia, steroids, and borderline hypovolemia secondary to poor PO fluid intake. Echo 7/17/21 - EF 55-60%, otherwise normal.   - Decrease pre-phase steroids to 20mg once daily  7/20  - 500cc NS bolus given on 7/19 and 7/20. Hold continuous fluids and limit IVF boluses due to known pleural effusion and risk for pulmonary edema  - (7/21) Patient continues to have sinus tachy 130s-140bpm. Unresponsive to fluids. No evidence of sepsis or bleed. BCx NGTD. Low suspicion for PE d/t negative CT Pulm Angio on 7/13 and pt is on room air. Ordered med/surg telemetry, ok to stay on 7D at this time    # LE edema  Patient receiving intermittent IVF, last given 500cc on 7/21AM. Now with 1+ LE edema x7/22. Her weight is stable and she is urinating well, not all intake and output is being reported. Pt is a poor historian, unclear how much fluids she is drinking but suspect it is sub-par. No dyspnea noted.   - Hold lasix at this time and limit IVF   - Will continue to monitor     # HTN. Continue amlodipine 5mg daily  # Aortic sclerosis/insufficiency. Moderate AI seen on recent TTE. Continue to monitor      PSYCH  # Disorientation, restlessness, forgetfulness  Patient has been increasingly restless and disoriented overnight 7/18, and 7/19. She is forgetful and sometimes nonsensical. No aphasia. Symptoms generally resolve by mid-morning. No other neurodeficits.   - Could be medication related, she has been receiving dex 20mg BID. Decreased to once daily qam on 7/20.   - Query if related to her liver disease. No asterixis. Per pt report she is having <1BM per day. Per Hepatology, an ammonia level would not be helpful in this setting as it is a clinical diagnosis, but would recommend starting lactulose 20mg daily x7/20, hold or decrease to 10mg daily if >3 BMs/day. Goal is to have 3BMs per day.   - Slightly improved overnight 7/20    # Acute on Chronic Insomnia  She has history of chronic insomnia, acutely worse in the setting of steroids and acute illness. Pt reports that melatonin doesn't help.  - Trazodone 50mg at bedtime prn     FEN/Lytes  #B12 deficiency  #Low-normal folate  Noted to have B12 of 127 with  "folate of 4 on admission at [place]. Will plan to continue with parenteral B12. Will hold off on folate replacement in the setting of IT methotraxate.               - Started oral B12 (per pt preference) 1000mcg daily x 7 days (7/18-7/24), weekly x 1 month, monthly thereafter              - No folate replacement    # Hypermagnesemia  Mag trending up. She is not on any medications that can cause hypermagnesemia. Query if it is related to her liver disease.   - Follow Mag daily     # Severe malnutrition in the context of acute on chronic illness  Patient not eating well for several weeks to months PTA  - RDAT  - Supplements ordered though patient doesn't like the inpatient ensure. Her  is going to bring strawberry-flavored protein supplements from home.  - Follow weights    FEN:  - Encouraged PO intake. Bolus fluids PRN  - PRN lyte replacement  - RDAT     Prophy/Misc:  - VTE: SCDs only. Held for DIC, moderate thrombocytopenia, and high risk for bleed with liver disease and known varices  - GI/PUD: PPI while on steroids  - Bowels: Senna and Miralax PRN  - Activity:    # Deconditioning- Pt severely deconditioned. Encouraged acitivity as tolerated. PT/OT consulted  - Lines: LUE PICC x7/18    Consults: Hepatology (signed off), PT/OT, CAPS team for LP  CODE: Full code  Disposition: Admit to hospital for mgmt of new B-ALL. Will be inpatient for 4-6 weeks  Follow up: Patient has established care with Dr. Washington. Will arrange follow up closer to known discharge date.     Patient was seen and plan of care was discussed with attending physician Dr. Padmini Rosenbaum PA-C  Hematology/Oncology  Pager # [phone]  ___________________________________________________________________    Subjective & Interval History:    Day 3 modified PETHEMA. Tolerated well. Feeling very exhausted again today and overall just doesn't feel great. No specific symptoms. Forgetful but less \"ornery\" and confused. This morning she was " "alert and oriented x4. Per nursing only had a few bowel smears yesterday. Remains stable on room air at rest. Admits to not moving around much yesterday, did sit up in the chair a few times. No cough or sore throat. Worked with OT yesterday to shower. Appetite is pretty poor, eating about 1/2 of her meals but skipped dinner last night. No N/V or abdominal pain. Her appetite has been poor for quite some time. She likes strawberry flavored protein shakes which the hospital doesn't have, so her  will bring some in from home. A comprehensive review of systems was reviewed with the patient and the pertinent positives are listed in the HPI above.  Discussed the plan with the patient today and reviewed the chemo regimen. Supportive  at bedside. She was strongly encouraged to do more activity as tolerated.     Physical Exam:    Blood pressure 133/66, pulse (!) 131, temperature 96.8  F (36  C), temperature source Oral, resp. rate 20, height 1.626 m (5' 4\"), weight 64.2 kg (141 lb 9.6 oz), SpO2 96 %.    General: alert and cooperative thin female, lying in bed, no acute distress. Occasional word-finding difficulties but no aphasia   HEENT: sclera anicteric, EOMI, MMM  Neck: supple, normal ROM  CV: Tachycardic, regular rhythm with rare aberrant rhythm (PAC), normal S1/S2, no m/r/g  Resp: Diminished in left base, no wheezing/crackles, normal respiratory effort on ambient air  GI: slightly firm, non-tender, non-distended, bowel sounds present and normoactive  MSK: warm and well-perfused, no edema. Decreased tone. 1+ LE edema bilaterally  Skin: no rashes on limited exam, no jaundice. Scattered ecchymoses on arms  Neuro: AOx3, moves all extremities equally, no focal deficits. Essential tremor noted in her hands. No asterixis  LUE PICC C/D/I without erythema    Labs & Studies: I personally reviewed the following studies:  ROUTINE LABS (Last four results):  CMP  Recent Labs   Lab 07/23/21  0630 07/22/21  1817 " 07/22/21  0619 07/21/21  1342 07/21/21  0539 07/20/21  0424     --  137  --  138 137   POTASSIUM 4.4  --  4.3  --  4.3 4.2   CHLORIDE 106  --  107  --  107 105   CO2 21  --  21  --  23 21   ANIONGAP 10  --  9  --  8 11   *  --  194*  --  191* 163*   BUN 41*  --  37*  --  34* 26   CR 0.86  --  0.76  --  0.68 0.62   GFRESTIMATED 66  --  77  --  86 88   CEASAR 8.3*  --  7.7*  --  7.5* 7.6*   MAG 3.0*  --  2.8*  --  2.9* 2.3   PHOS 5.0* 4.6* 5.1* 4.8* 5.9* 5.8*   PROTTOTAL 5.0*  --  5.1*  --  5.0* 5.4*   ALBUMIN 2.4*  --  2.5*  --  2.3* 2.4*   BILITOTAL 2.6*  --  2.3*  --  1.5* 1.4*   ALKPHOS 68  --  65  --  63 52   AST 96*  --  89*  --  74* 63*   ALT 38  --  33  --  25 17     CBC  Recent Labs   Lab 07/23/21 0630 07/22/21  0619 07/21/21  0641 07/21/21  0539 07/20/21  0424   WBC 0.6* 1.0* 1.3* 0.9* 1.1*   RBC 2.46* 2.42* 2.57*  --  2.26*   HGB 7.8* 7.7* 8.2* 7.2* 7.1*   HCT 24.3* 24.2* 25.9* 23.4* 22.6*   MCV 99 100 101* 105* 100   MCH 31.7 31.8 31.9 32.1 31.4   MCHC 32.1 31.8 31.7 30.8* 31.4*   RDW 20.8* 21.7* 22.7* 22.5* 22.6*   PLT 56* 74* 85* 52* 80*     INR  Recent Labs   Lab 07/23/21 0630 07/22/21  1817 07/22/21  0619 07/21/21  0539   INR 1.49* 1.50* 1.53* 1.61*       Microbiology  Recent Labs   Lab 07/23/21  0905 07/21/21  0641 07/20/21  0424 07/19/21  0551   LACT 2.3* 3.6* 3.4* 3.3*       Pertinent Imaging    Medications list for reference:  Current Facility-Administered Medications   Medication     0.9% sodium chloride BOLUS     acetaminophen (TYLENOL) tablet 650 mg     acyclovir (ZOVIRAX) tablet 400 mg     albuterol (PROAIR HFA/PROVENTIL HFA/VENTOLIN HFA) 108 (90 Base) MCG/ACT inhaler 1-2 puff     albuterol (PROVENTIL) neb solution 2.5 mg     albuterol (PROVENTIL) neb solution 2.5 mg    And     pentamidine (NEBUPENT) neb solution 300 mg     allopurinol (ZYLOPRIM) tablet 300 mg     amLODIPine (NORVASC) tablet 5 mg     calcium acetate (PHOSLO) capsule 1,334 mg     cyanocobalamin (VITAMIN B-12) tablet  1,000 mcg     [START ON 7/25/2021] cyanocobalamin (VITAMIN B-12) tablet 1,000 mcg     [START ON 8/17/2021] cytarabine (PF) (CYTOSAR) 30 mg, hydrocortisone sodium succinate PF (solu-CORTEF) 20 mg, methotrexate (PF) 12 mg in sodium chloride (PF) 0.9% PF 6 mL intrathecal inj (PF)     DAUNOrubicin HCl (CERUBIDINE) 37 mg in sodium chloride 0.9 % 62 mL infusion     diphenhydrAMINE (BENADRYL) injection 50 mg     EPINEPHrine PF (ADRENALIN) injection 0.3 mg     famotidine (PEPCID) infusion 20 mg     heparin lock flush 10 UNIT/ML injection 5-20 mL     heparin lock flush 10 UNIT/ML injection 5-20 mL     [START ON 7/24/2021] lactulose (CHRONULAC) solution 20 g     lamiVUDine (EPIVIR) tablet 100 mg     [START ON 7/26/2021] levofloxacin (LEVAQUIN) tablet 250 mg     levofloxacin (LEVAQUIN) tablet 750 mg     lidocaine (LMX4) cream     lidocaine 1 % 0.1-1 mL     LORazepam (ATIVAN) tablet 0.5-1 mg    Or     LORazepam (ATIVAN) injection 0.5-1 mg     melatonin tablet 10 mg     meperidine (DEMEROL) injection 25 mg     methylPREDNISolone sodium succinate (solu-MEDROL) injection 125 mg     micafungin (MYCAMINE) 50 mg in sodium chloride 0.9 % 100 mL intermittent infusion     naloxone (NARCAN) injection 0.2 mg     No rectal suppositories if WBC less than 1000/microliters or platelets less than 50,000/ L     omeprazole (priLOSEC) CR capsule 20 mg     ondansetron (ZOFRAN) injection 8 mg    Or     ondansetron (ZOFRAN-ODT) ODT tab 8 mg    Or     ondansetron (ZOFRAN) tablet 8 mg     ondansetron (ZOFRAN) tablet 16 mg     polyethylene glycol (MIRALAX) Packet 17 g     [START ON 8/17/2021] predniSONE (DELTASONE) 25 mg     predniSONE (DELTASONE) tablet 50 mg     prochlorperazine (COMPAZINE) tablet 5 mg    Or     prochlorperazine (COMPAZINE) injection 5 mg     senna-docusate (SENOKOT-S/PERICOLACE) 8.6-50 MG per tablet 1 tablet    Or     senna-docusate (SENOKOT-S/PERICOLACE) 8.6-50 MG per tablet 2 tablet     sodium chloride (OCEAN) 0.65 % nasal spray 1  spray     sodium chloride (PF) 0.9% PF flush 10-20 mL     sodium chloride (PF) 0.9% PF flush 10-20 mL     sodium chloride (PF) 0.9% PF flush 10-40 mL     sodium chloride (PF) 0.9% PF flush 10-40 mL     sodium chloride (PF) 0.9% PF flush 3 mL     sodium chloride (PF) 0.9% PF flush 3 mL     sofosbuvir-velpatasvir (EPCLUSA) per tablet 1 tablet     traZODone (DESYREL) tablet 50 mg     vinCRIStine (ONCOVIN) 2 mg in sodium chloride 0.9 % 30 mL infusion

## 2021-07-23 NOTE — PLAN OF CARE
00:00-07:00 am  AF /60's  HR remains tachy in 100-120's  OVSS   Continue on telemetry  A&Ox 4 but can be forgetful  Bed alarm on for safety  Today is day 2 modified PETHEMA treatment plan  Besides slightly nausea without emesis, tolerating chemo well.  Declined need for prn antiemetics  Still slight YIP with activities but otherwise denied pain, chest pain, and SOB.  Bladder scan at 06:00 am shown 475 ml  Assisted to BSC, voided 450 ml spontaneously  No BM this shift  Poor appetite and appears dehydrated  Encourage fluid intake  Pt is stable and no new acute events overnight  Resting/sleeping well  Closely monitoring DIC/TLS lab  Continue w/POC

## 2021-07-23 NOTE — CODE/RAPID RESPONSE
Rapid Response Team Note    Assessment   In assessment a rapid response was called on Heather Aden due to lactic acidosis. This presentation is likely due to known malignancy and chemotherapy and worsened by DIC, pancytopenia, concern for TLS.     Plan   -  Repeat blood cultures x 2, LR 500cc bolus x 1, repeat lactate check 11:00  - already on levofloxacin, MRSA swab  - EKG given tachycardia  - consider low threshold to obtain CT PE protocol *  -  The Hematology/Oncology primary team was paged and currently awaiting a response.  -  Disposition: The patient will remain on the current unit. We will continue to monitor this patient closely.  -  Reassessment and plan follow-up will be performed by the rapid response team      Addendum note:   Follow up repeat lactate increased to 2.5 in the setting of above. Pt has been refusing lactulose per nursing and per primary team sx are not c/w infection.  Will defer further infectious workup and antimicrobials to primary team, please contact RRT with questions    Jena Guzman PA-C  [facility] AMCOM Job Code Contact #[phone]    Hospital Course   Brief Summary of events leading to rapid response:   Low WBC, hypothermia, tachycardia triggered RN-guided lactate check which is 2.3    Patient notes feeling very poorly with severe nausea and generalized weakness/lethargy. She has shortness of breath at rest and had a headache overnight.  She is tolerating small amounts of breakfast.  Endorses urinary hessitancy without incomplete emptying and does not endorse dysuria. + chills without fevers    Denies pain including in the abdomen or chest.   Denies dizziness/lightheadedness,   Denies bleeding, including epistaxis, hematuria, melena/hematochezia.    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  CV: tachycardia, RRR, no m/r/g  PULM: diminished bases, CTAB  Extremities: 3+ bilateral LE pitting edema, + pulses  Skin: noncyanotic anicteric      Significant Results and Procedures   Lactic Acid:   Recent Labs   Lab Test 21  0905 21  0641 21  0424   LACT 2.3* 3.6* 3.4*     CBC:   Recent Labs   Lab Test 21  0630 21  0619 21  0641   WBC 0.6* 1.0* 1.3*   HGB 7.8* 7.7* 8.2*   HCT 24.3* 24.2* 25.9*   PLT 56* 74* 85*        Sepsis Evaluation   The patient is not known to have an infection.  Heather Aden meets SIRS criteria but does NOT have a lactate >2 or other evidence of acute organ damage.  These vital sign, lab and physical exam findings constitute a diagnosis of SEPSIS.    Sepsis Time-Zero (time Sepsis diagnosis confirmed): 9:30  21    Anti-infectives (From now, onward)    Start     Dose/Rate Route Frequency Ordered Stop    21 0800  levofloxacin (LEVAQUIN) tablet 250 mg      250 mg Oral DAILY 21 1411      21 1200  lamiVUDine (EPIVIR) tablet 100 mg      100 mg Oral DAILY 21 1154      21 0800  levofloxacin (LEVAQUIN) tablet 750 mg      750 mg Oral DAILY 21 1417 21 0759    21 0800  sofosbuvir-velpatasvir (EPCLUSA) per tablet 1 tablet      1 tablet Oral DAILY 21 1615      21 1200  micafungin (MYCAMINE) 50 mg in sodium chloride 0.9 % 100 mL intermittent infusion      50 mg  100 mL/hr over 60 Minutes Intravenous EVERY 24 HOURS 21 0913   pentamidine (NEBUPENT) neb solution 300 mg      300 mg Inhalation EVERY 28 DAYS 07/18/21 0921      07/18/21 0800  acyclovir (ZOVIRAX) tablet 400 mg      400 mg Oral 2 TIMES DAILY 07/17/21 1954          Current antibiotic coverage appropriate

## 2021-07-23 NOTE — PLAN OF CARE
[id number]  BP max 144/50, afebrile, on continuous tele monitoring, heartrate tachy 130's max  agnes pain/sob. Nausea intermittently, refuse medication/intervention. Alert and oriented x 4,  Up with 1 assist with gait belt and walker, using bedside commode, voided once for shift, refuse to go bathroom, bladder scan 307 ml and voided 400 ml @ 2130. PICC is intact, good blood return and hep locked. Bed alan is on for safety. Refuse to eat supper, no appetite.  Continue to monitor care.

## 2021-07-23 NOTE — PROVIDER NOTIFICATION
07/23/21 0900   Call Information   Date of Call 07/23/21   Time of Call 0923   Name of person requesting the team Pauline Charles   Title of person requesting team RN   RRT Arrival time 0924   Time RRT ended 0954   Reason for call   Type of RRT Adult   Primary reason for call Sepsis suspected   Sepsis Suspected Elevated Lactate level;Heart Rate > 100;BMT/Oncology patient with WBC<0.5 or >12 or ANC <500   Was patient transferred from the ED, ICU, or PACU within last 24 hours prior to RRT call? No   SBAR   Situation Lactic Acid 2.3, , WBC 0.6.   Background Newly diagnosed ALL.  Hx aortic stenosis, Hepatitis C, substance abuse d/o.   Notable History/Conditions Cancer;Cardiac;Hypertension   Assessment A+O x 4. Denies C.P. fever, chills.  -120's/regular. Other vss.  RR 18-20 on RA with 02 sats upper 90's.   Interventions Fluid bolus;Labs;Meds;Portable monitor   Patient Outcome   Patient Outcome Stabilized on unit   RRT Team   Attending/Primary/Covering Physician Jena Guzman PA-C   Date Attending Physician notified 07/23/21   Time Attending Physician notified 0923   Physician(s) Internal Medicine team notified.   Lead RN Charmaine Charles   RT Marcie Lund   Post RRT Intervention Assessment   Post RRT Assessment Stable/Improved   Date Follow Up Done 07/23/21   Time Follow Up Done 1231   Comments Remains afebrile with vs at baseline.  Respirations easy in RA with 02 sats in upper 90's.  Continue to monitor closely for s/s sepsis.

## 2021-07-23 NOTE — PLAN OF CARE
"Focus: Sepsis Lactic 2.3    Data:   -Pt triggered sepsis protocol this am. WBC 0.6 and . /51. Afebrile.  Lactic acid 2.3. Gloria MEEHAN (HUSSEIN) & Rapid Response called. LR 500ml bolus given and blood cultures from central line drawn. No blood cultures from peripheral stick needed per Dr. Pulliam, \"Pt is not septic\". Waiting for lactic acid result. MRSA swab nares sent and EKG done.     Intervention:   Monitoring for s/s of sepsis.     Assessment:   Pt is stable. Does not look septic    Plan: Continue with telemetry monitoring.      -Pt refusing to take her lactulose med because she does not like the flavor of it. Nursing has made several attempts to tried to get pt to take lactulose with no success. Nursing Explain to pt the importance of the drug but pt still refusing.  No BM today. Goal of 3 BM's /day. Continues to monitor for confusion.     -Bladder scan this afternoon for 500ml. Pt needs a lot of motivation and encouragement to get out of bed and void in the commode. Pt voided 400ml on her own.           "

## 2021-07-24 NOTE — PROGRESS NOTES
Hematology / Oncology  Daily Progress Note   Date of Service: 07/24/2021  Patient: Heather Aden  MRN: [id number]  Admission Date: 7/17/2021  Hospital Day # 7    Assessment & Plan:   Heather Aden is a 76 year old female with history of chronic HCV c/b compensated liver cirrhosis and splenomegaly, Hep B exposure, B12 deficiency, and aortic sclerosis with insufficiency who initially presented to [facility] with syncope and dizziness. Was found to have leptomeningeal enhancement on brain MRI and pancytopenia. 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 She was transferred to [facility] for further workup and mgmt of B-ALL. Started modified PETHEMA (Day 1=7/21), dose reduced for liver function. Complicated by worsening liver function, DIC without evidence of bleed, severe malnutrition and deconditioning.     Plan for today  - Today is Day 4 modified PETHEMA   - Given Cryo (5u) overnight for fibrinogen 99. Follow coags BID, conditional orders for cryo if fgn <100. No evidence of bleeding  - Continue Levaquin 750mg daily due to OSH CT findings 7/13 concerning for possible pneumonia (7/19-7/25). 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 Ultimately found to have progressive pancytopenia compared to prior, with worsening anemia and leukopenia. She had background leukopenia and thrombocytopenia, likely related to chronic liver disease. 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 FISH processed at [facility]- Ph negative, BCR/ABL1 rearrangement absent. Baseline performance status over the last few months has been 3. Dr. Washignton will be her primary  Heme Malignancy provider.   - Cytogenetics from [facility]: Complex Karyotype 6-72,XXX,<3N>,+1,-2,-3,-4,add (6)(q12),-8,add(11)(p11.2),-17,-18,+6-9mar,inc[cp2]/46,XX[7], near triploid karyotype with numerous abnormalities  - PICC placed 7/18  - Pre-phase with steroids; Dexamethasone 20mg q12h 7/18-7/19. Decreased to 20mg once daily 7/20 and 7/21. Discontinued, will follow steroid regimen per modified PETHEMA protocol as below   - Repeat peripheral flow 7/19 - 10% abnormal B-lymphoblasts. CD20 negative  - Given her underlying cirrhosis and age she is unlikely to be a transplant candidate, and would not likely be able to tolerate an intensive regimen (especially one with asparaginase). Will start modified PETHEMA per the PETHEMA ALL-96 trial, 25% dose reduced for liver disease (TBili 1.5 on Day 1). If Tbili >3 will require further dose-adjustments  - If PETHEMA is poorly tolerated and/or liver function worsens significantly, will consider switching to blinatumomab      Treatment Plan: Modified PETHEMA (C1D1=7/21/21)   - Vincristine 2mg once daily Days 1,8,15,22   - Daunorubicin 22.5mg/m2 (37mg) once daily Days 1,8,15,22   - Prednisone 50mg BID Days 1-27, 20mg BID Days 28-35   - Supportive medications: Zofran 16mg once daily Days 1,8,15,22    # CNS ppx   - LP w/ IT Methotrexate 7/19 - flow negative.  - Per the chemo protocol as above she would receive triple therapy IT chemo on Days 1 and 28. Will cancel day 1 triple therapy IT chemo as she already received IT methotrexate and flow is negative.   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 Increased to 1334mg TID x7/21, further increased to 2001mg TID x7/24. Follow Phos daily   - Additional  correction of electrolyte abnormalities (K+, Ca++) PRN per usual means.  - Follow TLS labs once daily    # DIC  # Hypofibrinogenemia   - She is developing DIC without e/o bleed, fibrinogen trending down (155 on 7/21). Start Vit K 10mg po x3 days (7/21-7/23)   - (7/23) Fibrinogen 99, given Cryo (5u)   - Follow coagulation labs Q12h, conditional orders for cryo if fibrinogen <100    GI  #HCV w/ compensated cirrhosis  #HBV exposed, not active infection  #Hyperbilirubinemia  #Mild transaminitis  Patient with known HCV infection, and previous HBV exposure (surface Ab positive, core Ab positive, surface Ag negative). Imaging and labs with mild coagulopathy, splenomegaly and thrombocytopenia prior to ALL diagnosis all consistent with cirrhosis/advanced fibrosis. Plan was for repeat imaging and AFP Q6 months. Recently started treatment with sofosbuvir/velpatasvir for HCV infection, although at [place] this was on hold, reportedly per Dr. Leventhal. There is no particular interaction between sofosbuvir/velpatasvir and chemo, although absorption may be reduced with PPI (which she should be on while on Dex).  - HBV DNA negative 7/19  - Hepatology consulted 7/19. Appreciate recommendations;   - Abdominal ultrasound with doppler 7/20- see detailed report   - Resume sofosbuvir-velpatasvir (epclusa) 7/19- continue for a total of 12 weeks (ie ~10/19/21)   - Recommend starting lamivudine along with chemotherapy as HBV ppx, this has a higher rate of resistance but is favored over tenofovir and entacavir whennot needed long term (<1 year). Started lamivudine 100mg daily 7/20, continue through chemotherapy  - Follow up in liver clinic in 4-6 weeks, not yet arranged  - TBili significantly up-trended s/p chemotherapy (1.5 on 7/21 ? 2.3 on 7/22). Follow closely. Recheck HBV DNA if continues to worsen  - Follow CMP and coags    # Esophageal Varices, non-bleeding. S/p banding x2 on 7/21/21  In preparation for chemotherapy and history as  above, patient underwent EGD with Hepatology on 7/21. During the EGD she was noted to have large esophageal varices with no active bleed. S/p banding x2 for prophylaxis. No complications  - continue regular diet  - F/u EGD in 6 weeks, ~ 8/1/21 (outpatient liver team to arrange)    ID   # Concern for community-acquired pneumonia  # Lactic acidosis  CT Chest at OSH 7/13/21 with small areas of ill-defined nodular opacities at the RUL and RML, probably post infectious/inflammatory in etiology. She has been afebrile on admission and denies cough, but has required 1L intermittently and endorses dyspnea on exertion. Previously not on O2.   - Levaquin 750mg once daily, continue for 7 days total (7/19-7/25)  - Lactic acid 2-3.5. No other infectious symptoms, low suspicion for sepsis. Likely due to underlying leukemia and liver disease  - Limit IVF boluses due to known pleural effusion and risk for pulmonary edema    # ID PPX  - ACV 400mg BID (HSV 1+/2+)  - Levaquin increased to 750mg daily due to possible pneumonia, see above  - IV Micafungin 50mg daily. After completion of chemotherapy will de-escalate to ppx fluconazole if ANC <1000  - Nebulized pentamidine for PJP ppx given 7/19/21. Continue monthly, next pentamidine dose due ~ 8/19/21    PULM  # Dyspnea on exertion  # Mild to moderate left pleural effusion  # Atelectasis  # Hypoxia  CT PE at OSH on 7/13 with mild to moderate left basilar pleural effusion and compressive atelectasis. Also with possible inflammation/infection of the RUL and RML. She has remained afebrile. Was started on 1L oxygen at OSH, continued on admission. She endorses dyspnea on exertion, no cough. She is a nonsmoker, no history of lung disease. Etiology of pleural effusion is unclear, could be related to infection vs related to her cirrhosis which would be less likely.   - Antibiotics as above.   - Caution with IVF  - Incentive spirometry encouraged  - Most recently on room air with rest and limited  activity    CV  # Sinus tachycardia  Baseline heart rate unclear per chart review. HR up to 140s. EKG with sinus tachycardia. No chest pain or dyspnea at rest. Could be multifactorial in the setting of leukemia, steroids, and borderline hypovolemia secondary to poor PO fluid intake. Echo 7/17/21 - EF 55-60%, otherwise normal.   - Decreased pre-phase steroids with slight improvement x7/20  - (7/21) Patient continues to have sinus tachy 130s-140bpm. Unresponsive to fluids. No evidence of sepsis or bleed. BCx NGTD. Low suspicion for PE d/t negative CT Pulm Angio on 7/13 and pt is on room air. Ordered med/surg telemetry, ok to stay on 7D at this time  - limit IVF boluses due to known pleural effusion and risk for pulmonary edema    # LE edema  Patient receiving intermittent IVF, last given 500cc on 7/21AM. Now with 1+ LE edema x7/22. Her weight is stable and she is urinating well, not all intake and output is being reported. Pt is a poor historian, unclear how much fluids she is drinking but suspect it is limited. No dyspnea noted.   - Hold lasix at this time and limit IVF   - Will continue to monitor     # HTN. Continue amlodipine 5mg daily  # Aortic sclerosis/insufficiency. Moderate AI seen on recent TTE. Continue to monitor      NEURO/PSYCH  # Disorientation, restlessness -- resolved   # forgetfulness   Patient has been increasingly restless and disoriented overnight 7/18, and 7/19. She is forgetful and sometimes nonsensical. No aphasia. Symptoms generally resolve by mid-morning. No other neurodeficits.   - Could be medication related, she has been receiving dex 20mg BID. Decreased to once daily qam on 7/20.   - Query if related to her liver disease. No asterixis. Per pt report she is having <1BM per day. Per Hepatology, an ammonia level would not be helpful in this setting as it is a clinical diagnosis, but would recommend starting lactulose 20mg daily x7/20, hold or decrease to 10mg daily if >3 BMs/day. Goal is to  have 3BMs per day.   - Disorientation and restlessness significantly improved since 7/21. Continues to be forgetful    # Meningeal enhancement  Nonspecific homogeneous meningeal enhancement noted on MRI brain at OSH on 7/14/21.  - LP flow negative on 7/19. No further workup needed, likely benign    # Acute on Chronic Insomnia  She has history of chronic insomnia, acutely worse in the setting of steroids and acute illness. Pt reports that melatonin doesn't help.  - Trazodone 50mg at bedtime prn     FEN/Lytes  #B12 deficiency  #Low-normal folate  Noted to have B12 of 127 with folate of 4 on admission at [place]. Will plan to continue with parenteral B12. Will hold off on folate replacement in the setting of IT methotraxate.               - Started oral B12 (per pt preference) 1000mcg daily x 7 days (7/18-7/24), weekly x 1 month (7/25-8/22/21), monthly thereafter              - No folate replacement    # Hypermagnesemia  Mag trending up. She is not on any medications that can cause hypermagnesemia. Query if it is related to her liver disease.   - Follow Mag daily     # Severe malnutrition in the context of acute on chronic illness  Patient not eating well for several weeks to months PTA.   - RDAT. Supplements ordered though patient doesn't like the inpatient ensure. Her  brought strawberry-flavored protein supplements from home.  - She is only eating about 1 meal per day despite being on steroids for ALL. Will try to hold off on TPN as able and continue to encourage small frequent meals.   - RD following  - Follow weights    FEN:  - Encouraged PO intake. Bolus fluids PRN  - PRN lyte replacement  - RDAT     Prophy/Misc:  - VTE: SCDs only. Held for DIC, moderate thrombocytopenia, and high risk for bleed with liver disease and known varices  - GI/PUD: PPI while on steroids  - Bowels: Senna and Miralax PRN  - Activity:    # Deconditioning- Pt severely deconditioned. Encouraged acitivity as tolerated. PT/OT  "consulted  - Lines: [facility]ED [facility] x7/18    Consults: Hepatology (signed off), PT/OT, CAPS team for LP  CODE: Full code  Disposition: Admit to hospital for mgmt of new B-ALL. Will be inpatient for 4-6 weeks  Follow up: Patient has established care with Dr. Washington. Will arrange follow up closer to known discharge date.     Patient was seen and plan of care was discussed with attending physician Dr. Padmini Rosenbaum PA-C  Hematology/Oncology  Pager # [phone]  ___________________________________________________________________    Subjective & Interval History:    Day 4 modified PETHEMA. Slept better than usual last night but feeling very exhausted again today and overall just doesn't feel great. No specific symptoms. Forgetful but oriented. Withdrawn, quiet, doesn't want to partake in discussions regarding her health. She has been bladder scanned for significant volume several times, requires prompting to get up to use the commode but once she gets the commode she is able to urinate well. Had at least 2 BMs yesterday. Remains stable on room air at rest. Admits to not moving around at all yesterday. No cough or sore throat. Appetite is pretty poor, only ate breakfast yesterday. No N/V or abdominal pain. Her appetite has been poor for quite some time. She likes strawberry flavored protein shakes which the hospital doesn't have, so her  brought some in from home today. A comprehensive review of systems was reviewed with the patient and the pertinent positives are listed in the HPI above.  Discussed the plan with the patient today and reviewed the chemo regimen. Supportive  at bedside. She was strongly encouraged to do more activity as tolerated.     Physical Exam:    Blood pressure 128/61, pulse (!) 129, temperature (!) 96.3  F (35.7  C), temperature source Oral, resp. rate 24, height 1.626 m (5' 4\"), weight 64.2 kg (141 lb 9.6 oz), SpO2 96 %.    General: alert and cooperative thin female, lying in " bed, no acute distress. Occasional word-finding difficulties but no aphasia   HEENT: sclera anicteric, EOMI, MMM  Neck: supple, normal ROM  CV: Tachycardic, regular rhythm with rare aberrant rhythm (PAC), normal S1/S2, no m/r/g  Resp: Diminished in left base, no wheezing/crackles, normal respiratory effort on ambient air  GI: slightly firm, non-tender, non-distended, bowel sounds present and normoactive  MSK: warm and well-perfused, no edema. Decreased tone. 1+ LE edema bilaterally  Skin: no rashes on limited exam, no jaundice. Scattered ecchymoses on arms  Neuro: AOx3, moves all extremities equally, no focal deficits. Essential tremor noted in her hands. No asterixis  LUE PICC C/D/I without erythema    Labs & Studies: I personally reviewed the following studies:  ROUTINE LABS (Last four results):  CMP  Recent Labs   Lab 07/24/21  0649 07/23/21  1737 07/23/21  0630 07/22/21  1817 07/22/21  0619 07/21/21  0539     --  137  --  137 138   POTASSIUM 4.6  --  4.4  --  4.3 4.3   CHLORIDE 106  --  106  --  107 107   CO2 22  --  21  --  21 23   ANIONGAP 9  --  10  --  9 8   *  --  199*  --  194* 191*   BUN 43*  --  41*  --  37* 34*   CR 0.86  --  0.86  --  0.76 0.68   GFRESTIMATED 66  --  66  --  77 86   CEASAR 8.7  --  8.3*  --  7.7* 7.5*   MAG 2.8*  --  3.0*  --  2.8* 2.9*   PHOS 5.4* 5.0* 5.0* 4.6* 5.1* 5.9*   PROTTOTAL 4.8*  --  5.0*  --  5.1* 5.0*   ALBUMIN 2.4*  --  2.4*  --  2.5* 2.3*   BILITOTAL 3.0*  --  2.6*  --  2.3* 1.5*   ALKPHOS 72  --  68  --  65 63   AST 93*  --  96*  --  89* 74*   ALT 37  --  38  --  33 25     CBC  Recent Labs   Lab 07/24/21  0649 07/23/21  0630 07/22/21  0619 07/21/21  0641   WBC 0.4* 0.6* 1.0* 1.3*   RBC 2.28* 2.46* 2.42* 2.57*   HGB 7.1* 7.8* 7.7* 8.2*   HCT 22.3* 24.3* 24.2* 25.9*   MCV 98 99 100 101*   MCH 31.1 31.7 31.8 31.9   MCHC 31.8 32.1 31.8 31.7   RDW 20.6* 20.8* 21.7* 22.7*   PLT 28* 56* 74* 85*     INR  Recent Labs   Lab 07/24/21  0649 07/23/21  1737 07/23/21  0630  07/22/21  1817   INR 1.45* 1.54* 1.49* 1.50*       Microbiology  Recent Labs   Lab 07/23/21  1501 07/23/21  0905 07/21/21  0641 07/20/21  0424   LACT 2.5* 2.3* 3.6* 3.4*       Pertinent Imaging    Medications list for reference:  Current Facility-Administered Medications   Medication     0.9% sodium chloride BOLUS     acetaminophen (TYLENOL) tablet 650 mg     acyclovir (ZOVIRAX) tablet 400 mg     albuterol (PROAIR HFA/PROVENTIL HFA/VENTOLIN HFA) 108 (90 Base) MCG/ACT inhaler 1-2 puff     albuterol (PROVENTIL) neb solution 2.5 mg     albuterol (PROVENTIL) neb solution 2.5 mg    And     pentamidine (NEBUPENT) neb solution 300 mg     allopurinol (ZYLOPRIM) tablet 300 mg     amLODIPine (NORVASC) tablet 5 mg     calcium acetate (PHOSLO) capsule 2,001 mg     cyanocobalamin (VITAMIN B-12) tablet 1,000 mcg     [START ON 7/25/2021] cyanocobalamin (VITAMIN B-12) tablet 1,000 mcg     [START ON 8/17/2021] cytarabine (PF) (CYTOSAR) 30 mg, hydrocortisone sodium succinate PF (solu-CORTEF) 20 mg, methotrexate (PF) 12 mg in sodium chloride (PF) 0.9% PF 6 mL intrathecal inj (PF)     DAUNOrubicin HCl (CERUBIDINE) 37 mg in sodium chloride 0.9 % 62 mL infusion     diphenhydrAMINE (BENADRYL) injection 50 mg     EPINEPHrine PF (ADRENALIN) injection 0.3 mg     famotidine (PEPCID) infusion 20 mg     heparin lock flush 10 UNIT/ML injection 5-20 mL     heparin lock flush 10 UNIT/ML injection 5-20 mL     lactulose (CHRONULAC) solution 20 g     lamiVUDine (EPIVIR) tablet 100 mg     [START ON 7/26/2021] levofloxacin (LEVAQUIN) tablet 250 mg     levofloxacin (LEVAQUIN) tablet 750 mg     lidocaine (LMX4) cream     lidocaine 1 % 0.1-1 mL     LORazepam (ATIVAN) tablet 0.5-1 mg    Or     LORazepam (ATIVAN) injection 0.5-1 mg     melatonin tablet 10 mg     meperidine (DEMEROL) injection 25 mg     methylPREDNISolone sodium succinate (solu-MEDROL) injection 125 mg     micafungin (MYCAMINE) 50 mg in sodium chloride 0.9 % 100 mL intermittent infusion      naloxone (NARCAN) injection 0.2 mg     No rectal suppositories if WBC less than 1000/microliters or platelets less than 50,000/ L     omeprazole (priLOSEC) CR capsule 20 mg     ondansetron (ZOFRAN) injection 8 mg    Or     ondansetron (ZOFRAN-ODT) ODT tab 8 mg    Or     ondansetron (ZOFRAN) tablet 8 mg     ondansetron (ZOFRAN) tablet 16 mg     polyethylene glycol (MIRALAX) Packet 17 g     [START ON 8/17/2021] predniSONE (DELTASONE) 25 mg     predniSONE (DELTASONE) tablet 50 mg     prochlorperazine (COMPAZINE) tablet 5 mg    Or     prochlorperazine (COMPAZINE) injection 5 mg     senna-docusate (SENOKOT-S/PERICOLACE) 8.6-50 MG per tablet 1 tablet    Or     senna-docusate (SENOKOT-S/PERICOLACE) 8.6-50 MG per tablet 2 tablet     sodium chloride (OCEAN) 0.65 % nasal spray 1 spray     sodium chloride (PF) 0.9% PF flush 10-20 mL     sodium chloride (PF) 0.9% PF flush 10-20 mL     sodium chloride (PF) 0.9% PF flush 10-40 mL     sodium chloride (PF) 0.9% PF flush 10-40 mL     sodium chloride (PF) 0.9% PF flush 3 mL     sodium chloride (PF) 0.9% PF flush 3 mL     sofosbuvir-velpatasvir (EPCLUSA) per tablet 1 tablet     traZODone (DESYREL) tablet 50 mg     vinCRIStine (ONCOVIN) 2 mg in sodium chloride 0.9 % 30 mL infusion

## 2021-07-24 NOTE — PROVIDER NOTIFICATION
Pt triggered SIR w/  - 125, LA 3.5, RRT& provider paged/notified. Pt asymptomatic, stable, ex feeling fatigue, denies pain/nausea/sob, on RA.  Continue to monitor...  Provider/RRT assessed pt (see note)....

## 2021-07-24 NOTE — PLAN OF CARE
[id number]  VSS, heart rate tachy, 120-130. On continuous tele monitoring. Alert and oriented x 4, agnes pain/sob. Has nausea intermittently, given PRN IV Zofran. Pt refuse to get up to commode, offer twice, and refuse. Pt voided 225 ml, I small BM @ 2030  On bed alarm for safety. Triggered sepsis during the day shift, lactic is 2.3 called rapid during day shift, and given Bolus.Lactic recheck at 1500 is 2.5, MD notified.   PICC is intact, good blood return and hep locked. Critical lab for fibrinogen 99, INR 1.54 Needs to transfuse cryoprecipitate, transfusing cryo, no transfusion reaction after the 10 mins vital signs.  Continue to monitor care.

## 2021-07-24 NOTE — PROGRESS NOTES
07/24/21 1100   Call Information   Date of Call 07/24/21   Time of Call 1146   Name of person requesting the team Jeramie   Title of person requesting team RN   RRT Arrival time 1150   Time RRT ended 1155   Reason for call   Type of RRT Adult   Primary reason for call Sepsis suspected   Sepsis Suspected Elevated Lactate level;Heart Rate > 100;RR > 20, SaO2 <90% OR increasing O2 need   Was patient transferred from the ED, ICU, or PACU within last 24 hours prior to RRT call? No   SBAR   Situation LA 3.5   Background History of hep C and hep B core Ab pos, HSV, and hx of substance use disorder. admitted from  OSH after syncope and found to be pancytopenic. 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    Notable History/Conditions Hypertension;Cardiac;Cancer   Assessment pt alert and oriented. . RR 28.. OVSS   Interventions Labs  (blood culture)   Patient Outcome   Patient Outcome Stabilized on unit   RRT Team   Date Attending Physician notified 07/24/21   Time Attending Physician notified 1146   Physician(s) Jena MORRISON   Lead RN María Elena Bobo/Wiliam   Post RRT Intervention Assessment   Post RRT Assessment Stable/Improved   Date Follow Up Done 07/24/21   Time Follow Up Done 1400

## 2021-07-24 NOTE — PROVIDER NOTIFICATION
Provider notification:     TAMIKO Rosenbaum notified at 0828 Via web paging system.    Reason for notification:   FYI per protocol: WBC 0.4, Plt 28. Thanks!

## 2021-07-24 NOTE — PLAN OF CARE
"/69   Pulse (!) 125   Temp (!) 96.1  F (35.6  C) (Oral)   Resp 20   Ht 1.626 m (5' 4\")   Wt 64.2 kg (141 lb 9.6 oz)   SpO2 97%   BMI 24.31 kg/m      11:00-15:00    VSS ex HR(100-120) during shift on RA. Pt triggered SIRS and RRT called. Lactate resulted at 3.5. No s/s and afebrile during shift. No reports of SOB, nausea, or pain. Pt affect quiet and withdrawn, refusing to get OOB. Pt bladder scanned for 700 mL and encouraged to transfer to commode with 200 mL output. Pt. also refusing to take meds after multiple reminders and encouragement. Sinus tach on tele. Up with A1 with GB/Walker. No BM during shift, BS + x4. Regular diet and L. PICC hep locked. Will continue to monitor.  "

## 2021-07-24 NOTE — PLAN OF CARE
"[id number]    Blood pressure 128/61, pulse (!) 129, temperature (!) 96.3  F (35.7  C), temperature source Oral, resp. rate 24, height 1.626 m (5' 4\"), weight 64.2 kg (141 lb 9.6 oz), SpO2 96 %.    Tachycardic. Pt withdrawn and quiet after conversation regarding POA with PA this morning. Tolerated IV ABX well. Lactic triggered, awaiting for lab result. PICC hep locked.    Continue w/ POC.   "

## 2021-07-24 NOTE — CODE/RAPID RESPONSE
Rapid Response Team Note    Assessment   In assessment a rapid response was called on Heather Aden due to lactic acidosis. This presentation is likely due to known malignancy on chemotherapy and worsened by DIC, pancytopenia.     Plan   -  Blood cx off PICC line (per  team prefer no peripheral blood cultures)  - hold off on repeat lactate draws if able unless clinical changes  -  The Hematology/Oncology primary team was able to be reached and they are in agreement with the above plan.  -  Disposition: The patient will remain on the current unit. We will continue to monitor this patient closely.  -  Reassessment and plan follow-up will be performed by the primary team      Jena Guzman PA-C  [facility] RRT [facility] Job Code Contact #[phone]    Hospital Course   Brief Summary of events leading to rapid response:   Hypothermia, and tachycardia (120s) triggered RN-guided lactate draw that returns 3.5    Patient states she is still feeling very weak and is no longer nauseated and denies any other signs of infection    Denies pain including in the abdomen, N/V.   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  CV: tachycardic, RRR, no m/r/g  PULM: CTAB, nonlabored breathing  HEENT: NC,  AT      Significant Results and Procedures   Lactic Acid:   Recent Labs   Lab Test 07/24/21  1051 07/23/21  1501 07/23/21  0905   LACT 3.5* 2.5* 2.3*     CBC:   Recent Labs   Lab Test 07/24/21  0649 07/23/21  0630 07/22/21  0619   WBC 0.4* 0.6* 1.0*   HGB 7.1* 7.8* 7.7*   HCT 22.3* 24.3* 24.2*   PLT 28* 56* 74*        Sepsis Evaluation   The patient is not known to have an infection.  NO EVIDENCE OF SEPSIS at this time.  Vital sign, physical exam, and lab findings are due to malignancy on chemo.

## 2021-07-25 NOTE — PROGRESS NOTES
Sepsis Evaluation Progress Note    I was called to see Heather Aden due to abnormal vital signs triggering the Sepsis SIRS screening alert. She is not known to have an infection.     Physical Exam   Vital Signs:  Temp: (!) 95.6  F (35.3  C) Temp src: Oral BP: (!) 151/63 Pulse: 111   Resp: 28 SpO2: 99 % O2 Device: None (Room air)       General: chronically ill appearing  Mental Status: AAOx4.     Data   Lactic Acid   Date Value Ref Range Status   07/25/2021 3.0 (H) 0.7 - 2.0 mmol/L Final   07/24/2021 3.5 (H) 0.7 - 2.0 mmol/L Final     Comment:     Significant Value Results on July 24, 2021 / 11:40 AM have been read back and acknowledged by Atiya Hardwick RN 7D       Assessment & Plan   NO EVIDENCE OF SEPSIS at this time.  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 We will continue to monitor this patient closely..  TAMIKO Medina    Sepsis Criteria   Sepsis: 2+ SIRS criteria due to infection  Severe Sepsis: Sepsis AND 1+ new sign of acute organ dysfunction (Note: lactate >2 or acute encephalopathy each qualify as organ dysfunction)  Septic Shock: Sepsis AND hypotension despite volume resuscitation with 30 ml/kg crystalloid or lactate >=4  Note: HYPOTENSION is defined as 2 BP readings measured 3 hrs apart that have a SBP <90, MAP <65, or decrease >40 mmHg, occurring 6 hrs before or after t-zero

## 2021-07-25 NOTE — PLAN OF CARE
Time: [id number]    Afebrile with HR in 120-30s, tele strip reads sinus tachycardia. Disoriented to year/month. Endorses nausea, declined interventions. Denies pain/SOB. Expressed exhaustion and unwillingness to participate in ADLs. Voided into bedside commode after much encouragement. Small amount of BM, considered a smear although formed. Lymphedema wraps on BLE. 1 episode of moaning without using call light, redirectable with sips of water. Ax1 with gait belt and walker. PICC heparin locked with good blood return.

## 2021-07-25 NOTE — PLAN OF CARE
"BP (!) 140/49 (BP Location: Right arm)   Pulse (!) 129   Temp (!) 96.6  F (35.9  C) (Axillary)   Resp 20   Ht 1.626 m (5' 4\")   Wt 66.5 kg (146 lb 11.2 oz)   SpO2 96%   BMI 25.18 kg/m      VSS ex. HR (100-130) on RA. Disoriented to time, withdrawn, and lethargic. Pt denied SOB, nausea, or pain. Pt refused meds multiple times but did take AM meds late. Pt. encouraged to get OOB to commode. UOP of 400 mL during shift. Medium loose BM after lactulose given x2, BS + x4. A2 with GB/walker to commode. Pt. having L. leg weakness and dizziness, MD ordered 500 mL LR bolus. Purewick put in place after pt was unable to transfer to commode. Pt voided 400 mL output during shift with one medium loose BM. PO output has been poor and fluids encouraged.  L. PICC HL x2.   Pt. triggered SIRS protocol d/t elevated pulse and respirations. Lactate drawn and resulted at 3.0. RRT called and evaluated patient, pt is not septic at this time.     Order for miralax to be used only if patient refuses lactulose BID. See MAR note and PA note. Tele order discontinued today. Psych/palliative consult in place for 7/26.    Will continue to monitor.  "

## 2021-07-25 NOTE — PLAN OF CARE
[id number]  AVSS, alert and oriented x 4, agnes pain/sob. Nausea intermittent, refuse to take meds/intervention. Pt refuse to take oral antibiotic, phoslo.  Hemoglobin 7.1, given RBC at primo shift, with no transfusion reaction. Up with 1 assist, gait belt and walker to bedside commode, pt refusing to urinate. Voided twice, bladder scan 608 ml, post bladder scan void 450 ml. On bed alarm for safety.  Continue to monitor care.

## 2021-07-25 NOTE — PROGRESS NOTES
Hematology / Oncology  Daily Progress Note   Date of Service: 07/25/2021  Patient: Heather Aden  MRN: [id number]  Admission Date: 7/17/2021  Hospital Day # 8    Assessment & Plan:   Heather Aden is a 76 year old female with history of chronic HCV c/b compensated liver cirrhosis and splenomegaly, Hep B exposure, B12 deficiency, and aortic sclerosis with insufficiency who initially presented to [facility] with syncope and dizziness. Was found to have leptomeningeal enhancement on brain MRI and pancytopenia. 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 She was transferred to [facility] for further workup and mgmt of B-ALL. Started modified PETHEMA (Day 1=7/21), dose reduced for liver function. LP flow negative on 7/19. Complicated by worsening liver function, DIC without evidence of bleed, severe malnutrition, medication non-compliance, and deconditioning.     Plan for today  - Today is Day 5 modified PETHEMA   - Pt felt dizzy upon standing, orthostatics negative, sinus tachycardic. Intake was very poor. Given 500 LR to be given slowly.   - Lactulose 20mg BID, goal is ~ 3 BMs per day. If pt refuses lactulose, give two packs miralax twice daily   - Patient is refusing meds, cares, and ADLs. Will consult Health Psych vs Palliative Care 7/26 for depression, GOC, coping. Will consider starting an selective serotonin reuptake inhibitor. Hold Day 8 PETHEMA unless patient starts complying with cares as her symptoms will worsen if she is refusing medications.   - Continue Levaquin 750mg daily due to OSH CT findings 7/13 concerning for possible pneumonia (7/19-7/25). Will decrease to ppx levaquin 250mg daily starting 7/26  - Continue phoslo, increased to 2001mg TID  - Follow coags q12h, conditional orders for cryo if fgn <100. 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  Ultimately found to have progressive pancytopenia compared to prior, with worsening anemia and leukopenia. She had background leukopenia and thrombocytopenia, likely related to chronic liver disease. 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 [place] [facility] [place] [facility], BCR/ABL1 rearrangement absent. Baseline performance status over the last few months has been 3. Dr. Washington will be her primary Heme Malignancy provider.   - [place] [facility] Complex Karyotype 6-72,XXX,<3N>,+1,-2,-3,-4,add (6)(q12),-8,add(11)(p11.2),-17,-18,+6-9mar,inc[cp2]/46,XX[7], near triploid karyotype with numerous abnormalities. Consistent with poor risk cytogenetics  - [facility] placed 7/18  - Pre-phase with steroids; Dexamethasone 20mg q12h 7/18-7/19. Decreased to 20mg once daily 7/20 and 7/21. Discontinued, will follow steroid regimen per modified PETHEMA protocol as below   - Repeat peripheral flow 7/19 - 10% abnormal B-lymphoblasts. CD20 negative  - Given her underlying cirrhosis and age she is not a transplant candidate, and would not likely be able to tolerate an intensive regimen (especially one with asparaginase). Will start modified PETHEMA per the PETHEMA ALL-96 trial, 25% dose reduced for liver disease (TBili 1.5 on Day 1). If Tbili >3 will require further dose-adjustments  - If PETHEMA is poorly tolerated and/or liver function worsens significantly, will consider switching to blinatumomab   - Patient is refusing medications and ADLs. Hold Day 8 chemotherapy (7/28) unless patient begins to comply as her symptoms will worsen if she continues to refuse medications       Treatment Plan: Modified PETHEMA (C1D1=7/21/21)   - Vincristine 2mg once daily Days 1,8,15,22   - Daunorubicin 22.5mg/m2 (37mg) once daily Days 1,8,15,22   - Prednisone 50mg BID Days 1-27, 20mg BID Days 28-35   - Supportive medications: Zofran 16mg once daily Days 1,8,15,22    # CNS ppx   - LP w/ IT Methotrexate 7/19 - flow  negative.  - Per the chemo protocol as above she would receive triple therapy IT chemo on Days 1 and 28. Will cancel day 1 triple therapy IT chemo as she already received IT methotrexate and flow is negative.   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 Increased to 1334mg TID x7/21, further increased to 2001mg TID x7/24   - Phos remains elevated primarily because she is skipping doses, continue to encourage her to take medications. Follow Phos daily   - Additional correction of electrolyte abnormalities (K+, Ca++) PRN per usual means.  - Follow TLS labs once daily    # DIC  # Hypofibrinogenemia   - She has developing DIC without e/o bleed, fibrinogen trending down (155 on 7/21). Start Vit K 10mg po x3 days (7/21-7/23)   - (7/23) Fibrinogen 99, given Cryo (5u)   - Follow coagulation labs Q12h, conditional orders for cryo if fibrinogen <100    GI  #HCV w/ compensated cirrhosis  #HBV exposed, not active infection  #Hyperbilirubinemia  #Mild transaminitis  Patient with known HCV infection, and previous HBV exposure (surface Ab positive, core Ab positive, surface Ag negative). Imaging and labs with mild coagulopathy, splenomegaly and thrombocytopenia prior to ALL diagnosis all consistent with cirrhosis/advanced fibrosis. MELD score 14. Plan was for repeat imaging and AFP Q6 months. Recently started treatment with sofosbuvir/velpatasvir for HCV infection, although at [place] this was on hold, reportedly per Dr. Leventhal. There is no particular interaction between sofosbuvir/velpatasvir and chemo, although absorption may be reduced with PPI (which she should be on while on Dex).  - HBV DNA negative 7/19  - Hepatology consulted 7/19. Appreciate  recommendations;   - Abdominal ultrasound with doppler 7/20- see detailed report   - Resume sofosbuvir-velpatasvir (epclusa) 7/19- continue for a total of 12 weeks (ie ~10/19/21)   - Recommend starting lamivudine along with chemotherapy as HBV ppx, this has a higher rate of resistance but is favored over tenofovir and entacavir when not needed long term (<1 year). Started lamivudine 100mg daily 7/20, continue through chemotherapy  - Follow up in liver clinic in 4-6 weeks, not yet arranged  - TBili significantly up-trended s/p chemotherapy (1.5 on 7/21 ? 2.3 on 7/22). Follow closely. Recheck HBV DNA if continues to worsen  - Follow CMP and coags    # Esophageal Varices, non-bleeding. S/p banding x2 on 7/21/21  In preparation for chemotherapy and history as above, patient underwent EGD with Hepatology on 7/21. During the EGD she was noted to have large esophageal varices with no active bleed. S/p banding x2 for prophylaxis. No complications  - continue regular diet  - F/u EGD in 6 weeks, ~ 8/1/21 (outpatient liver team to arrange)    ID   # Concern for community-acquired pneumonia  # Lactic acidosis  CT Chest at OSH 7/13/21 with small areas of ill-defined nodular opacities at the RUL and RML, probably post infectious/inflammatory in etiology. She has been afebrile on admission and denies cough, but has required 1L intermittently and endorses dyspnea on exertion. Previously not on O2.   - Levaquin 750mg once daily, continue for 7 days total (7/19-7/25)  - Lactic acid 2-3.5. No other infectious symptoms, low suspicion for sepsis. Likely due to underlying leukemia and liver disease  - Limit IVF boluses due to known pleural effusion and risk for pulmonary edema    # Mild Hypothermia  Temp is consistently in the 96's F. No evidence of infections, BCx NGTD. MELD score 14. Etiology unclear.    - Continue to monitor    # ID PPX  - ACV 400mg BID (HSV 1+/2+)  - Levaquin increased to 750mg daily due to possible pneumonia, see  above  - IV Micafungin 50mg daily. After completion of chemotherapy will de-escalate to ppx fluconazole if ANC <1000  - Nebulized pentamidine for PJP ppx given 7/19/21. Continue monthly, next pentamidine dose due ~ 8/19/21    NEURO/PSYCH  # Disorientation, restlessness -- resolved   # Forgetfulness   Patient has been increasingly restless and disoriented overnight 7/18, and 7/19. She is forgetful and sometimes nonsensical. No aphasia. Symptoms generally resolve by mid-morning. No other neurodeficits.   - Could be medication related, she has been receiving dex 20mg BID. Decreased to once daily qam on 7/20.   - Query if related to her liver disease. No asterixis. Per pt report she is having <1BM per day.   - Per Hepatology, an ammonia level would not be helpful in this setting as it is a clinical diagnosis, but would recommend starting lactulose  - Started Lactulose 20mg daily x7/20. Goal is to have 3BMs per day. Increased to 20mg BID x7/25 - hold or decrease dose if >3 BM/day   - If patient declines lactulose, ordered miralax 2 packet BID  - Disorientation and restlessness significantly improved since 7/21. Continues to be forgetful    # Concern for depression  # Withdrawal from cares, ADLs  # Medication Non-compliance  Patient has been declining most of her medications and ADLs, requiring significant encouragement from nursing. These symptoms have gradually worsened since admission. She admits to starting to feel depressed, and really just wants to feel better and to be home with family for as long as possible. She says she wants to continue treatment, but is declining medications and simple tasks such as getting up to the commode.   - Continue lactulose as above  - Will consult Health Psych vs Palliative Care on 7/26  - Consider starting an selective serotonin reuptake inhibitor  - Continue GOC discussions    # Meningeal enhancement  Nonspecific homogeneous meningeal enhancement noted on MRI brain at OSH on  7/14/21.  - LP flow negative on 7/19. No further workup needed, likely benign    # Acute on Chronic Insomnia  She has history of chronic insomnia, acutely worse in the setting of steroids and acute illness. Pt reports that melatonin doesn't help.  - Trazodone 50mg at bedtime prn      PULM  # Dyspnea on exertion  # Mild to moderate left pleural effusion  # Atelectasis  # Hypoxia --resolved  CT PE at OSH on 7/13 with mild to moderate left basilar pleural effusion and compressive atelectasis. Also with possible inflammation/infection of the RUL and RML. She has remained afebrile. Was started on 1L oxygen at OSH, continued on admission. She endorses dyspnea on exertion, no cough. She is a nonsmoker, no history of lung disease. Etiology of pleural effusion is unclear, could be related to infection vs related to her cirrhosis which would be less likely.   - Antibiotics as above.   - Caution with IVF  - Incentive spirometry encouraged  - Most recently on room air with rest and limited activity    CV  # Sinus tachycardia  Baseline heart rate unclear per chart review. HR up to 140s. EKG with sinus tachycardia. No chest pain or dyspnea at rest. Could be multifactorial in the setting of leukemia, steroids, and borderline hypovolemia secondary to poor PO fluid intake. Echo 7/17/21 - EF 55-60%, otherwise normal.   - Decreased pre-phase steroids with slight improvement x7/20  - (7/21) Patient continues to have sinus tachy 130s-140bpm. Unresponsive to fluids. No evidence of sepsis or bleed. BCx NGTD. Low suspicion for PE d/t negative CT Pulm Angio on 7/13 and pt is on room air. Ordered med/surg telemetry, ok to stay on 7D at this time  - limit IVF boluses due to known pleural effusion and risk for pulmonary edema    # LE edema  Patient receiving intermittent IVF, last given 500cc on 7/21AM. Now with 1+ LE edema x7/22. Her weight is stable and she is urinating well, not all intake and output is being reported. Pt is a poor  historian, unclear how much fluids she is drinking but suspect it is limited. No dyspnea noted.   - Hold lasix at this time and limit IVF   - Lymphedema consulted  - Will continue to monitor     # HTN. Continue amlodipine 5mg daily  # Aortic sclerosis/insufficiency. Moderate AI seen on recent TTE. Continue to monitor    FEN/Lytes  #B12 deficiency  #Low-normal folate  Noted to have B12 of 127 with folate of 4 on admission at [facility]. Will plan to continue with parenteral B12. Will hold off on folate replacement in the setting of IT methotraxate.               - Started oral B12 (per pt preference) 1000mcg daily x 7 days (7/18-7/24), weekly x 1 month (7/25-8/22/21), monthly thereafter              - No folate replacement    # Hypermagnesemia  Mag trending up. She is not on any medications that can cause hypermagnesemia. Query if it is related to her liver disease.   - Follow Mag daily     # Severe malnutrition in the context of acute on chronic illness  Patient not eating well for several weeks to months PTA.   - RDAT. Supplements ordered though patient doesn't like the inpatient ensure. Her  brought strawberry-flavored protein supplements from home.  - She is only eating about 1 meal per day despite being on steroids for ALL. Will try to hold off on TPN as able and continue to encourage small frequent meals.   - RD following  - Follow weights    FEN:  - Encouraged PO intake. Bolus fluids PRN  - PRN lyte replacement  - RDAT     Prophy/Misc:  - VTE: SCDs only. Held for DIC, moderate thrombocytopenia, and high risk for bleed with liver disease and known varices  - GI/PUD: PPI while on steroids  - Bowels: Senna and Miralax PRN (2 pack miralax BID if pt declines lactulose)  - Activity:    # Deconditioning- Pt severely deconditioned. Encouraged acitivity as tolerated. PT/OT consulted  - Lines: LUE PICC x7/18    Consults: Hepatology (signed off), PT/OT, CAPS team for LP  CODE: Full code  Disposition: Admit to hospital  "for mgmt of new B-ALL. Will be inpatient for 4-6 weeks  Follow up: Patient has established care with Dr. Washington. Will arrange follow up closer to known discharge date.     Patient was seen and plan of care was discussed with attending physician Dr. Padmini Rosenbaum, LESLIE  Hematology/Oncology  Pager # [phone]  ___________________________________________________________________    Subjective & Interval History:    Day 5 modified PETHEMA. Again feels exhausted and doesn't feel like doing anything. She doesn't like how the medicines taste so she has been declining them. Feels nauseous as well but refusing anti-emetics. She states that she tried to drink ensure but barely had any. Did not eat anything yesterday. Her appetite has been poor for quite some time. Withdrawn, quiet, doesn't want to partake in discussions regarding her health. She has been bladder scanned for significant volume several times, requires prompting to get up to use the commode but once she gets the commode she is able to urinate well. Only having a few BM smears. Remains stable on room air at rest. No cough or sore throat. A comprehensive review of systems was reviewed with the patient and the pertinent positives are listed in the HPI above.  Discussed the plan with the patient today and reviewed the chemo regimen. Supportive  at bedside. She was strongly encouraged to take her medications, especially lactulose.     Physical Exam:    Blood pressure (!) 140/49, pulse (!) 129, temperature (!) 96.6  F (35.9  C), temperature source Axillary, resp. rate 20, height 1.626 m (5' 4\"), weight 66.5 kg (146 lb 11.2 oz), SpO2 96 %.    General: alert and cooperative thin female, lying in bed, no acute distress. Occasional word-finding difficulties but no aphasia   HEENT: sclera anicteric, EOMI, MMM  Neck: supple, normal ROM  CV: Tachycardic, regular rhythm with rare aberrant rhythm (PAC), normal S1/S2, no m/r/g  Resp: Diminished in left " base, no wheezing/crackles, normal respiratory effort on ambient air  GI: slightly firm, non-tender, non-distended, bowel sounds present and normoactive  MSK: warm and well-perfused, no edema. Decreased tone. 1+ LE edema bilaterally  Skin: no rashes on limited exam, no jaundice. Scattered ecchymoses on arms  Neuro: AOx3, moves all extremities equally, no focal deficits. Essential tremor noted in her hands. No asterixis  LUE PICC C/D/I without erythema    Labs & Studies: I personally reviewed the following studies:  ROUTINE LABS (Last four results):  CMP  Recent Labs   Lab 07/25/21  0616 07/24/21 2021 07/24/21  0649 07/23/21  1737 07/23/21 0630 07/22/21  0619     --  137  --  137 137   POTASSIUM 4.6  --  4.6  --  4.4 4.3   CHLORIDE 107  --  106  --  106 107   CO2 23  --  22  --  21 21   ANIONGAP 7  --  9  --  10 9   *  --  189*  --  199* 194*   BUN 42*  --  43*  --  41* 37*   CR 0.78  --  0.86  --  0.86 0.76   GFRESTIMATED 75  --  66  --  66 77   CEASAR 8.4*  --  8.7  --  8.3* 7.7*   MAG 2.8*  --  2.8*  --  3.0* 2.8*   PHOS 5.0* 5.2* 5.4* 5.0* 5.0* 5.1*   PROTTOTAL 4.9*  --  4.8*  --  5.0* 5.1*   ALBUMIN 2.4*  --  2.4*  --  2.4* 2.5*   BILITOTAL 3.1*  --  3.0*  --  2.6* 2.3*   ALKPHOS 73  --  72  --  68 65   AST 86*  --  93*  --  96* 89*   ALT 39  --  37  --  38 33     CBC  Recent Labs   Lab 07/25/21  0616 07/24/21  0649 07/23/21 0630 07/22/21  0619   WBC 0.6* 0.4* 0.6* 1.0*   RBC 2.82* 2.28* 2.46* 2.42*   HGB 8.9* 7.1* 7.8* 7.7*   HCT 26.5* 22.3* 24.3* 24.2*   MCV 94 98 99 100   MCH 31.6 31.1 31.7 31.8   MCHC 33.6 31.8 32.1 31.8   RDW 19.0* 20.6* 20.8* 21.7*   PLT 23* 28* 56* 74*     INR  Recent Labs   Lab 07/25/21  0616 07/24/21  2021 07/24/21  0649 07/23/21  1737   INR 1.36* 1.37* 1.45* 1.54*       Microbiology  Recent Labs   Lab 07/24/21  1051 07/23/21  1501 07/23/21  0905 07/21/21  0641   LACT 3.5* 2.5* 2.3* 3.6*       Pertinent Imaging    Medications list for reference:  Current  Facility-Administered Medications   Medication     0.9% sodium chloride BOLUS     acetaminophen (TYLENOL) tablet 650 mg     acyclovir (ZOVIRAX) tablet 400 mg     albuterol (PROAIR HFA/PROVENTIL HFA/VENTOLIN HFA) 108 (90 Base) MCG/ACT inhaler 1-2 puff     albuterol (PROVENTIL) neb solution 2.5 mg     albuterol (PROVENTIL) neb solution 2.5 mg    And     pentamidine (NEBUPENT) neb solution 300 mg     allopurinol (ZYLOPRIM) tablet 300 mg     amLODIPine (NORVASC) tablet 5 mg     calcium acetate (PHOSLO) capsule 2,001 mg     cyanocobalamin (VITAMIN B-12) tablet 1,000 mcg     [START ON 8/17/2021] cytarabine (PF) (CYTOSAR) 30 mg, hydrocortisone sodium succinate PF (solu-CORTEF) 20 mg, methotrexate (PF) 12 mg in sodium chloride (PF) 0.9% PF 6 mL intrathecal inj (PF)     DAUNOrubicin HCl (CERUBIDINE) 37 mg in sodium chloride 0.9 % 62 mL infusion     diphenhydrAMINE (BENADRYL) injection 50 mg     EPINEPHrine PF (ADRENALIN) injection 0.3 mg     famotidine (PEPCID) infusion 20 mg     heparin lock flush 10 UNIT/ML injection 5-20 mL     heparin lock flush 10 UNIT/ML injection 5-20 mL     lactulose (CHRONULAC) solution 20 g     lamiVUDine (EPIVIR) tablet 100 mg     [START ON 7/26/2021] levofloxacin (LEVAQUIN) tablet 250 mg     levofloxacin (LEVAQUIN) tablet 750 mg     lidocaine (LMX4) cream     lidocaine 1 % 0.1-1 mL     LORazepam (ATIVAN) tablet 0.5-1 mg    Or     LORazepam (ATIVAN) injection 0.5-1 mg     melatonin tablet 10 mg     meperidine (DEMEROL) injection 25 mg     methylPREDNISolone sodium succinate (solu-MEDROL) injection 125 mg     micafungin (MYCAMINE) 50 mg in sodium chloride 0.9 % 100 mL intermittent infusion     naloxone (NARCAN) injection 0.2 mg     No rectal suppositories if WBC less than 1000/microliters or platelets less than 50,000/ L     omeprazole (priLOSEC) CR capsule 20 mg     ondansetron (ZOFRAN) injection 8 mg    Or     ondansetron (ZOFRAN-ODT) ODT tab 8 mg    Or     ondansetron (ZOFRAN) tablet 8 mg      ondansetron (ZOFRAN) tablet 16 mg     polyethylene glycol (MIRALAX) Packet 17 g     [START ON 8/17/2021] predniSONE (DELTASONE) 25 mg     predniSONE (DELTASONE) tablet 50 mg     prochlorperazine (COMPAZINE) tablet 5 mg    Or     prochlorperazine (COMPAZINE) injection 5 mg     senna-docusate (SENOKOT-S/PERICOLACE) 8.6-50 MG per tablet 1 tablet    Or     senna-docusate (SENOKOT-S/PERICOLACE) 8.6-50 MG per tablet 2 tablet     sodium chloride (OCEAN) 0.65 % nasal spray 1 spray     sodium chloride (PF) 0.9% PF flush 10-20 mL     sodium chloride (PF) 0.9% PF flush 10-20 mL     sodium chloride (PF) 0.9% PF flush 10-40 mL     sodium chloride (PF) 0.9% PF flush 10-40 mL     sodium chloride (PF) 0.9% PF flush 3 mL     sodium chloride (PF) 0.9% PF flush 3 mL     sofosbuvir-velpatasvir (EPCLUSA) per tablet 1 tablet     traZODone (DESYREL) tablet 50 mg     vinCRIStine (ONCOVIN) 2 mg in sodium chloride 0.9 % 30 mL infusion

## 2021-07-25 NOTE — PROVIDER NOTIFICATION
Pt triggered SIRS d/t pulse and respirations. Lactate resulted at 3.0. RRT called and evaluated patient.  VSS ex. intermittent tachycardia and htn. Pt denied SOB, nausea, and pain. Continue to monitor.

## 2021-07-26 NOTE — PROGRESS NOTES
CLINICAL NUTRITION SERVICES - REASSESSMENT NOTE     Nutrition Prescription    RECOMMENDATIONS FOR MDs/PROVIDERS TO ORDER:  - Recommend starting pt on an appetite stimulant   - Recommend trialing Reglan to help with treating pt's hiccups.    Malnutrition Status:    - Severe malnutrition in the context of acute illness    Recommendations already ordered by Registered Dietitian (RD):  - Enter prn order for Magic Cup (orange flavor) per pt's request.    Future/Additional Recommendations:  - Monitor po adequacy and need for nutritional support if consuming < 75% of her estimated needs (1200 calories and ~ 60 g PRO)     .     EVALUATION OF THE PROGRESS TOWARD GOALS   Diet: Regular with Ensure Enlive with meals    Intake: Per review of Room Service, pt ordering < 2 meals per day over the past week with intakes ranging betw 25 to 50% of meals when pt eats. Ave nutritional value of meals ordered over the past 3 days = 972 kcals and 15 g PRO.   - Per visit with pt this afternoon, reports she doesn't have an appetite and has also been experiencing hiccups.        NEW FINDINGS   Provider ordered calorie counts starting 7/27 x 3 days    Weight: 66.5 kg (7/25), 63.7 kg (7/19); Suspect wt gain over past week d/t fluids.      Labs:  PO4 > normal ore past week. Per Provider note, reported that pt is skipping doses of phoslo.      MALNUTRITION  % Intake: </= 50% for >/= 5 days (severe)  % Weight Loss: None noted  Subcutaneous Fat Loss: Facial region: Moderate  Muscle Loss:Temporal: Moderate and Thoracic region (clavicle, acromium bone, deltoid, trapezius, pectoral): Moderate  Fluid Accumulation/Edema: Moderate  Malnutrition Diagnosis: Severe malnutrition in the context of acute illness    Previous Goals   Patient to consume % of nutritionally adequate meal trays TID, or the equivalent with supplements/snacks.    Evaluation: Not met    Previous Nutrition Diagnosis  Inadequate oral intake related to decreased appetite as  evidenced by recent weight loss and self report     Evaluation: Declining    CURRENT NUTRITION DIAGNOSIS  Inadequate oral intake related to poor appetite, hiccups, question depression) as evidenced by pt consuming < 50% of her meal intakes over the past 5 days.      INTERVENTIONS  Implementation  Nutrition Education -   Medical food supplement therapy    Goals  1. Ave po intakes per calorie counts x 3 days to meet at least 75% of her estimated nutritional needs (1200 calories and ~ 60 g PRO)       Monitoring/Evaluation  Progress toward goals will be monitored and evaluated per protocol.        Carrie Duffy RD,LD  7D pager [phone]

## 2021-07-26 NOTE — PLAN OF CARE
"  Problem: Adult Inpatient Plan of Care  Goal: Absence of Hospital-Acquired Illness or Injury  Outcome: Change based on patient need/priority     Problem: Adult Inpatient Plan of Care  Goal: Optimal Comfort and Wellbeing  Outcome: Change based on patient need/priority     Pt had an extra large BM tonight. Needed assist of 2 staffs to change this patient due to incontinence episode. HR continues to be high ranging from 110 - 130's. No sign of respiratory distress. Denies pain. Pt is sleeping at this time. /78 (BP Location: Right arm)   Pulse 118   Temp (!) 95.8  F (35.4  C) (Oral)   Resp 22   Ht 1.626 m (5' 4\")   Wt 66.5 kg (146 lb 11.2 oz)   SpO2 98%. Will monitor.   "

## 2021-07-26 NOTE — PROVIDER NOTIFICATION
07/25/21 1900   Call Information   Date of Call 07/25/21   Time of Call 1713   Name of person requesting the team Jeramie   Title of person requesting team RN   RRT Arrival time 1716   Time RRT ended 1725   Reason for call   Type of RRT Adult   Primary reason for call Sepsis suspected   Sepsis Suspected Elevated Lactate level;Heart Rate > 100;RR > 20, SaO2 <90% OR increasing O2 need   Was patient transferred from the ED, ICU, or PACU within last 24 hours prior to RRT call? No   SBAR   Situation LA 3.0   Background History of hep C and hep B core Ab pos, HSV, and hx of substance use disorder. admitted from  OSH after syncope and found to be pancytopenic. 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    Notable History/Conditions Cardiac;Cancer;Hypertension   Assessment VSS. pt is resting comfortably   Interventions No interventions   Patient Outcome   Patient Outcome Stabilized on unit   RRT Team   Date Attending Physician notified 07/25/21   Time Attending Physician notified 1713   Lead RN María Elena Bobo   Post RRT Intervention Assessment   Post RRT Assessment Stable/Improved   Date Follow Up Done 07/25/21   Time Follow Up Done 2020   Comments Sleeping comfortably. HR still tachy and primary team is aware.

## 2021-07-26 NOTE — PROGRESS NOTES
Cross-cover:     Patient still having variable heart rates, from low 100's to 139. Regular rhythm per bedside nursing. No clinical changes. Developed plan that if HR is persistently 130 or higher, will repeat EKG. Just received fluid bolus this afternoon. Could consider another if HR trending up and has ongoing poor PO.     Dr. Mis Schulz  Internal Medicine/Pediatrics      This note was created using voice recognition software and may contain minor errors.

## 2021-07-26 NOTE — PLAN OF CARE
End of Shift Summary. See flowsheets for vital signs and detailed assessment.    Changes this shift: Pt very withdrawn and flat. Refuses meds at schedules times. Needs lots of encouragement to eat/does not want to eat or get up to the chair. Had a very large bowel movement overnight, no BM this am.     Plan:  Encourage to participate with POC.

## 2021-07-26 NOTE — PLAN OF CARE
"  Problem: Adult Inpatient Plan of Care  Goal: Optimal Comfort and Wellbeing  Outcome: Change based on patient need/priority     Problem: Nausea and Vomiting (Chemotherapy Effects)  Goal: Fluid and Electrolyte Balance  Intervention: Prevent and Manage Nausea and Vomiting  Recent Flowsheet Documentation  Taken 7/25/2021 2000 by Idalmis Barbosa RN  Nausea/Vomiting Interventions:   antiemetic   aromatherapy utilized     Pt complained of nausea and was given compazine 5 mg IV. Aromatherapy utilized. HR range from 106 - 136. MD was notified of high HR and no order at this time but to monitor. Pt noted to be anxious. Breathing technique encouraged. Pt is resting at this time. Took all medications in apple sauce. Denies pain. Denies numbness or tingling. /64 (BP Location: Right arm)   Pulse (!) 140   Temp (!) 95.7  F (35.4  C) (Oral)   Resp (!) 32   Ht 1.626 m (5' 4\")   Wt 66.5 kg (146 lb 11.2 oz)   SpO2 95%.  Will continue to monitor.   "

## 2021-07-26 NOTE — PROGRESS NOTES
Hematology / Oncology  Daily Progress Note   Date of Service: 07/26/2021  Patient: Heather Aden  MRN: [id number]  Admission Date: 7/17/2021  Hospital Day # 9    Assessment & Plan:   Heather Aden is a 76 year old female with history of chronic HCV c/b compensated liver cirrhosis and splenomegaly, Hep B exposure, B12 deficiency, and aortic sclerosis with insufficiency who initially presented to [facility] with syncope and dizziness. Was found to have leptomeningeal enhancement on brain MRI and pancytopenia. 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 She was transferred to [facility] for further workup and mgmt of B-ALL. Started modified PETHEMA (Day 1=7/21), dose reduced for liver function. LP flow negative on 7/19. Complicated by worsening liver function, DIC without evidence of bleed, severe malnutrition, medication non-compliance, and deconditioning.       TODAY:  - Today is Day 6 modified PETHEMA   - Mood down, open to pall care consult and trial Lexapro 10 mg daily   - Continue to encourage participation in cares. PO intake and work with PT/OT.   - Discussed with patient and her family that if she is not able to participate in cares and if she has poor functional status will then Hold Day 8 PETHEMA   - Continue phoslo, increased to 2001mg TID  - Follow coags q12h, conditional orders for cryo if fgn <100. 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 Ultimately found to have progressive pancytopenia compared to prior, with worsening anemia and leukopenia. She had background leukopenia and thrombocytopenia, likely related to chronic liver disease. 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 FISH processed at [facility]- Ph negative, BCR/ABL1 rearrangement absent. Baseline performance status over the last few months has been 3.   Padmini will be her primary Heme Malignancy provider.   - Cytogenetics from [facility]: Complex Karyotype 6-72,XXX,<3N>,+1,-2,-3,-4,add (6)(q12),-8,add(11)(p11.2),-17,-18,+6-9mar,inc[cp2]/46,XX[7], near triploid karyotype with numerous abnormalities. Consistent with poor risk cytogenetics  - PICC placed 7/18  - Pre-phase with steroids; Dexamethasone 20mg q12h 7/18-7/19. Decreased to 20mg once daily 7/20 and 7/21. Discontinued, will follow steroid regimen per modified PETHEMA protocol as below   - Repeat peripheral flow 7/19 - 10% abnormal B-lymphoblasts. CD20 negative  - Given her underlying cirrhosis and age she is not a transplant candidate, and would not likely be able to tolerate an intensive regimen (especially one with asparaginase). Will start modified PETHEMA per the PETHEMA ALL-96 trial, 25% dose reduced for liver disease (TBili 1.5 on Day 1). If Tbili >3 will require further dose-adjustments  - If PETHEMA is poorly tolerated and/or liver function worsens significantly, will consider switching to blinatumomab   - Patient occasionally refusing medications and ADLs. Hold Day 8 chemotherapy (7/28) unless patient begins to comply as her symptoms will worsen if she continues to refuse medications        Treatment Plan: Modified PETHEMA (C1D1=7/21/21)    - Vincristine 2mg once daily Days 1,8,15,22   - Daunorubicin 22.5mg/m2 (37mg) once daily Days 1,8,15,22   - Prednisone 50mg BID Days 1-27, 20mg BID Days 28-35   - Supportive medications: Zofran 16mg once daily Days 1,8,15,22    # CNS ppx   - LP w/ IT Methotrexate 7/19 - flow negative.  - Per the chemo protocol as above she would receive triple therapy IT chemo on Days 1 and 28. Will cancel day 1 triple therapy IT chemo as she already received IT methotrexate and flow is negative.   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 Start Vit K 10mg po x3 days (7/21-7/23)   - Follow coagulation labs Q12h, conditional orders for cryo 5u if fibrinogen <100    GI  #HCV w/ compensated cirrhosis  #HBV exposed, not active infection  #Hyperbilirubinemia  #Mild transaminitis  Patient with known HCV infection, and previous HBV exposure (surface Ab positive, core Ab positive, surface Ag negative). Imaging and labs with mild coagulopathy, splenomegaly and thrombocytopenia prior to ALL diagnosis all consistent with cirrhosis/advanced fibrosis. MELD score 14. Plan was for repeat imaging and AFP Q6 months. Recently started treatment with sofosbuvir/velpatasvir for HCV infection, although at [facility] this was on hold, reportedly per Dr. Leventhal. There is no particular interaction between sofosbuvir/velpatasvir and chemo, although absorption may be reduced with PPI (which she should be on while on Dex).  - HBV DNA negative 7/19  - Hepatology consulted 7/19. Appreciate recommendations;   - Abdominal ultrasound with doppler 7/20- see detailed report   - Resume sofosbuvir-velpatasvir (epclusa) 7/19- continue for a total of 12 weeks (ie ~10/19/21)   - Recommend starting lamivudine along with chemotherapy as HBV ppx, this has a higher rate of resistance but is favored over tenofovir and entacavir when not needed long term (<1 year). Started lamivudine 100mg daily 7/20, continue through chemotherapy  - Follow up in liver clinic in 4-6 weeks, not yet arranged  - TBili significantly up-trended s/p chemotherapy (1.5 on 7/21 ? 2.3 on 7/22). Follow closely. Recheck HBV DNA if continues to worsen  - Follow CMP and coags    # Esophageal Varices, non-bleeding. S/p banding x2 on 7/21/21  In preparation for chemotherapy and history as  above, patient underwent EGD with Hepatology on 7/21. During the EGD she was noted to have large esophageal varices with no active bleed. S/p banding x2 for prophylaxis. No complications  - continue regular diet  - F/u EGD in 6 weeks, ~ 8/1/21 (outpatient liver team to arrange)    ID   # Concern for community-acquired pneumonia  # Lactic acidosis  CT Chest at OSH 7/13/21 with small areas of ill-defined nodular opacities at the RUL and RML, probably post infectious/inflammatory in etiology. She has been afebrile on admission and denies cough, but has required 1L intermittently and endorses dyspnea on exertion. Previously not on O2.   - Levaquin 750mg once daily, continue for 7 days total (7/19-7/25)  - Lactic acid 2-3.5. No other infectious symptoms, low suspicion for sepsis. Likely due to underlying leukemia and liver disease  - Limit IVF boluses due to known pleural effusion and risk for pulmonary edema    #Poor dentition   - Monitor daily on exam, high risk for dental infection      # Mild Hypothermia  Temp is consistently in the 96's F. No evidence of infections, BCx NGTD. MELD score 14. Etiology unclear.    - Continue to monitor    # ID PPX  - ACV 400mg BID (HSV 1+/2+)  - Levaquin increased to 750mg daily due to possible pneumonia, see above  - IV Micafungin 50mg daily. After completion of chemotherapy will de-escalate to ppx fluconazole if ANC <1000  - Nebulized pentamidine for PJP ppx given 7/19/21. Continue monthly, next pentamidine dose due ~ 8/19/21     NEURO/PSYCH  # Disorientation, restlessness -- resolved   # Forgetfulness   Patient has been increasingly restless and disoriented overnight 7/18, and 7/19. She is forgetful and sometimes nonsensical. No aphasia. Symptoms generally resolve by mid-morning. No other neurodeficits.   - Could be medication related, she has been receiving dex 20mg BID. Decreased to once daily qam on 7/20.   - Query if related to her liver disease. No asterixis. Per pt report she  is having <1BM per day.   - Per Hepatology, an ammonia level would not be helpful in this setting as it is a clinical diagnosis, but would recommend starting lactulose  - Lactulose 20mg daily started x7/20. Goal is to have ~3BMs per day. Increased to 20mg BID x7/25 - hold or decrease dose if >3 BM/day   - If patient declines lactulose, ordered miralax 2 packet BID    # Depression   # Withdrawal from cares, ADLs  # Medication Non-compliance  Patient has been declining most of her medications and ADLs, requiring significant encouragement from nursing. These symptoms have gradually worsened since admission. She admits to starting to feel depressed, and really just wants to feel better and to be home with family for as long as possible. She says she wants to continue treatment, but is declining medications and simple tasks such as getting up to the commode.   - Continue lactulose as above  - Palliative care consulted, appreciate assistance   - Trial Lexapro 10 mg daily     # Meningeal enhancement  Nonspecific homogeneous meningeal enhancement noted on MRI brain at OSH on 7/14/21.  - LP flow negative on 7/19. No further workup needed, likely benign    # Acute on Chronic Insomnia  She has history of chronic insomnia, acutely worse in the setting of steroids and acute illness. Pt reports that melatonin doesn't help.  - Trazodone 50mg at bedtime prn      PULM  # Dyspnea on exertion  # Mild to moderate left pleural effusion  # Atelectasis  # Hypoxia --resolved  CT PE at OSH on 7/13 with mild to moderate left basilar pleural effusion and compressive atelectasis. Also with possible inflammation/infection of the RUL and RML. She has remained afebrile. Was started on 1L oxygen at OSH, continued on admission. She endorses dyspnea on exertion, no cough. She is a nonsmoker, no history of lung disease. Etiology of pleural effusion is unclear, could be related to infection vs related to her cirrhosis which would be less likely.   -  Antibiotics as above.   - Caution with IVF  - Incentive spirometry encouraged     CV  # Sinus tachycardia  Baseline heart rate unclear per chart review. HR up to 140s. EKG with sinus tachycardia. No chest pain or dyspnea at rest. Could be multifactorial in the setting of leukemia, steroids, and borderline hypovolemia secondary to poor PO fluid intake. Echo 7/17/21 - EF 55-60%, otherwise normal.   - Decreased pre-phase steroids with slight improvement x7/20  - (7/21) Patient continues to have sinus tachy 130s-140bpm. Unresponsive to fluids. No evidence of sepsis or bleed. BCx NGTD. Low suspicion for PE d/t negative CT Pulm Angio on 7/13 and pt is on room air. Ordered med/surg telemetry, ok to stay on 7D at this time  - limit IVF boluses due to known pleural effusion and risk for pulmonary edema    # LE edema  Patient receiving intermittent IVF, last given 500cc on 7/21AM. Now with 1+ LE edema x7/22. Her weight is stable and she is urinating well, not all intake and output is being reported. Pt is a poor historian, unclear how much fluids she is drinking but suspect it is limited. No dyspnea noted.   - Hold lasix at this time and limit IVF   - Lymphedema consulted     # HTN. Continue amlodipine 5mg daily  # Aortic sclerosis/insufficiency. Moderate AI seen on recent TTE. Continue to monitor    FEN/Lytes  #B12 deficiency  #Low-normal folate  Noted to have B12 of 127 with folate of 4 on admission at [facility]. Will plan to continue with parenteral B12. Will hold off on folate replacement in the setting of IT methotraxate.               - Started oral B12 (per pt preference) 1000mcg daily x 7 days (7/18-7/24), weekly x 1 month (7/25-8/22/21), monthly thereafter              - No folate replacement    # Hypermagnesemia  Mag trending up. She is not on any medications that can cause hypermagnesemia. Query if it is related to her liver disease.   - Follow Mag daily     # Severe malnutrition in the context of acute on chronic  "illness  Patient not eating well for several weeks to months PTA.   - RDAT. Supplements ordered though patient doesn't like the inpatient ensure. Her  brought strawberry-flavored protein supplements from home.  - She is only eating about 1 meal per day despite being on steroids for ALL. Will try to hold off on TPN as able and continue to encourage small frequent meals.   - RD following, Rhett counts started x7/27     FEN:  - Encouraged PO intake. Bolus fluids PRN  - PRN lyte replacement    #Deconditioing  Activity: Pt severely deconditioned. Encouraged acitivity as tolerated. PT/OT consulted      Prophy/Misc:  - VTE: SCDs only. Held for DIC, moderate thrombocytopenia, and high risk for bleed with liver disease and known varices  - GI/PUD: PPI while on steroids  - Bowels: Senna and Miralax PRN (2 pack miralax BID if pt declines lactulose)   - Lines: LUE PICC x7/18    Consults: Hepatology (signed off), PT/OT, CAPS team for LP   CODE: Full code  Disposition: Admit to hospital for mgmt of new B-ALL. Will be inpatient for 4-6 weeks  Follow up: Patient has established care with Dr. Washington. Will arrange follow up closer to known discharge date.     Patient was seen and plan of care was discussed with attending physician Dr. Padmini Ca ([facility]), PA-C    Hematology/Oncology   Pager: [phone]      Subjective & Interval History:    Overnight had large BM. No bowel movements yet this AM. Denies pain, nausea or vomiting. Mood is down, low energy levels with chemo and having trouble adjusting to new diagnosis of leukemia and liver dysfunction.  and daughter visit her. Agreeable to take meds today and with with PT. Afebrile and HD stable, asymptomatic from tachycardia.      Physical Exam:    Blood pressure 134/78, pulse (!) 134, temperature (!) 95.3  F (35.2  C), temperature source Oral, resp. rate 22, height 1.626 m (5' 4\"), weight 66.5 kg (146 lb 11.2 oz), SpO2 97 %.    General: alert and " cooperative thin female, lying in bed, no acute distress. Occasional word-finding difficulties but no aphasia   HEENT: sclera anicteric, EOMI, MMM, poor dentition with R lower teeth cracked/black in coloration. Non tender to palpation.   Neck: supple, normal ROM  CV: Tachycardic, regular rhythm with rare aberrant rhythm (PAC), normal S1/S2, no m/r/g  Resp: Diminished in left base, no wheezing/crackles, normal respiratory effort on ambient air  GI: slightly firm, non-tender, non-distended, bowel sounds present and normoactive  MSK: warm and well-perfused, no edema. Decreased tone. 1+ LE edema bilaterally   Skin: no rashes on limited exam, no jaundice. Scattered ecchymoses on arms  Neuro: AOx3, moves all extremities equally, no focal deficits. Essential tremor noted in her hands. No asterixis  LUE PICC C/D/I without erythema    Labs & Studies: I personally reviewed the following studies:  ROUTINE LABS (Last four results):  CMP  Recent Labs   Lab 07/26/21  0633 07/25/21  2130 07/25/21  0616 07/24/21  2021 07/24/21  0649 07/23/21  0630     --  137  --  137 137   POTASSIUM 4.6  --  4.6  --  4.6 4.4   CHLORIDE 109  --  107  --  106 106   CO2 22  --  23  --  22 21   ANIONGAP 5  --  7  --  9 10   *  --  185*  --  189* 199*   BUN 40*  --  42*  --  43* 41*   CR 0.67  --  0.78  --  0.86 0.86   GFRESTIMATED 86  --  75  --  66 66   CEASAR 8.8  --  8.4*  --  8.7 8.3*   MAG 2.6*  --  2.8*  --  2.8* 3.0*   PHOS 5.1* 4.6* 5.0* 5.2* 5.4* 5.0*   PROTTOTAL 4.8*  --  4.9*  --  4.8* 5.0*   ALBUMIN 2.1*  --  2.4*  --  2.4* 2.4*   BILITOTAL 2.8*  --  3.1*  --  3.0* 2.6*   ALKPHOS 78  --  73  --  72 68   AST 76*  --  86*  --  93* 96*   ALT 38  --  39  --  37 38     CBC  Recent Labs   Lab 07/26/21  0633 07/25/21  0616 07/24/21  0649 07/23/21  0630   WBC 0.2* 0.6* 0.4* 0.6*   RBC 2.63* 2.82* 2.28* 2.46*   HGB 8.5* 8.9* 7.1* 7.8*   HCT 25.6* 26.5* 22.3* 24.3*   MCV 97 94 98 99   MCH 32.3 31.6 31.1 31.7   MCHC 33.2 33.6 31.8 32.1    RDW 18.6* 19.0* 20.6* 20.8*   PLT 14* 23* 28* 56*     INR  Recent Labs   Lab 07/26/21  0633 07/25/21  2130 07/25/21  0616 07/24/21 2021   INR 1.30* 1.41* 1.36* 1.37*       Microbiology  Recent Labs   Lab 07/25/21  1657 07/24/21  1051 07/23/21  1501 07/23/21  0905   LACT 3.0* 3.5* 2.5* 2.3*       Pertinent Imaging    Medications list for reference:  Current Facility-Administered Medications   Medication     0.9% sodium chloride BOLUS     acetaminophen (TYLENOL) tablet 650 mg     acyclovir (ZOVIRAX) tablet 400 mg     albuterol (PROAIR HFA/PROVENTIL HFA/VENTOLIN HFA) 108 (90 Base) MCG/ACT inhaler 1-2 puff     albuterol (PROVENTIL) neb solution 2.5 mg     albuterol (PROVENTIL) neb solution 2.5 mg    And     pentamidine (NEBUPENT) neb solution 300 mg     allopurinol (ZYLOPRIM) tablet 300 mg     amLODIPine (NORVASC) tablet 5 mg     calcium acetate (PHOSLO) capsule 2,001 mg     cyanocobalamin (VITAMIN B-12) tablet 1,000 mcg     [START ON 8/17/2021] cytarabine (PF) (CYTOSAR) 30 mg, hydrocortisone sodium succinate PF (solu-CORTEF) 20 mg, methotrexate (PF) 12 mg in sodium chloride (PF) 0.9% PF 6 mL intrathecal inj (PF)     DAUNOrubicin HCl (CERUBIDINE) 37 mg in sodium chloride 0.9 % 62 mL infusion     diphenhydrAMINE (BENADRYL) injection 50 mg     EPINEPHrine PF (ADRENALIN) injection 0.3 mg     escitalopram (LEXAPRO) tablet 10 mg     famotidine (PEPCID) infusion 20 mg     heparin lock flush 10 UNIT/ML injection 5-20 mL     heparin lock flush 10 UNIT/ML injection 5-20 mL     hydrOXYzine (ATARAX) tablet 50 mg     lactulose (CHRONULAC) solution 20 g     lamiVUDine (EPIVIR) tablet 100 mg     levofloxacin (LEVAQUIN) tablet 250 mg     lidocaine (LMX4) cream     lidocaine 1 % 0.1-1 mL     LORazepam (ATIVAN) tablet 0.5-1 mg    Or     LORazepam (ATIVAN) injection 0.5-1 mg     melatonin tablet 10 mg     meperidine (DEMEROL) injection 25 mg     methylPREDNISolone sodium succinate (solu-MEDROL) injection 125 mg     micafungin  (MYCAMINE) 50 mg in sodium chloride 0.9 % 100 mL intermittent infusion     naloxone (NARCAN) injection 0.2 mg     No rectal suppositories if WBC less than 1000/microliters or platelets less than 50,000/ L     omeprazole (priLOSEC) CR capsule 20 mg     ondansetron (ZOFRAN) injection 8 mg    Or     ondansetron (ZOFRAN-ODT) ODT tab 8 mg    Or     ondansetron (ZOFRAN) tablet 8 mg     ondansetron (ZOFRAN) tablet 16 mg     polyethylene glycol (MIRALAX) Packet 34 g     [START ON 8/17/2021] predniSONE (DELTASONE) 25 mg     predniSONE (DELTASONE) tablet 50 mg     prochlorperazine (COMPAZINE) tablet 5 mg    Or     prochlorperazine (COMPAZINE) injection 5 mg     senna-docusate (SENOKOT-S/PERICOLACE) 8.6-50 MG per tablet 1 tablet    Or     senna-docusate (SENOKOT-S/PERICOLACE) 8.6-50 MG per tablet 2 tablet     sodium chloride (OCEAN) 0.65 % nasal spray 1 spray     sodium chloride (PF) 0.9% PF flush 10-20 mL     sodium chloride (PF) 0.9% PF flush 10-20 mL     sodium chloride (PF) 0.9% PF flush 10-40 mL     sodium chloride (PF) 0.9% PF flush 10-40 mL     sodium chloride (PF) 0.9% PF flush 3 mL     sodium chloride (PF) 0.9% PF flush 3 mL     sofosbuvir-velpatasvir (EPCLUSA) per tablet 1 tablet     traZODone (DESYREL) tablet 50 mg     vinCRIStine (ONCOVIN) 2 mg in sodium chloride 0.9 % 30 mL infusion

## 2021-07-26 NOTE — PROGRESS NOTES
"PALLIATIVE CARE SOCIAL WORK Progress Note   [facility] ([place]) Unit 7D    REFERRAL SOURCE: Palliative Care Team    Joint visit with Palliative Care MD, Seema, and her daughter (Yancy). PCSW was asked to join visit due to coping with new diagnosis.   Seema was open to our conversation, but with her eyes closed most of the visit. She was minimally engaged in answering questions and at times Yancy would help her answer. Seema was able to endorse that she is not feeling like herself; physically, emotionally, or mentally. She wasn't able to tell us much about how she feels different, just that she \"doesn't feel good.\" She does feel like things are being explained to her and she understands (most of the time). Yancy reports that she is much different than her normal self and it's hard to watch. Yancy is worried that her mom is \"giving up.\" She was also able to explain that Seema is \"living her worst fear\". Seema's mom  in a nursing home and she's always told her family that she wants to die young so she doesn't suffer.     Attempted to reframe that sometimes the treatment options are difficult for a short time, but have lasting benefits. Also attempted to clarify that if she didn't want to do recommended treatments this would be important to know so we could care for her in the way she would want. Seema would not engage in either of these ideas. She was open to PCSW returning tomorrow morning when her  is present and mornings tend to be the time she is most clear.     Plan: PCSW will continue to follow for goals of care & coping with new diagnosis while Palliative Care Team is following.     ADAM Cowan, [facility]  Palliative Care Clinical   Pager [phone]    [facility] Inpatient Team Consult Pager [phone] Mon-Fri 8-4:30  After hours Answering Service [phone]        "

## 2021-07-27 NOTE — PROGRESS NOTES
Hematology / Oncology  Daily Progress Note   Date of Service: 07/27/2021  Patient: Heather Aden  MRN: [id number]  Admission Date: 7/17/2021  Hospital Day # 10    Assessment & Plan:   Heather Aden is a 76 year old female with history of chronic HCV c/b compensated liver cirrhosis and splenomegaly, Hep B exposure, B12 deficiency, and aortic sclerosis with insufficiency who initially presented to [facility] with syncope and dizziness. Was found to have leptomeningeal enhancement on brain MRI and pancytopenia. 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 She was transferred to [facility] for further workup and mgmt of B-ALL. Started modified PETHEMA (Day 1=7/21), dose reduced for liver function. LP flow negative on 7/19. Complicated by worsening liver function, DIC without evidence of bleed, severe malnutrition, medication non-compliance, and deconditioning.       TODAY:  Family conference today with palliative care, Dr Leigh and patients family. Discussed poor functional status and inability to tolerate further treatment. Decided to transition to comfort cares and will work on home hospice cares facilitation through social work. Appreciate all teams assistance.       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 Ultimately found to have progressive pancytopenia compared to prior, with worsening anemia and leukopenia. She had background leukopenia and thrombocytopenia, likely related to chronic liver disease. 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 FISH processed at [facility]- Ph negative, BCR/ABL1 rearrangement absent. Baseline performance status over the last few months has been 3. Dr. Washington will be her primary [facility] provider.   - Cytogenetics from [facility]: Complex Karyotype 6-72,XXX,<3N>,+1,-2,-3,-4,add  (6)(q12),-8,add(11)(p11.2),-17,-18,+6-9mar,inc[cp2]/46,XX[7], near triploid karyotype with numerous abnormalities. Consistent with poor risk cytogenetics  - PICC placed 7/18  - Pre-phase with steroids; Dexamethasone 20mg q12h 7/18-7/19. Decreased to 20mg once daily 7/20 and 7/21. Discontinued, will follow steroid regimen per modified PETHEMA protocol as below   - Repeat peripheral flow 7/19 - 10% abnormal B-lymphoblasts. CD20 negative  - Given her underlying cirrhosis and age she is not a transplant candidate, and would not likely be able to tolerate an intensive regimen (especially one with asparaginase). Initiated modified PETHEMA (Day 1 = 7/21/21) per the PETHEMA ALL-96 trial, 25% dose reduced for liver disease.   - Patient refusing medications and ADLs. Very withdrawn and clinically declining. Care conference held with patient and her family 7/27, given very poor performance status and inability to participate in cares would not recommend further chemo. Patient and family wish to pursue home hospice.     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 Start Vit K 10mg po x3 days (7/21-7/23)   - discontinue coags with transition to comfort cares     GI  #HCV w/ compensated cirrhosis  #HBV exposed, not active infection  #Hyperbilirubinemia  #Mild transaminitis  Patient with known HCV infection, and previous HBV exposure (surface Ab positive, core Ab positive, surface Ag negative). Imaging and labs with mild coagulopathy, splenomegaly and thrombocytopenia prior to ALL diagnosis all consistent with cirrhosis/advanced fibrosis. MELD score 14. Plan was for repeat imaging and AFP Q6 months. Recently started treatment with sofosbuvir/velpatasvir for HCV infection, although at [place] this was on hold, reportedly per  Dr. Leventhal. There is no particular interaction between sofosbuvir/velpatasvir and chemo, although absorption may be reduced with PPI (which she should be on while on Dex).  - HBV DNA negative 7/19  - Hepatology consulted 7/19. Appreciate recommendations;   - Abdominal ultrasound with doppler 7/20- see detailed report   - Resume sofosbuvir-velpatasvir (epclusa) 7/19- continue for a total of 12 weeks (ie ~10/19/21)   - Recommend starting lamivudine along with chemotherapy as HBV ppx, this has a higher rate of resistance but is favored over tenofovir and entacavir when not needed long term (<1 year). Started lamivudine 100mg daily 7/20, continue through chemotherapy  - Epclusa discontinued with transition to comfort cares     # Esophageal Varices, non-bleeding. S/p banding x2 on 7/21/21  In preparation for chemotherapy and history as above, patient underwent EGD with Hepatology on 7/21. During the EGD she was noted to have large esophageal varices with no active bleed. S/p banding x2 for prophylaxis. No complications    ID   # Concern for community-acquired pneumonia  # Lactic acidosis  CT Chest at OSH 7/13/21 with small areas of ill-defined nodular opacities at the RUL and RML, probably post infectious/inflammatory in etiology. She has been afebrile on admission and denies cough, but has required 1L intermittently and endorses dyspnea on exertion. Previously not on O2.   - Levaquin 750mg once daily, continue for 7 days total (7/19-7/25)  - Lactic acid 2-3.5. No other infectious symptoms, low suspicion for sepsis. Likely due to underlying leukemia and liver disease  - Limit IVF boluses due to known pleural effusion and risk for pulmonary edema     # Mild Hypothermia  Temp is consistently in the 96's F. No evidence of infections, BCx NGTD. MELD score 14. Etiology likely 2/2 underlying liver disease.    - Continue to monitor     NEURO/PSYCH  # Disorientation, restlessness -- resolved   # Forgetfulness   Patient has  been increasingly restless and disoriented overnight 7/18, and 7/19. She is forgetful and sometimes nonsensical. No aphasia. Symptoms generally resolve by mid-morning. No other neurodeficits.   - Could be medication related, she has been receiving dex 20mg BID. Decreased to once daily qam on 7/20.   - Query if related to her liver disease. No asterixis. Per pt report she is having <1BM per day.   - Per Hepatology, an ammonia level would not be helpful in this setting as it is a clinical diagnosis, but would recommend starting lactulose  - Lactulose 20mg daily started x7/20. Goal is to have ~3BMs per day. Increased to 20mg BID x7/25 - hold or decrease dose if >3 BM/day   - If patient declines lactulose, ordered miralax 2 packet BID     # Depression   # Withdrawal from cares, ADLs  # Medication Non-compliance  Patient has been declining most of her medications and ADLs, requiring significant encouragement from nursing. These symptoms have gradually worsened since admission. She admits to starting to feel depressed, and really just wants to feel better and to be home with family for as long as possible. She says she wants to continue treatment, but is declining medications and simple tasks such as getting up to the commode.   - Continue lactulose as above  - Palliative care consulted, appreciate assistance - see above conversations     # Meningeal enhancement  Nonspecific homogeneous meningeal enhancement noted on MRI brain at OSH on 7/14/21.  - LP flow negative on 7/19. No further workup needed, likely benign    # Acute on Chronic Insomnia  She has history of chronic insomnia, acutely worse in the setting of steroids and acute illness. Pt reports that melatonin doesn't help.  - Trazodone 50mg at bedtime prn      PULM  # Dyspnea on exertion  # Mild to moderate left pleural effusion  # Atelectasis  # Hypoxia --resolved  CT PE at OSH on 7/13 with mild to moderate left basilar pleural effusion and compressive atelectasis.  Also with possible inflammation/infection of the RUL and RML. She has remained afebrile. Was started on 1L oxygen at OSH, continued on admission. She endorses dyspnea on exertion, no cough. She is a nonsmoker, no history of lung disease. Etiology of pleural effusion is unclear, could be related to infection vs related to her cirrhosis which would be less likely.   - Antibiotics as above.   - Caution with IVF  - Incentive spirometry encouraged     CV  # Sinus tachycardia  Baseline heart rate unclear per chart review. HR up to 140s. EKG with sinus tachycardia. No chest pain or dyspnea at rest. Could be multifactorial in the setting of leukemia, steroids, and borderline hypovolemia secondary to poor PO fluid intake. Echo 7/17/21 - EF 55-60%, otherwise normal.   - Decreased pre-phase steroids with slight improvement x7/20  - (7/21) Patient continues to have sinus tachy 130s-140bpm. Unresponsive to fluids. No evidence of sepsis or bleed. BCx NGTD. Low suspicion for PE d/t negative CT Pulm Angio on 7/13 and pt is on room air. Ordered med/surg telemetry, ok to stay on 7D at this time  - limit IVF boluses due to known pleural effusion and risk for pulmonary edema    # LE edema  Patient receiving intermittent IVF, last given 500cc on 7/21AM. Now with 1+ LE edema x7/22. Her weight is stable and she is urinating well, not all intake and output is being reported. Pt is a poor historian, unclear how much fluids she is drinking but suspect it is limited. No dyspnea noted.   - Hold lasix at this time and limit IVF   - Lymphedema consulted     # HTN. Continue amlodipine 5mg daily  # Aortic sclerosis/insufficiency. Moderate AI seen on recent TTE. Continue to monitor    # Severe malnutrition in the context of acute on chronic illness  Patient not eating well for several weeks to months PTA.   - RDAT. Supplements ordered though patient doesn't like the inpatient ensure. Her  brought strawberry-flavored protein supplements from  "home.  - She is only eating about 1 meal per day despite being on steroids for ALL. Will try to hold off on TPN as able and continue to encourage small frequent meals.   - RD following, Rhett counts started x7/27 -- discontinued with transition to comfort cares     FEN:  - Encouraged PO intake. Bolus fluids PRN  - PRN lyte replacement    #Deconditioing  Activity: Pt severely deconditioned. Encouraged acitivity as tolerated. PT/OT consulted     CODE: DNR/DNI  Dispo: Discharge on home hospice in 1-2 days.     Patient was seen and plan of care was discussed with attending physician Dr. Padmini Ca ([facility]), PAISMAEL    Hematology/Oncology   Pager: [phone]      Subjective & Interval History:    Overnight no acute events. Denies pain, nausea, vomiting, bleeding or issues with bowels or bladder. Unsure that she wants to pursue further chemo. Wants to be home. Family to visit later today.     Physical Exam:    Blood pressure (!) 145/61, pulse 109, temperature (!) 95.3  F (35.2  C), temperature source Oral, resp. rate 18, height 1.626 m (5' 4\"), weight 66.5 kg (146 lb 11.2 oz), SpO2 97 %.    General: alert and cooperative thin female, lying in bed, no acute distress. Occasional word-finding difficulties but no aphasia   HEENT: sclera anicteric, EOMI, MMM, poor dentition with R lower teeth cracked/black in coloration. Non tender to palpation.   Neck: supple, normal ROM  CV: Tachycardic, regular rhythm with rare aberrant rhythm (PAC), normal S1/S2, no m/r/g  Resp: Diminished in left base, no wheezing/crackles, normal respiratory effort on ambient air  GI: slightly firm, non-tender, non-distended, bowel sounds present and normoactive  MSK: warm and well-perfused, no edema. Decreased tone. 1+ LE edema bilaterally   Skin: no rashes on limited exam, no jaundice. Scattered ecchymoses on arms  Neuro: AOx3, moves all extremities equally, no focal deficits. Essential tremor noted in her hands. No asterixis. "   Psych: Flat affect, withdrawn   LUE PICC C/D/I without erythema    Labs & Studies: I personally reviewed the following studies:  ROUTINE LABS (Last four results):  CMP  Recent Labs   Lab 07/27/21  0629 07/26/21  1625 07/26/21  0633 07/25/21  2130 07/25/21  0616 07/24/21  0649     --  136  --  137 137   POTASSIUM 4.7  --  4.6  --  4.6 4.6   CHLORIDE 104  --  109  --  107 106   CO2 24  --  22  --  23 22   ANIONGAP 7  --  5  --  7 9   *  --  195*  --  185* 189*   BUN 43*  --  40*  --  42* 43*   CR 0.67  --  0.67  --  0.78 0.86   GFRESTIMATED 86  --  86  --  75 66   CEASAR 8.4*  --  8.8  --  8.4* 8.7   MAG 2.6*  --  2.6*  --  2.8* 2.8*   PHOS 5.0* 5.0* 5.1* 4.6* 5.0* 5.4*   PROTTOTAL 4.7*  --  4.8*  --  4.9* 4.8*   ALBUMIN 2.1*  --  2.1*  --  2.4* 2.4*   BILITOTAL 3.3*  --  2.8*  --  3.1* 3.0*   ALKPHOS 83  --  78  --  73 72   AST 61*  --  76*  --  86* 93*   ALT 36  --  38  --  39 37     CBC  Recent Labs   Lab 07/27/21  0629 07/26/21  0633 07/25/21  0616 07/24/21  0649   WBC 0.1* 0.2* 0.6* 0.4*   RBC 2.50* 2.63* 2.82* 2.28*   HGB 8.0* 8.5* 8.9* 7.1*   HCT 23.4* 25.6* 26.5* 22.3*   MCV 94 97 94 98   MCH 32.0 32.3 31.6 31.1   MCHC 34.2 33.2 33.6 31.8   RDW 18.1* 18.6* 19.0* 20.6*   PLT 12* 14* 23* 28*     INR  Recent Labs   Lab 07/27/21  0629 07/26/21  1625 07/26/21  0633 07/25/21  2130   INR 1.46* 1.31* 1.30* 1.41*       Microbiology  Recent Labs   Lab 07/26/21 2014 07/25/21  1657 07/24/21  1051 07/23/21  1501   LACT 2.9* 3.0* 3.5* 2.5*       Pertinent Imaging    Medications list for reference:  Current Facility-Administered Medications   Medication     acetaminophen (TYLENOL) tablet 650 mg     albuterol (PROAIR HFA/PROVENTIL HFA/VENTOLIN HFA) 108 (90 Base) MCG/ACT inhaler 1-2 puff     albuterol (PROVENTIL) neb solution 2.5 mg     diphenhydrAMINE (BENADRYL) injection 50 mg     [START ON 8/2/2021] escitalopram (LEXAPRO) tablet 10 mg     [START ON 7/28/2021] escitalopram (LEXAPRO) tablet 5 mg     heparin lock  flush 10 UNIT/ML injection 5-20 mL     heparin lock flush 10 UNIT/ML injection 5-20 mL     hydrOXYzine (ATARAX) tablet 50 mg     lidocaine (LMX4) cream     lidocaine 1 % 0.1-1 mL     LORazepam (ATIVAN) tablet 0.5-1 mg    Or     LORazepam (ATIVAN) injection 0.5-1 mg     melatonin tablet 10 mg     methylphenidate (RITALIN) tablet 5 mg     naloxone (NARCAN) injection 0.2 mg     No rectal suppositories if WBC less than 1000/microliters or platelets less than 50,000/ L     omeprazole (priLOSEC) CR capsule 20 mg     ondansetron (ZOFRAN) injection 8 mg    Or     ondansetron (ZOFRAN-ODT) ODT tab 8 mg    Or     ondansetron (ZOFRAN) tablet 8 mg     prochlorperazine (COMPAZINE) tablet 5 mg    Or     prochlorperazine (COMPAZINE) injection 5 mg     senna-docusate (SENOKOT-S/PERICOLACE) 8.6-50 MG per tablet 1 tablet    Or     senna-docusate (SENOKOT-S/PERICOLACE) 8.6-50 MG per tablet 2 tablet     sodium chloride (OCEAN) 0.65 % nasal spray 1 spray     sodium chloride (PF) 0.9% PF flush 10-40 mL     sodium chloride (PF) 0.9% PF flush 10-40 mL     traZODone (DESYREL) tablet 50 mg

## 2021-07-27 NOTE — PROGRESS NOTES
Pt was incontinent of urine X 1. Bladder scan done and was 180 ml. Will continue to monitor.    Ismael Hinds(Attending)

## 2021-07-27 NOTE — PROVIDER NOTIFICATION
07/26/21 2000   Call Information   Date of Call 07/26/21   Time of Call 1958   Name of person requesting the team Jeramie Scott   Title of person requesting team RN   RRT Arrival time 1959   Time RRT ended 2027   Reason for call   Type of RRT Adult   Primary reason for call Sepsis suspected   Sepsis Suspected Elevated Lactate level;Heart Rate > 100;BMT/Oncology patient with WBC<0.5 or >12 or ANC <500   Was patient transferred from the ED, ICU, or PACU within last 24 hours prior to RRT call? No   SBAR   Situation Lactic Acid 3.0, WBC 0.2, .   Background Admit for B-ALL.  Hx aortic stenosis, Hep B + C, HSV, substance use d/o.   Notable History/Conditions Cancer;Cardiac;Hypertension   Assessment Afebrile. -130's/regular. Other vss.  Remains in RA with 02 sats upper 90's.  Pt. continues to intermittently refuse cares/meds.  Denies fever, chills, n/v, C.P., SOB.   Interventions No interventions   Patient Outcome   Patient Outcome Stabilized on unit   RRT Team   Attending/Primary/Covering Physician Kingston Flores NP   Date Attending Physician notified 07/26/21   Time Attending Physician notified 1958   Physician(s) Primary Team notified.   Lead RN Charmaine Scott   RT none   Post RRT Intervention Assessment   Post RRT Assessment Stable/Improved   Date Follow Up Done 07/26/21   Time Follow Up Done 2237   Comments pt sleeping.  VSS

## 2021-07-27 NOTE — PLAN OF CARE
[id number]  AVSS, heart rate max 135, Triggered sepsis, sepsis vitals done, Lactic acid is 2.9 Rapid called, MD notified. Pt is not septic at this time. Pt is fatigue and tired today, oreinted to herself and place, disorieonted  to time and date.  Pt refuse to go bathroom, bladder scan 671 ml, get pt to commode, voided 425 ml @ 1800 Urine sample it sent to lab. LBM is yesterday 7/25/21Get up to commode at 2215, did not urinate, did post bladder scan after the using commode, bladder scab 541 ml. MD notified to order fan catheter or straight cath. Did straight cath and got 500 ml out.  Did covid swab this primo shift, waiting for result. INR 1.31 and fibrinogen 119 @ 1425 Pt refuse to eat supper today. Up with 2 assist with walker and gait belt, pt is weak this evening. PICC is intact, good blood return, hep locked. Bed alarm is on for safety..  Continue to monitor care.

## 2021-07-27 NOTE — PROGRESS NOTES
Cross-cover    Retaining 550 ml/s of urine per page. Is neutropenic, but this is quite high degree of retention. Placed prn straight cath order and specified that team should be paged whenever she is cathed. Day team to address in AM.     Dr. Mis Schulz  Internal Medicine/Pediatrics      This note was created using voice recognition software and may contain minor errors.

## 2021-07-27 NOTE — PLAN OF CARE
Pt flat affect, quiet and withdrawn. Pt not wanting to do things. Palliative Social Work came by this afternoon to see pt. Pt wanting to pursue home hospice. Social Work Brittny aware and working on the discharge plan. Family,  and daughter at the bedside. Calorie Count Day 1. Poor appetite and poor oral food intake. Pt had a jello all day for food intake.

## 2021-07-27 NOTE — CONSULTS
Hospice referral received from Brittny MAZARIEGOS.  Pt being worked up for eligibility and availability to accommodate an admission.  Brittny will confirm with [facility] in the morning if hospice should move forward with referral and establish contact w pt/family and set up consult.      Thank you for this referral.    Rani Smiley RN CHPN  [facility]  Referral Specialist  C: [phone]  24 hour line: [phone]

## 2021-07-27 NOTE — PROGRESS NOTES
PALLIATIVE CARE SOCIAL WORK Progress Note   [facility] ([place]) Unit 7D    Follow Up    Attempted visit with Seema this morning. She had just completed her morning meds and was feeling nauseous.     PCSW joined conversation with Palliative Care MD, Oncology Fellow, , & daughter. Family informed that Seema doesn't seem to be able to tolerate further cancer treatments. Medical teams recommended transitioning to comfort approach & discharge with hospice. Family is in agreement and want her home as soon as possible. Family was tearful and appreciate the information. Medical teams updated Seema on the recommendation to focus on her comfort and go home with hospice. She was in agreement with this plan.      Plan: PCSW asked unit SW to assist with Home Hospice referrals. PCSW will continue to be available for patient and family support while Palliative Care Team is following.    ADAM Cowan, [facility]  Palliative Care Clinical   Pager [phone]    [facility] Inpatient Team Consult Pager [phone] Mon-Fri 8-4:30  After hours Answering Service [phone]

## 2021-07-27 NOTE — PROGRESS NOTES
[facility]  Palliative Care Daily Progress Note       Recommendations & Counseling       Transitioned to comfort measures only, DNR/DNI and working toward home hospice discharge.       Stop non comfort medications - pt and family wish to limit unnecessary medications as much as possible      Agree with current comfort care prn comfort medications as ordered      Will need POLST form completed before discharge      Consider adding back the ritalin for mood/energy support (5mg 8 AM, 5mg noon)    If continues to have nausea regularly consider scheduling 5mg ODT olanzapine at bedtime    At discharge recommend including the following standard hospice PRN orders:  - Bisacodyl 10 mg Suppository [place] daily to bid prn constipation  - Tylenol 650 mg suppository PO/KY q4hr prn pain, fever  - Lorazepam  0.5-1.0 mg PO/SL/KY q4h prn anxiety/restlessness  - Atropine sulfate 1% opth solution 1-2 drops SL q2h prn secretions  - Senna 8.6 mg 1-2 tabs PO prn constipation  - Haldol 1-2 mg PO/SL/KY q6h prn nausea, agitation or delirium.   - morphine 20mg/mL solution 5-10mg q 2 hours prn pain or shortness of breath      Total time spent was 60 minutes,  >50% of time was spent counseling and/or coordination of care regarding goals of care, transition to comfort care, nausea and fatigue, hospice planning for patient with ALL, failure to thrive.    Bailey Cam MD / Palliative Medicine / Text me via [facility].           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      Today, the patient was seen for:  ALL  Depressed mood  fatigue    Prognosis, Goals, or Advance Care Planning was addressed today with: Yes.  Mood, coping, and/or meaning in the context of serious illness were  addressed today: Yes.  Summary/Comments:  Reviewed case with oncology - they have concern that even if she continues with chemotherapy her prognosis is poor but since she is too fatigued to participate in most cares at this point they dont think she is a good candidate for chemotherapy anymore and recommend hospice. We reviewed this with her  and daughter and with patient today and they all agree that home hospice is the next appropriate step.             Interval History:     Chart review/discussion with unit or clinical team members:   Pt continues to be fatigued  Reports of nausea, declining medications much of mornign due to nausea    Per patient or family/caregivers today:  Fatigued  Denies pain      Key Palliative Symptoms:  # Pain severity the last 12 hours: none  # Dyspnea severity the last 12 hours: none  # Nausea severity the last 12 hours: low  # Anxiety severity the last 12 hours: moderate    Patient is on opioids: assessed and bowels ok/no needed changes to plan of care today.           Review of Systems:     Besides above, an additional 4 point system ROS was reviewed and is unremarkable          Medications:     I have reviewed this patient's medication profile and medications during this hospitalization.             Physical Exam:   Vitals were reviewed  Temp: (!) 95.9  F (35.5  C) Temp src: Axillary BP: (!) 147/52 Pulse: 109   Resp: 16 SpO2: 93 % O2 Device: None (Room air)    Gen: sitting/lying in bed. Appears comfortable but fatigued  HEENT: NCAT. Conjunctiva clear. Sclera anicteric .  CV: tachycardic, Peripheral perfusion intact.   Resp: unlabored work of breathing, on room air  Abd: soft, nt, nd  Msk: no gross deformity, + sarcopenia  Skin:  no jaundice  Ext: warm, well perfused.   Neuro: face symmetric. EOM, vision, hearing grossly intact. Speech fluent. Moves all extremities   Mental status/Psych: alert. Oriented. Asks/answers questions appropriately. Affect is flat and fatigued                Data Reviewed:     Reviewed recent pertinent imaging, comments:       Reviewed recent labs, comments:   ROUTINE ICU LABS (Last four results)  CMPRecent Labs   Lab 07/27/21  0629 07/26/21  1625 07/26/21  0633 07/25/21  2130 07/25/21  0616 07/24/21  0649     --  136  --  137 137   POTASSIUM 4.7  --  4.6  --  4.6 4.6   CHLORIDE 104  --  109  --  107 106   CO2 24  --  22  --  23 22   ANIONGAP 7  --  5  --  7 9   *  --  195*  --  185* 189*   BUN 43*  --  40*  --  42* 43*   CR 0.67  --  0.67  --  0.78 0.86   GFRESTIMATED 86  --  86  --  75 66   CEASAR 8.4*  --  8.8  --  8.4* 8.7   MAG 2.6*  --  2.6*  --  2.8* 2.8*   PHOS 5.0* 5.0* 5.1* 4.6* 5.0* 5.4*   PROTTOTAL 4.7*  --  4.8*  --  4.9* 4.8*   ALBUMIN 2.1*  --  2.1*  --  2.4* 2.4*   BILITOTAL 3.3*  --  2.8*  --  3.1* 3.0*   ALKPHOS 83  --  78  --  73 72   AST 61*  --  76*  --  86* 93*   ALT 36  --  38  --  39 37     CBC  Recent Labs   Lab 07/27/21  0629 07/26/21  0633 07/25/21  0616 07/24/21  0649   WBC 0.1* 0.2* 0.6* 0.4*   RBC 2.50* 2.63* 2.82* 2.28*   HGB 8.0* 8.5* 8.9* 7.1*   HCT 23.4* 25.6* 26.5* 22.3*   MCV 94 97 94 98   MCH 32.0 32.3 31.6 31.1   MCHC 34.2 33.2 33.6 31.8   RDW 18.1* 18.6* 19.0* 20.6*   PLT 12* 14* 23* 28*     INR  Recent Labs   Lab 07/27/21  0629 07/26/21  1625 07/26/21  0633 07/25/21  2130   INR 1.46* 1.31* 1.30* 1.41*     Arterial Blood GasNo lab results found in last 7 days.

## 2021-07-27 NOTE — PROGRESS NOTES
Care Management Progress Note    Length of Stay (days): 10    Expected Discharge Date: 07/29/2021     Concerns to be Addressed: discharge planning       Patient plan of care discussed at interdisciplinary rounds: Yes    Report received from palliative care Isabel, that patient and family wish to discharge to home with hospice services as soon as possible.     unsuccessfully attempted x2 to visit with patient and family to discuss discharge planning needs. Family declined initial visit and requested  return at a later time.  attempted to visit for a second time but patient's spouse was unavailable.      will attempt again 7/28/21.     Call received from patient's bedside nurse; patient and family requesting visit with .     met with patient and family in the patient's room; introduced self and explained role. Discussed request received to meet with patient to arrange hospice at time of discharge; patient and family confirmed. Family and patient tearful during visit and stated that all the information they had received today has been extremely overwhelming.  expressed understanding and offered to adjust visit to suit their needs.      explained the hospice philosophy, and focus on pain management, symptom control and end of life decision making.  explained insurance coverage for hospice services and equipment.  explained that hospice is supplemental services that come to wherever she is; and support her in living out the remainder of her life, however much she has left, the way that she wants to.      Social Work discussed hospice agency choice. Family stated that they have had great experience with [facility] and requested referral be submitted to [facility].      offered to meet with patient and family first thing 7/28/21 to confirm plans and  answer any additional questions; family agreed to plan.  This writer submitted initial referral to [facility] and requested review be completed for appropriateness.     PANKAJ Roberts  7D Hematology/Oncology Social Worker  Phone: [phone]  Pager: [phone]  [email or web address]

## 2021-07-27 NOTE — PROGRESS NOTES
CLINICAL NUTRITION SERVICES - BRIEF NOTE    Per chart review, noted order for calorie counts discontinued by Provider d/t pt now transitioning to comfort with plan for hospice at discharge.  Due to change in pt's status, no further nutrition intervention indicated at this time.  RD to sign off.      Carrie Duffy RD,LD  7D pager [phone]

## 2021-07-27 NOTE — PLAN OF CARE
"  Problem: Adult Inpatient Plan of Care  Goal: Absence of Hospital-Acquired Illness or Injury  Intervention: Identify and Manage Fall Risk  Safety Promotion/Fall Prevention:   assistive device/personal items within reach   activity supervised   fall prevention program maintained   clutter free environment maintained     Care resumed tonight.  Denies having pain. Repositioning done. /75 (BP Location: Right arm)   Pulse (!) 133   Temp 97.2  F (36.2  C) (Axillary)   Resp 18   Ht 1.626 m (5' 4\")   Wt 66.5 kg (146 lb 11.2 oz)   SpO2 95%. Brief response with interaction. Alert and oriented X3. Flat affect. Will continue to monitor.    "

## 2021-07-27 NOTE — CODE/RAPID RESPONSE
Rapid Response Team Note    Assessment   A rapid response was called on Heather Aden due to lactic acidosis. This presentation is likely due to malignancy and liver disease.    This is a 76 year old woman with HCV w/ cirrhosis and B-ALL who triggered our lactic acid protocol w/ tachycardia and leukopenia.    She has no new complaints, her lactic acid is stable and her vital signs are also unchanged.  I suspect her lactic acid elevation is secondary to her chronic medical issues.  I do not see an indication for new antibiotics, fluids or a lactic acid recheck.      Plan   -  No intervention per RRT team as noted above  -  The Hematology/Oncology primary team was paged and currently awaiting a response.  -  Disposition: The patient will remain on the current unit. We will continue to monitor this patient closely.  -  Reassessment and plan follow-up will be performed by the rapid response team    DASHAWN Gordillo CNP  [facility] RRT AMCOM Job Code Contact #[phone]    Hospital Course   Brief Summary of events leading to rapid response:   Called for LA elevation    Admission Diagnosis:   ALL (acute lymphoblastic leukemia) (H) [C91.00]     Physical Exam   Temp: (!) 96.5  F (35.8  C) Temp  Min: 95.3  F (35.2  C)  Max: 96.5  F (35.8  C)  Resp: 18 Resp  Min: 18  Max: 28  SpO2: 93 % SpO2  Min: 93 %  Max: 98 %  Pulse: 107 Pulse  Min: 106  Max: 135    No data recorded  BP: 133/66 Systolic (24hrs), Av , Min:130 , Max:156   Diastolic (24hrs), Av, Min:60, Max:78     I/Os: I/O last 3 completed shifts:  In: 140 [I.V.:140]  Out: 200 [Urine:200]     Exam:   General: chronically ill appearing  Mental Status: AAOx4.    Significant Results and Procedures   Lactic Acid:   Recent Labs   Lab Test 21  1657 21  1051   LACT 2.9* 3.0* 3.5*     CBC:   Recent Labs   Lab Test 21  0633 21  0616 21  0649   WBC 0.2* 0.6* 0.4*   HGB 8.5* 8.9* 7.1*   HCT 25.6* 26.5* 22.3*   PLT 14*  23* 28*        Sepsis Evaluation   The patient is not known to have an infection.  NO EVIDENCE OF SEPSIS at this time.  Vital sign, physical exam, and lab findings are due to malignancy.

## 2021-07-28 NOTE — PLAN OF CARE
Tachycardic, OVSS, afebrile on RA. Denied nausea, vomiting and pain.Hypoactive bowel sounds, on regular diet, with catheter in place.last bm was on the 25 of July, 2021. Up with assist of 2. Plan to discharge with home hospice in 1 or 2 day. Pt had been declining turning and repositioning. Will continue to monitor.

## 2021-07-28 NOTE — DISCHARGE SUMMARY
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    # DIC  # Hypofibrinogenemia   # MRSE Bacteremia   # HCV w/ compensated cirrhosis  # HBV exposed, not active infection  # Hyperbilirubinemia   # Mild transaminitis  # Esophageal Varices  # Hypothermia  # Disoriented   # Depression   # YIP   # Sinus Tachycardia   # Severe malnutrition in the context of acute on chronic illness     History of Present Illness   Heather Aden is a 76 year old female with history of chronic HCV c/b compensated liver cirrhosis and splenomegaly, Hep B exposure, B12 deficiency, and aortic sclerosis with insufficiency who initially presented to [facility] with syncope and dizziness. Was found to have leptomeningeal enhancement on brain MRI and pancytopenia. 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 She was transferred to [facility] for further workup and mgmt of B-ALL. Started modified PETHEMA (Day 1=7/21), dose reduced for liver function. LP flow negative on 7/19. Complicated by worsening liver function, DIC without evidence of bleed, severe malnutrition, medication non-compliance, deconditioning and MRSE bacteremia.       Ultimately, patients poor performance status and clinical decline precluded her from continuation of chemotherapy. Patient and family wish to pursue home hospice as she would not like to die in the hospital and does not want ongoing treatments. All questions answered. On the day of discharge, patient was hemodynamically stable, and felt safe and comfortable with the plans for discharge and follow-up with  home hospice. Was given an dose of IV morphine prior to discharge to help with pain control for her  ride home.    Hospice Medications:   - Morphine 20 mg/mL-2.5-5 mg (0.125-0.25 mL) PO/SL Q2H prn pain/SOB # 15 mL  - Lorazepam 2 mg/mL  Give 0.25-0.5 mg (0.125-0.25 mL) PO/SL Q4H prn anxiety/restlessness #15 mL  - Haloperidol 2 mg/mL  Give 0.5-1 mg (0.25-0.5 mL) PO/SL Q6H prn nausea/agitation #15 mL  - Lactulose BID   - Atropine sulfate 1% drops  Give 1-2 gtts Q4H prn oral secretions # 1 bottle  - Tylenol 650 mg supp.  Give supp NM Q4H prn pain/fever #2  - Bisacodyl 10 mg supp.  Give supp NM every day prn constipation #2   - Ritalin PRN fatigue, depression   - Doxycycline 100 mg BID x10 days for bacteremia       Hospital Course   Heather Aden was admitted on 7/17/2021.  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 with days of falls, and worsening functional status. Ultimately found to have progressive pancytopenia compared to prior, with worsening anemia and leukopenia. She had background leukopenia and thrombocytopenia, likely related to chronic liver disease. 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 FISH processed at [facility]- Ph negative, BCR/ABL1 rearrangement absent. Baseline performance status over the last few months has been 3. Dr. Washington will be her primary Heme Malignancy provider.   - Cytogenetics from [facility]: Complex Karyotype 6-72,XXX,<3N>,+1,-2,-3,-4,add (6)(q12),-8,add(11)(p11.2),-17,-18,+6-9mar,inc[cp2]/46,XX[7], near triploid karyotype with numerous abnormalities. Consistent with poor risk cytogenetics  - PICC placed 7/18  - Pre-phase with steroids; Dexamethasone 20mg q12h 7/18-7/19. Decreased to 20mg once daily 7/20 and 7/21. Discontinued, will follow steroid regimen per modified PETHEMA protocol as below   - Repeat peripheral flow 7/19 - 10% abnormal B-lymphoblasts. CD20 negative  - Given her underlying cirrhosis and age she is not a transplant candidate, and would  not likely be able to tolerate an intensive regimen (especially one with asparaginase). Initiated modified PETHEMA (Day 1 = 7/21/21) per the PETHEMA ALL-96 trial, 25% dose reduced for liver disease.   - Patient refusing medications and ADLs. Very withdrawn and clinically declining. Care conference held with patient and her family 7/27, given very poor performance status and inability to participate in cares would not recommend further chemo. Patient and family wish to pursue home hospice.      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 Vit K 10mg po x3 days (7/21-7/23)   - discontinue coags with transition to comfort cares      GI  #HCV w/ compensated cirrhosis  #HBV exposed, not active infection  #Hyperbilirubinemia   #Mild transaminitis   Patient with known HCV infection, and previous HBV exposure (surface Ab positive, core Ab positive, surface Ag negative). Imaging and labs with mild coagulopathy, splenomegaly and thrombocytopenia prior to ALL diagnosis all consistent with cirrhosis/advanced fibrosis. MELD score 14. Plan was for repeat imaging and AFP Q6 months. Recently started treatment with sofosbuvir/velpatasvir for HCV infection, although at [place] this was on hold, reportedly per Dr. Leventhal. There is no particular interaction between sofosbuvir/velpatasvir and chemo, although absorption may be reduced with PPI (which she should be on while on Dex).  - HBV DNA negative 7/19  - PTA sofosbuvir-velpatasvir (epclusa) 7/19- Epclusa discontinued with transition to comfort cares      # Esophageal Varices, non-bleeding. S/p banding x2 on 7/21/21  In preparation for chemotherapy and history as above, patient underwent EGD with Hepatology on 7/21. During the EGD she was noted to have large esophageal varices with no active bleed.  S/p banding x2 for prophylaxis. No complications.      ID   # Concern for community-acquired pneumonia  # Lactic acidosis  CT Chest at OSH 7/13/21 with small areas of ill-defined nodular opacities at the RUL and RML, probably post infectious/inflammatory in etiology. She has been afebrile on admission and denies cough, but has required 1L intermittently and endorses dyspnea on exertion. Previously not on O2.   - Levaquin 750mg once daily, for 7 days total (7/19-7/25)  - Lactic acid 2-3.5. Likely due to underlying leukemia and liver disease  - Limit IVF boluses due to known pleural effusion and risk for pulmonary edema     # Mild Hypothermia  Temp is consistently in the 96's F. No evidence of infections, BCx NGTD. MELD score 14. Etiology likely 2/2 underlying liver disease.    - Continue to monitor      # MRSE Bacteremia   - BCx positive from R peripheral 7/26 for MRSE. Discussed with patient and her , would like to start oral antibiotics in hopes to make her feel better and get home on hospice.   - Doxycycline 100 mg BID (7/28 -x), will plan for 10 day treatment course. Prescribed at hospice discharge.      NEURO/PSYCH  # Disorientation, restlessness -- resolved   # Forgetfulness   Patient has been increasingly restless and disoriented overnight 7/18, and 7/19. She is forgetful and sometimes nonsensical. No aphasia. Symptoms generally resolve by mid-morning. No other neurodeficits.   - Could be medication related, she has been receiving dex 20mg BID. Decreased to once daily qam on 7/20.   - Query if related to her liver disease. No asterixis. Per pt report she is having <1BM per day.   - Per Hepatology, an ammonia level would not be helpful in this setting as it is a clinical diagnosis, but would recommend starting lactulose  - Lactulose 20mg daily started x7/20. Goal is to have ~3BMs per day. Increased to 20mg BID x7/25 - hold or decrease dose if >3 BM/day     # Depression   # Withdrawal from cares, ADLs  #  Medication Non-compliance  Patient has been declining most of her medications and ADLs, requiring significant encouragement from nursing. These symptoms have gradually worsened since admission. She admits to starting to feel depressed, and really just wants to feel better and to be home with family for as long as possible. She says she wants to continue treatment, but is declining medications and simple tasks such as getting up to the commode.   - Continue lactulose as above  - Palliative care consulted, appreciate assistance - see above conversations      # Meningeal enhancement  Nonspecific homogeneous meningeal enhancement noted on MRI brain at OSH on 7/14/21.  - LP flow negative on 7/19. No further workup needed, likely benign      # Acute on Chronic Insomnia  She has history of chronic insomnia, acutely worse in the setting of steroids and acute illness. Pt reports that melatonin doesn't help.  - Ativan PRN     PULM  # Dyspnea on exertion  # Mild to moderate left pleural effusion  # Atelectasis  # Hypoxia --resolved  CT PE at OSH on 7/13 with mild to moderate left basilar pleural effusion and compressive atelectasis. Also with possible inflammation/infection of the RUL and RML. She has remained afebrile. Was started on 1L oxygen at OSH, continued on admission. She endorses dyspnea on exertion, no cough. She is a nonsmoker, no history of lung disease. Etiology of pleural effusion is unclear, could be related to infection vs related to her cirrhosis which would be less likely.   - Antibiotics as above.   - Caution with IVF     CV  # Sinus tachycardia  Baseline heart rate unclear per chart review. HR up to 140s. EKG with sinus tachycardia. No chest pain or dyspnea at rest. Could be multifactorial in the setting of leukemia, steroids, and borderline hypovolemia secondary to poor PO fluid intake. Echo 7/17/21 - EF 55-60%, otherwise normal.   - Decreased pre-phase steroids with slight improvement x7/20  - (7/21)  Patient continues to have sinus tachy 130s-140bpm. Unresponsive to fluids. No evidence of sepsis or bleed. BCx NGTD. Low suspicion for PE d/t negative CT Pulm Angio on 7/13 and pt is on room air. Ordered med/surg telemetry, ok to stay on 7D at this time  - limit IVF boluses due to known pleural effusion and risk for pulmonary edema     # LE edema  Patient receiving intermittent IVF, last given 500cc on 7/21AM. Now with 1+ LE edema x7/22. Her weight is stable and she is urinating well, not all intake and output is being reported. Pt is a poor historian, unclear how much fluids she is drinking but suspect it is limited. No dyspnea noted.   - Hold lasix at this time and limit IVF   - Lymphedema consulted      # HTN. Continue amlodipine 5mg daily  # Aortic sclerosis/insufficiency. Moderate AI seen on recent TTE. Continue to monitor     # Severe malnutrition in the context of acute on chronic illness  Patient not eating well for several weeks to months PTA.   - RDAT. Supplements ordered though patient doesn't like the inpatient ensure. Her  brought strawberry-flavored protein supplements from home.  - She is only eating about 1 meal per day despite being on steroids for ALL. Will try to hold off on TPN as able and continue to encourage small frequent meals.   - RD following, Rhett counts started x7/27 -- discontinued with transition to comfort cares      FEN:  - Encouraged PO intake. Bolus fluids PRN  - PRN lyte replacement     #Deconditioing  Pt severely deconditioned, PT/OT      CODE: DNR/DNI, POLST completed   Dispo: Discharge on home hospice in 1-2 days.      Patient was seen and plan of care was discussed with attending physician Dr. Sal Brito (Senthil), PA-C  Hematology/Oncology  Pager: #[phone]    Significant Results and Procedures   Results for orders placed or performed during the hospital encounter of 07/17/21   XR Chest Port 1 View    Narrative    XR CHEST PORT 1 VIEW  7/18/2021 10:14 AM       HISTORY: PICC placement    COMPARISON: None available    FINDINGS: AP chest radiograph. Trachea is midline, compressible is  partially obscured. Left upper survey PICC line tip projects over the  low right atrium. Bilateral perihilar interstitial streaky opacities.  Left pleural effusion with associated left basilar retrocardiac  opacity. No pneumothorax. No acute osseous or upper abdominal  abnormality.      Impression    IMPRESSION:  1. Left upper extremity PICC line tip projects over the right atrium.  2. Left pleural effusion with overlying basilar retrocardiac  atelectasis versus consolidation. Mild perihilar streaky opacities.    I have personally reviewed the examination and initial interpretation  and I agree with the findings.    JEANNE COWART MD         SYSTEM ID:  [id number]   XR Chest Port 1 View    Narrative    XR CHEST PORT 1 VIEW  7/18/2021 10:15 AM      HISTORY: PICC placement    COMPARISON: Same-day chest radiograph.    FINDINGS: AP chest radiograph. Retraction of left upper survey PICC  line, the tip now projects over the high right atrium. Otherwise  stable exam.      Impression    IMPRESSION: Left upper extremity PICC line tip now projects over the  high right atrium. Left pleural effusion with basilar  consolidation/atelectasis.    I have personally reviewed the examination and initial interpretation  and I agree with the findings.    JEANNE COWART MD         SYSTEM ID:  [id number]   US Abdomen Complete w Doppler Complete    Narrative    EXAMINATION: US ABDOMEN COMPLETE WITH DOPPLER COMPLETE 7/20/2021 9:26  AM     COMPARISON: Ultrasound abdomen 6/3/2021    HISTORY:   Newly diagnosed B-cell ALL with a history of hepatic fibrosis.    TECHNIQUE: The abdomen was scanned in standard fashion with  specialized ultrasound transducer(s) using both gray-scale, color  Doppler, and spectral flow techniques.    Findings:  Liver: The liver demonstrates diffuse increased echotexture with  nodular contour  consistent with history of hepatic fibrosis. There are  multiple simple appearing hepatic cysts which are not significantly  changed from the previous exam, the largest of which is in the right  liver and measures 3 x 2.1 x 3.1 cm.      Extrahepatic portal vein flow is antegrade at 16 cm/s.  Right portal vein flow is antegrade, measuring 9.1 cm/s. With reduced  pulsatility  Left portal vein flow is antegrade, measuring 14.9 cm/s.    Flow in the hepatic artery is towards the liver and:  93.2 cm/s peak systolic  0.86 resistive index.     The splenic vein is patent and flow is towards the liver.  The left,  middle, and right hepatic veins are patent with flow towards the IVC.  The IVC is patent with flow towards the heart.   The visualized aorta  is not dilated.    Gallbladder: Is surgically absent    Bile Ducts: Both the intra- and extrahepatic biliary system are of  normal caliber.  The common bile duct measures 7 mm.    Pancreas: Visualized portions of the head and body of the pancreas are  unremarkable.     Kidneys: Both kidneys are of normal echotexture, without mass or  hydronephrosis.   Renal lengths: right- 10.6, left- 10.5 cm.    Spleen: The spleen measures 14.9 cm in length.    Fluid: Small ascites.      Impression    Impression:   1.  The liver is of diffusely increased echotexture and nodular  contour consistent with cirrhosis.  2.  The portal venous system demonstrate low velocity and reduced  phasicity and the hepatic arterial system has elevated resistive  indices which indicates portal hypertension.  3.  Splenomegaly.  4.  Small ascites.    I have personally reviewed the examination and initial interpretation  and I agree with the findings.    JEANNE COWART MD         SYSTEM ID:  [id number]   Echocardiogram Complete     Value    LVEF  55-60%    3D LVEF 57%    Narrative    [id number]  [id number]  [id number]  [id number]^SANA^ANDREEA     [facility]  Echocardiography  Laboratory  [address] [place] [address]     Name: CARY OROURKE  MRN: [id number]  : 1945  Study Date: 2021 10:56 AM  Age: 76 yrs  Gender: Female  Patient Location: [facility] [place]  Reason For Study: Chemo  Ordering Physician: ANDREEA HOOD  Referring Physician: SISSY ROMERO  Performed By: Lisa Ricardo RDCS     BSA: 1.7 m2  Height: 64 in  Weight: 138 lb  HR: 114  BP: 136/52 mmHg  ______________________________________________________________________________  Procedure  Complete Portable Echo Adult. Echocardiogram with two-dimensional, color and  spectral Doppler performed.  ______________________________________________________________________________  Interpretation Summary  Global and regional left ventricular function is normal with an EF of 55-60%.  3D LVEF volumetric analysis is 57%.  Right ventricular function, chamber size, wall motion, and thickness are  normal.  No significant valvular abnormalities were noted.  No pericardial effusion is present.  Previous study not available for comparison.  ______________________________________________________________________________  Left Ventricle  Left ventricular size is normal. Global and regional left ventricular function  is normal with an EF of 55-60%. 3D LVEF volumetric analysis is 57%. Relative  wall thickness is increased consistent with concentric remodeling. Left  ventricular diastolic function is normal. Diastolic Doppler findings (E/E'  ratio and/or other parameters) suggest left ventricular filling pressures are  normal.     Right Ventricle  Right ventricular function, chamber size, wall motion, and thickness are  normal.     Atria  Both atria appear normal.     Mitral Valve  The mitral valve is normal. Trace mitral insufficiency is present.     Aortic Valve  Trileaflet aortic sclerosis without stenosis. Trace aortic insufficiency is  present.     Tricuspid Valve  The tricuspid valve is normal. Trace tricuspid  insufficiency is present. The  peak velocity of the tricuspid regurgitant jet is not obtainable. Pulmonary  artery systolic pressure cannot be assessed.     Pulmonic Valve  The pulmonic valve is normal.     Vessels  The aorta root is normal. The thoracic aorta is normal. The pulmonary artery  cannot be assessed. The inferior vena cava was normal in size with preserved  respiratory variability.     Pericardium  No pericardial effusion is present.     Compared to Previous Study  Previous study not available for comparison.  ______________________________________________________________________________  MMode/2D Measurements & Calculations  IVSd: 1.1 cm     LVIDd: 3.9 cm  LVIDs: 2.3 cm  LVPWd: 0.85 cm  FS: 39.6 %  LV mass(C)d: 114.1 grams  LV mass(C)dI: 68.3 grams/m2  Ao root diam: 3.3 cm  asc Aorta Diam: 3.2 cm  LVOT diam: 2.0 cm  LVOT area: 3.1 cm2  LA Volume (BP): 39.4 ml  LA Volume Index (BP): 23.6 ml/m2  RWT: 0.44     Doppler Measurements & Calculations  MV E max brady: 88.6 cm/sec  MV A max brady: 140.0 cm/sec  MV E/A: 0.63  MV dec slope: 731.0 cm/sec2  PA acc time: 0.07 sec  E/E' av.2  Lateral E/e': 8.3  Medial E/e': 14.0     QLAB 2DQ/CMQ  10_EDV(AP2)(aCMQ): 40.0 ml  10_ESV(AP2)(aCMQ): 10.3 ml  10_EDV(AP4)(aCMQ): 61.5 ml  10_ESV(AP4)(aCMQ): 12.3 ml  10_EDV(Bi-Plane)(aCMQ): 49.6 ml  10_EF(Bi-Plane)(aCMQ): 77.5 %  10_EF(AP2)(aCMQ): 74.4 %     10_EF(AP4)(aCMQ): 80.0 %  10_ESV(Bi-Plane)(aCMQ): 11.2 ml     QLAB 3DQ Advanced  Stroke Vol: 41.3 ml  10_EDV(3DQA): 72.0 ml  10_ESV(3DQA): 30.7 ml  10_EF(3DQA): 57.3 %  10_Tmsv 3-6: -12.0 msec  10_Tmsv 3-5: -15.0 msec  10_Tmsv 3-6 (%): -2.0 %  10_Tmsv 3-5 (%): -2.7 %     ______________________________________________________________________________  Report approved by: Melquiades Rosenbaum 2021 11:41 AM             Pending Results   These results will be followed up by N/A   Unresulted Labs Ordered in the Past 30 Days of this Admission     Date and Time Order Name  Status Description    7/26/2021  4:00 PM Blood Culture Red Port Preliminary     7/24/2021 11:50 AM Blood Culture Red Port Preliminary           Code Status   DNR / DNI    Primary Care Physician   Armando Noland    Physical Exam                     Vitals:    07/22/21 0942 07/25/21 0845   Weight: 64.2 kg (141 lb 9.6 oz) 66.5 kg (146 lb 11.2 oz)     Vital Signs with Ranges     I/O last 3 completed shifts:  In: -   Out: 1250 [Urine:1250]    General: lethargic thin female, lying in bed, no acute distress. Occasional word-finding difficulties but no aphasia   HEENT: sclera anicteric, EOMI, MMM, poor dentition with R lower teeth cracked/black in coloration. Non tender to palpation.   Neck: supple, normal ROM  CV: Tachycardic, regular rhythm with rare aberrant rhythm (PAC), normal S1/S2, no m/r/g  Resp: Diminished in left base, no wheezing/crackles, normal respiratory effort on ambient air  GI: slightly firm, non-tender, non-distended, bowel sounds present and normoactive  MSK: warm and well-perfused, no edema. Decreased tone. 1+ LE edema bilaterally   Skin: no rashes on limited exam, no jaundice. Scattered ecchymoses on arms  Neuro: AOx3, moves all extremities equally, no focal deficits. Essential tremor noted in her hands. No asterixis.   Psych: Flat affect, withdrawn   LUE PICC C/D/I without erythema     Time Spent on this Encounter   I, Latoya Brito PA-C, personally saw the patient today and spent greater than 30 minutes discharging this patient.    Discharge Disposition   Discharged to home  Condition at discharge: Terminal    Consultations This Hospital Stay   PHYSICAL THERAPY ADULT IP CONSULT  INTERNAL MEDICINE PROCEDURE TEAM ADULT IP CONSULT [facility] - LUMBAR PUNCTURE  VASCULAR ACCESS ADULT IP CONSULT  VASCULAR ACCESS FOR PICC PLACEMENT ADULT IP CONSULT  GI HEPATOLOGY ADULT IP CONSULT  CARE MANAGEMENT / SOCIAL WORK IP CONSULT  VASCULAR ACCESS CARE ADULT IP CONSULT  OCCUPATIONAL THERAPY ADULT IP  CONSULT  LYMPHEDEMA THERAPY IP CONSULT  PHARMACY TO DOSE [facility]  PALLIATIVE CARE ADULT IP CONSULT  PSYCHIATRY IP CONSULT  PSYCHOLOGY ADULT IP CONSULT    Discharge Orders      Adult Gastro Ref - Procedure Only      Reason for your hospital stay    T cell Acute Lymphoblastic Leukemia     Activity    Your activity upon discharge: bedrest     When to contact your care team    Contact home hospice team with any questions or concerns     Discharge Instructions    No need to follow up with oncology, hospice is now primary team managing end of life cares     Follow Up and recommended labs and tests    No need for follow up     Diet    Follow this diet upon discharge: Orders Placed This Encounter      Regular Diet Adult     Discharge Medications   Discharge Medication List as of 7/29/2021  8:31 AM      START taking these medications    Details   acetaminophen (TYLENOL) 650 MG suppository Place 1 suppository (650 mg) rectally every 4 hours as needed for fever or mild pain, Disp-2 suppository, R-1, E-Prescribe      atropine 1 % ophthalmic solution Place 1-2 drops into both eyes 4 times daily as needed for secretions, Disp-15 mL, R-1, E-Prescribe      bisacodyl (DULCOLAX) 10 MG suppository Place 1 suppository (10 mg) rectally daily as needed for constipation, Disp-2 suppository, R-1, E-Prescribe      doxycycline hyclate (VIBRAMYCIN) 100 MG capsule Take 1 capsule (100 mg) by mouth every 12 hours for 10 days, Disp-20 capsule, R-0, E-Prescribe      haloperidol (HALDOL) 2 MG/ML (HIGH CONC) solution Take 0.5 mLs (1 mg) by mouth every 6 hours as needed for agitation or other (nausea), Disp-15 mL, R-1, E-Prescribe      lactulose (CEPHULAC) 10 GM packet Take 2 packets (20 g) by mouth 2 times daily, Disp-30 packet, R-1, E-Prescribe      LORazepam (ATIVAN) 0.5 MG tablet Place 1 tablet (0.5 mg) under the tongue every 3 hours as needed for agitation, anxiety, nausea, sleep or vomiting, Disp-30 tablet, R-5, E-Prescribe      methylphenidate  (RITALIN) 5 MG tablet Take 1 tablet (5 mg) by mouth 2 times daily as needed (fatigue), Disp-30 tablet, R-0, E-Prescribe      morphine 10 MG/5ML solution Take 2.5 mLs (5 mg) by mouth every 2 hours as needed for pain (dyspnea), Disp-40 mL, R-0, E-Prescribe         STOP taking these medications       amLODIPine (NORVASC) 5 MG tablet Comments:   Reason for Stopping:         HYDROcodone-acetaminophen (NORCO) 5-325 MG tablet Comments:   Reason for Stopping:         predniSONE (DELTASONE) 20 MG tablet Comments:   Reason for Stopping:         sofosbuvir-velpatasvir (EPCLUSA) 400-100 MG per tablet Comments:   Reason for Stopping:             Allergies   No Known Allergies  Data   Most Recent 3 CBC's:  Recent Labs   Lab Test 07/27/21  0629 07/26/21  0633 07/25/21  0616   WBC 0.1* 0.2* 0.6*   HGB 8.0* 8.5* 8.9*   MCV 94 97 94   PLT 12* 14* 23*      Most Recent 3 BMP's:  Recent Labs   Lab Test 07/27/21  0629 07/26/21  0633 07/25/21  0616    136 137   POTASSIUM 4.7 4.6 4.6   CHLORIDE 104 109 107   CO2 24 22 23   BUN 43* 40* 42*   CR 0.67 0.67 0.78   ANIONGAP 7 5 7   CEASAR 8.4* 8.8 8.4*   * 195* 185*     Most Recent 2 LFT's:  Recent Labs   Lab Test 07/27/21 0629 07/26/21  0633   AST 61* 76*   ALT 36 38   ALKPHOS 83 78   BILITOTAL 3.3* 2.8*     Most Recent INR's and Anticoagulation Dosing History:  Anticoagulation Dose History     Recent Dosing and Labs Latest Ref Rng & Units 7/24/2021 7/24/2021 7/25/2021 7/25/2021 7/26/2021 7/26/2021 7/27/2021    INR 0.86 - 1.14 1.45(H) 1.37(H) 1.36(H) 1.41(H) 1.30(H) 1.31(H) 1.46(H)    INR 0.86 - 1.14 - - - - - - -        Most Recent 3 Troponin's:No lab results found.  Most Recent Cholesterol Panel:No lab results found.  Most Recent 6 Bacteria Isolates From Any Culture (See EPIC Reports for Culture Details):No lab results found.  Most Recent TSH, T4 and A1c Labs:No lab results found.

## 2021-07-28 NOTE — PLAN OF CARE
Physical Therapy Discharge Summary    Reason for therapy discharge:    Pt is now on comfort cares.    Progress towards therapy goal(s). See goals on Care Plan in Epic electronic health record for goal details.  Pt is on comfort cares, will discharge to home with hospice on 7/29.    Therapy recommendation(s):    Pt will discharge to home with hospice services on 7/29, hospice services will provide all necessary DME

## 2021-07-28 NOTE — PROGRESS NOTES
[facility]  Palliative Care Daily Progress Note       Recommendations & Counseling       Transitioned to comfort measures only, DNR/DNI and working toward home hospice discharge. Planning for tomorrow morning 7/29.      Non comfort medications stopped - pt and family wish to limit unnecessary medications as much as possible      Continue current comfort care prn medications as ordered; will add Ritalin 5 mg BID prn for fatigue/depression, avoid giving after 12PM (this should be ordered at discharge for pt to take home with her)      POLST form completed      If continues to have nausea regularly consider scheduling 5mg ODT olanzapine at bedtime (if patient agreeable to taking medication)    At discharge recommend including the following standard hospice PRN orders:  - Bisacodyl 10 mg Suppository MA daily to bid prn constipation  - Tylenol 650 mg suppository PO/MA q4hr prn pain, fever  - Lorazepam  0.5-1.0 mg PO/SL/MA q4h prn anxiety/restlessness  - Atropine sulfate 1% opth solution 1-2 drops SL q2h prn secretions  - Senna 8.6 mg 1-2 tabs PO prn constipation  - Haldol 1-2 mg PO/SL/MA q6h prn nausea, agitation or delirium.   - morphine 20mg/mL solution 5-10mg q 2 hours prn pain or shortness of breath  - Ritalin 5 mg BID prn fatigue, depression (avoid giving after 12PM)    Staffed and seen with Dr. Cam.    Yudith Thornton DO  [facility] Internal Medicine Resident, PGY-3    Patient seen and evaluated with Dr. Thornton.   Agree with assessment and recommendations.    Total time spent was 25 minutes,  >50% of time was spent counseling and/or coordination of care regarding patient family support, hospice discharge planning, symptom management for patient with ALL.    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      Today, the patient was seen for:  ALL  Depressed mood  fatigue    Prognosis, Goals, or Advance Care Planning was addressed today with: Yes.  Mood, coping, and/or meaning in the context of serious illness were addressed today: Yes.  Summary/Comments:  Patient and family transitioned to comfort care focus yesterday 7/27 after care conference with oncology, palliative care, SW and family ( and daughter) present. Family is working with social work to get patient home with hospice care, and plan seems to be for tomorrow 7/29.             Interval History:     Chart review/discussion with unit or clinical team members:   Continues to be fatigued    Per patient or family/caregivers today:  Fatigued  Patient continues to voice she does not want to take any medications, including the Ritalin as a scheduled medication that may improve her energy levels to spend time with family. She is agreeable to leaving this as a PRN to take home with her.  Has many worries that she prefer not to discuss with our team    Key Palliative Symptoms:  # Pain severity the last 12 hours: none  # Dyspnea severity the last 12 hours: none  # Nausea severity the last 12 hours: low  # Anxiety severity the last 12 hours: moderate    Patient is on opioids: assessed and bowels ok/no needed changes to plan of care today.           Review of Systems:     Besides above, an additional 4 point system ROS was reviewed and is unremarkable          Medications:     I have reviewed this patient's medication profile and medications during this hospitalization.             Physical Exam:   Vitals were reviewed  Temp: (!) 93  F (33.9  C) Temp src: Axillary BP: 125/50 Pulse: 107   Resp: 16 SpO2: 95 % O2 Device: None (Room air)    Gen: Lying in bed. Appears comfortable but fatigued  HEENT: NCAT. Conjunctiva clear. Sclera anicteric .  CV: Peripheral  perfusion intact, 2+ radial pulse  Resp: unlabored work of breathing, on room air  Abd: soft, nt, nd  Msk: no gross deformity, + sarcopenia  Skin:  no jaundice  Ext: warm, well perfused.   Neuro: face symmetric. EOM, vision, hearing grossly intact. Speech fluent. Moves all extremities   Mental status/Psych: alert. Oriented. Asks/answers questions appropriately. Affect is flat and fatigued               Data Reviewed:     Reviewed recent pertinent imaging, comments:       Reviewed recent labs, comments:   ROUTINE ICU LABS (Last four results)  CMP  Recent Labs   Lab 07/27/21  0629 07/26/21  1625 07/26/21  0633 07/25/21  2130 07/25/21  0616 07/24/21  0649     --  136  --  137 137   POTASSIUM 4.7  --  4.6  --  4.6 4.6   CHLORIDE 104  --  109  --  107 106   CO2 24  --  22  --  23 22   ANIONGAP 7  --  5  --  7 9   *  --  195*  --  185* 189*   BUN 43*  --  40*  --  42* 43*   CR 0.67  --  0.67  --  0.78 0.86   GFRESTIMATED 86  --  86  --  75 66   CEASAR 8.4*  --  8.8  --  8.4* 8.7   MAG 2.6*  --  2.6*  --  2.8* 2.8*   PHOS 5.0* 5.0* 5.1* 4.6* 5.0* 5.4*   PROTTOTAL 4.7*  --  4.8*  --  4.9* 4.8*   ALBUMIN 2.1*  --  2.1*  --  2.4* 2.4*   BILITOTAL 3.3*  --  2.8*  --  3.1* 3.0*   ALKPHOS 83  --  78  --  73 72   AST 61*  --  76*  --  86* 93*   ALT 36  --  38  --  39 37     CBC  Recent Labs   Lab 07/27/21  0629 07/26/21  0633 07/25/21  0616 07/24/21  0649   WBC 0.1* 0.2* 0.6* 0.4*   RBC 2.50* 2.63* 2.82* 2.28*   HGB 8.0* 8.5* 8.9* 7.1*   HCT 23.4* 25.6* 26.5* 22.3*   MCV 94 97 94 98   MCH 32.0 32.3 31.6 31.1   MCHC 34.2 33.2 33.6 31.8   RDW 18.1* 18.6* 19.0* 20.6*   PLT 12* 14* 23* 28*     INR  Recent Labs   Lab 07/27/21  0629 07/26/21  1625 07/26/21  0633 07/25/21  2130   INR 1.46* 1.31* 1.30* 1.41*     Arterial Blood GasNo lab results found in last 7 days.

## 2021-07-28 NOTE — PROGRESS NOTES
Hematology / Oncology  Daily Progress Note   Date of Service: 07/28/2021  Patient: Heather Aden  MRN: [id number]  Admission Date: 7/17/2021  Hospital Day # 11    Assessment & Plan:   Heather Aden is a 76 year old female with history of chronic HCV c/b compensated liver cirrhosis and splenomegaly, Hep B exposure, B12 deficiency, and aortic sclerosis with insufficiency who initially presented to [facility] with syncope and dizziness. Was found to have leptomeningeal enhancement on brain MRI and pancytopenia. 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 She was transferred to [facility] for further workup and mgmt of B-ALL. Started modified PETHEMA (Day 1=7/21), dose reduced for liver function. LP flow negative on 7/19. Complicated by worsening liver function, DIC without evidence of bleed, severe malnutrition, medication non-compliance, deconditioning and MRSE bacteremia.        TODAY:  - Plan to discharge on home hospice tomorrow at 9AM. Meds sent to discharge pharmacy.   - POLST formed completed by palliative care team, appreciate assistance.   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 Ultimately found to have progressive pancytopenia compared to prior, with worsening anemia and leukopenia. She had background leukopenia and thrombocytopenia, likely related to chronic liver disease. 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 FISH processed at [facility]- Ph negative, BCR/ABL1 rearrangement absent. Baseline performance status over the last few months has been 3. Dr. Washington will be her primary Heme Malignancy provider.   - Cytogenetics from [facility]: Complex Karyotype 6-72,XXX,<3N>,+1,-2,-3,-4,add (6)(q12),-8,add(11)(p11.2),-17,-18,+6-9mar,inc[cp2]/46,XX[7], near triploid karyotype with numerous abnormalities. Consistent  with poor risk cytogenetics  - PICC placed 7/18  - Pre-phase with steroids; Dexamethasone 20mg q12h 7/18-7/19. Decreased to 20mg once daily 7/20 and 7/21. Discontinued, will follow steroid regimen per modified PETHEMA protocol as below   - Repeat peripheral flow 7/19 - 10% abnormal B-lymphoblasts. CD20 negative  - Given her underlying cirrhosis and age she is not a transplant candidate, and would not likely be able to tolerate an intensive regimen (especially one with asparaginase). Initiated modified PETHEMA (Day 1 = 7/21/21) per the PETHEMA ALL-96 trial, 25% dose reduced for liver disease.   - Patient refusing medications and ADLs. Very withdrawn and clinically declining. Care conference held with patient and her family 7/27, given very poor performance status and inability to participate in cares would not recommend further chemo. Patient and family wish to pursue home hospice.     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 Vit K 10mg po x3 days (7/21-7/23)   - discontinue coags with transition to comfort cares     GI  #HCV w/ compensated cirrhosis  #HBV exposed, not active infection  #Hyperbilirubinemia   #Mild transaminitis   Patient with known HCV infection, and previous HBV exposure (surface Ab positive, core Ab positive, surface Ag negative). Imaging and labs with mild coagulopathy, splenomegaly and thrombocytopenia prior to ALL diagnosis all consistent with cirrhosis/advanced fibrosis. MELD score 14. Plan was for repeat imaging and AFP Q6 months. Recently started treatment with sofosbuvir/velpatasvir for HCV infection, although at [place] this was on hold, reportedly per Dr. Leventhal. There is no particular interaction between sofosbuvir/velpatasvir and chemo, although absorption may be reduced with PPI (which she should be  on while on Dex).  - HBV DNA negative 7/19  - PTA sofosbuvir-velpatasvir (epclusa) 7/19- Epclusa discontinued with transition to comfort cares     # Esophageal Varices, non-bleeding. S/p banding x2 on 7/21/21  In preparation for chemotherapy and history as above, patient underwent EGD with Hepatology on 7/21. During the EGD she was noted to have large esophageal varices with no active bleed. S/p banding x2 for prophylaxis. No complications.     ID   # Concern for community-acquired pneumonia  # Lactic acidosis  CT Chest at OSH 7/13/21 with small areas of ill-defined nodular opacities at the RUL and RML, probably post infectious/inflammatory in etiology. She has been afebrile on admission and denies cough, but has required 1L intermittently and endorses dyspnea on exertion. Previously not on O2.   - Levaquin 750mg once daily, for 7 days total (7/19-7/25)  - Lactic acid 2-3.5. Likely due to underlying leukemia and liver disease  - Limit IVF boluses due to known pleural effusion and risk for pulmonary edema     # Mild Hypothermia  Temp is consistently in the 96's F. No evidence of infections, BCx NGTD. MELD score 14. Etiology likely 2/2 underlying liver disease.    - Continue to monitor     # MRSE Bacteremia   - BCx positive from R peripheral 7/26 for MRSE. Discussed with patient and her , would like to start oral antibiotics in hopes to make her feel better and get home on hospice.   - Doxycycline 100 mg BID (7/28 -x), will plan for 10 day treatment course. Prescribed at hospice discharge.     NEURO/PSYCH  # Disorientation, restlessness -- resolved   # Forgetfulness   Patient has been increasingly restless and disoriented overnight 7/18, and 7/19. She is forgetful and sometimes nonsensical. No aphasia. Symptoms generally resolve by mid-morning. No other neurodeficits.   - Could be medication related, she has been receiving dex 20mg BID. Decreased to once daily qam on 7/20.   - Query if related to her liver  disease. No asterixis. Per pt report she is having <1BM per day.   - Per Hepatology, an ammonia level would not be helpful in this setting as it is a clinical diagnosis, but would recommend starting lactulose  - Lactulose 20mg daily started x7/20. Goal is to have ~3BMs per day. Increased to 20mg BID x7/25 - hold or decrease dose if >3 BM/day    # Depression   # Withdrawal from cares, ADLs  # Medication Non-compliance  Patient has been declining most of her medications and ADLs, requiring significant encouragement from nursing. These symptoms have gradually worsened since admission. She admits to starting to feel depressed, and really just wants to feel better and to be home with family for as long as possible. She says she wants to continue treatment, but is declining medications and simple tasks such as getting up to the commode.   - Continue lactulose as above  - Palliative care consulted, appreciate assistance - see above conversations     # Meningeal enhancement  Nonspecific homogeneous meningeal enhancement noted on MRI brain at OSH on 7/14/21.  - LP flow negative on 7/19. No further workup needed, likely benign     # Acute on Chronic Insomnia  She has history of chronic insomnia, acutely worse in the setting of steroids and acute illness. Pt reports that melatonin doesn't help.  - Ativan PRN     PULM  # Dyspnea on exertion  # Mild to moderate left pleural effusion  # Atelectasis  # Hypoxia --resolved  CT PE at OSH on 7/13 with mild to moderate left basilar pleural effusion and compressive atelectasis. Also with possible inflammation/infection of the RUL and RML. She has remained afebrile. Was started on 1L oxygen at OSH, continued on admission. She endorses dyspnea on exertion, no cough. She is a nonsmoker, no history of lung disease. Etiology of pleural effusion is unclear, could be related to infection vs related to her cirrhosis which would be less likely.   - Antibiotics as above.   - Caution with  IVF    CV  # Sinus tachycardia  Baseline heart rate unclear per chart review. HR up to 140s. EKG with sinus tachycardia. No chest pain or dyspnea at rest. Could be multifactorial in the setting of leukemia, steroids, and borderline hypovolemia secondary to poor PO fluid intake. Echo 7/17/21 - EF 55-60%, otherwise normal.   - Decreased pre-phase steroids with slight improvement x7/20  - (7/21) Patient continues to have sinus tachy 130s-140bpm. Unresponsive to fluids. No evidence of sepsis or bleed. BCx NGTD. Low suspicion for PE d/t negative CT Pulm Angio on 7/13 and pt is on room air. Ordered med/surg telemetry, ok to stay on 7D at this time  - limit IVF boluses due to known pleural effusion and risk for pulmonary edema    # LE edema  Patient receiving intermittent IVF, last given 500cc on 7/21AM. Now with 1+ LE edema x7/22. Her weight is stable and she is urinating well, not all intake and output is being reported. Pt is a poor historian, unclear how much fluids she is drinking but suspect it is limited. No dyspnea noted.   - Hold lasix at this time and limit IVF   - Lymphedema consulted     # HTN. Continue amlodipine 5mg daily  # Aortic sclerosis/insufficiency. Moderate AI seen on recent TTE. Continue to monitor    # Severe malnutrition in the context of acute on chronic illness  Patient not eating well for several weeks to months PTA.   - RDAT. Supplements ordered though patient doesn't like the inpatient ensure. Her  brought strawberry-flavored protein supplements from home.  - She is only eating about 1 meal per day despite being on steroids for ALL. Will try to hold off on TPN as able and continue to encourage small frequent meals.   - RD following, Rhett counts started x7/27 -- discontinued with transition to comfort cares     FEN:  - Encouraged PO intake. Bolus fluids PRN  - PRN lyte replacement    #Deconditioing  Pt severely deconditioned, PT/OT     CODE: DNR/DNI  Dispo: Discharge on home hospice in  "1-2 days.     Patient was seen and plan of care was discussed with attending physician Dr. Sal Ca ([facility], PAISMAEL    Hematology/Oncology   Pager: [phone]      Subjective & Interval History:    Overnight no acute events. Denies pain, nausea, vomiting, bleeding. Refusing medications this AM. Denies anxiety. Asking to go home ASAP on hospice.     Physical Exam:    Blood pressure 125/50, pulse 107, temperature (!) 93  F (33.9  C), temperature source Axillary, resp. rate 16, height 1.626 m (5' 4\"), weight 66.5 kg (146 lb 11.2 oz), SpO2 95 %.    General: lethargic thin female, lying in bed, no acute distress. Occasional word-finding difficulties but no aphasia   HEENT: sclera anicteric, EOMI, MMM, poor dentition with R lower teeth cracked/black in coloration. Non tender to palpation.   Neck: supple, normal ROM  CV: Tachycardic, regular rhythm with rare aberrant rhythm (PAC), normal S1/S2, no m/r/g  Resp: Diminished in left base, no wheezing/crackles, normal respiratory effort on ambient air  GI: slightly firm, non-tender, non-distended, bowel sounds present and normoactive  MSK: warm and well-perfused, no edema. Decreased tone. 1+ LE edema bilaterally   Skin: no rashes on limited exam, no jaundice. Scattered ecchymoses on arms  Neuro: AOx3, moves all extremities equally, no focal deficits. Essential tremor noted in her hands. No asterixis.   Psych: Flat affect, withdrawn   LUE PICC C/D/I without erythema    Labs & Studies: I personally reviewed the following studies:  ROUTINE LABS (Last four results):  CMP  Recent Labs   Lab 07/27/21  0629 07/26/21  1625 07/26/21  0633 07/25/21  2130 07/25/21  0616 07/24/21  0649     --  136  --  137 137   POTASSIUM 4.7  --  4.6  --  4.6 4.6   CHLORIDE 104  --  109  --  107 106   CO2 24  --  22  --  23 22   ANIONGAP 7  --  5  --  7 9   *  --  195*  --  185* 189*   BUN 43*  --  40*  --  42* 43*   CR 0.67  --  0.67  --  0.78 0.86   GFRESTIMATED 86  " --  86  --  75 66   CEASAR 8.4*  --  8.8  --  8.4* 8.7   MAG 2.6*  --  2.6*  --  2.8* 2.8*   PHOS 5.0* 5.0* 5.1* 4.6* 5.0* 5.4*   PROTTOTAL 4.7*  --  4.8*  --  4.9* 4.8*   ALBUMIN 2.1*  --  2.1*  --  2.4* 2.4*   BILITOTAL 3.3*  --  2.8*  --  3.1* 3.0*   ALKPHOS 83  --  78  --  73 72   AST 61*  --  76*  --  86* 93*   ALT 36  --  38  --  39 37     CBC  Recent Labs   Lab 07/27/21  0629 07/26/21  0633 07/25/21  0616 07/24/21  0649   WBC 0.1* 0.2* 0.6* 0.4*   RBC 2.50* 2.63* 2.82* 2.28*   HGB 8.0* 8.5* 8.9* 7.1*   HCT 23.4* 25.6* 26.5* 22.3*   MCV 94 97 94 98   MCH 32.0 32.3 31.6 31.1   MCHC 34.2 33.2 33.6 31.8   RDW 18.1* 18.6* 19.0* 20.6*   PLT 12* 14* 23* 28*     INR  Recent Labs   Lab 07/27/21  0629 07/26/21  1625 07/26/21  0633 07/25/21  2130   INR 1.46* 1.31* 1.30* 1.41*       Microbiology  Recent Labs   Lab 07/26/21 2014 07/25/21  1657 07/24/21  1051 07/23/21  1501   LACT 2.9* 3.0* 3.5* 2.5*       Pertinent Imaging    Medications list for reference:  Current Facility-Administered Medications   Medication     acetaminophen (TYLENOL) tablet 650 mg     albuterol (PROVENTIL) neb solution 2.5 mg     carboxymethylcellulose PF (REFRESH PLUS) 0.5 % ophthalmic solution 1-2 drop     doxycycline hyclate (VIBRAMYCIN) capsule 100 mg     glycopyrrolate (ROBINUL) tablet 2 mg     heparin lock flush 10 UNIT/ML injection 5-20 mL     heparin lock flush 10 UNIT/ML injection 5-20 mL     lidocaine (LMX4) cream     lidocaine 1 % 0.1-1 mL     LORazepam (ATIVAN) tablet 0.5-1 mg    Or     LORazepam (ATIVAN) injection 0.5-1 mg     melatonin tablet 10 mg     mineral oil-hydrophilic petrolatum (AQUAPHOR)     morphine (PF) injection 1-2 mg     naloxone (NARCAN) injection 0.1 mg     naloxone (NARCAN) injection 0.2 mg     naloxone (NARCAN) injection 0.2 mg     No rectal suppositories if WBC less than 1000/microliters or platelets less than 50,000/ L     ondansetron (ZOFRAN) injection 8 mg    Or     ondansetron (ZOFRAN-ODT) ODT tab 8 mg    Or      ondansetron (ZOFRAN) tablet 8 mg     prochlorperazine (COMPAZINE) tablet 5 mg    Or     prochlorperazine (COMPAZINE) injection 5 mg     senna-docusate (SENOKOT-S/PERICOLACE) 8.6-50 MG per tablet 1 tablet    Or     senna-docusate (SENOKOT-S/PERICOLACE) 8.6-50 MG per tablet 2 tablet     sodium chloride (OCEAN) 0.65 % nasal spray 1 spray     sodium chloride (PF) 0.9% PF flush 10-40 mL     traZODone (DESYREL) tablet 50 mg

## 2021-07-28 NOTE — PLAN OF CARE
Occupational Therapy and Lymphedema Discharge Summary    Reason for therapy discharge:    Goals of care changed to comfort/hospice    Progress towards therapy goal(s). See goals on Care Plan in Epic electronic health record for goal details.  Goals not met.  Barriers to achieving goals:   limited tolerance for therapy.   Patient to discharge home w/ hospice care on 7/29.    Therapy recommendation(s):    No further therapy is recommended.

## 2021-07-28 NOTE — PROGRESS NOTES
Care Management Discharge Note    Discharge Date: 07/29/2021     Discharge Disposition: Home, Hospice    [facility]  P: [phone]  F: [phone]    Discharge Services: None    Discharge DME: Bed, [employer]    Discharge Transportation: [employer] ([phone]) stretcher transport 7/29/21 at 0900. PCS form completed and faxed to [employer] billing.    Private pay costs discussed: transportation costs    Patient/family educated on Medicare website which has current facility and service quality ratings: yes    Education Provided on the Discharge Plan:  Yes    Persons Notified of Discharge Plans: Patient; patient's spouse; patient's daughter; Ann-Marie Ca PA-C; charge nurse; bedside nurse; [facility] liaison    Patient/Family in Agreement with the Plan: yes    Handoff Referral Completed: No - External    PANKAJ Roberts  7D Hematology/Oncology Social Worker  Phone: [phone]  Pager: [phone]  [email or web address]

## 2021-07-28 NOTE — PLAN OF CARE
[id number]    Temp (95.9) axillary, tachycardic. OVSS and on RA. Denies nausea/pain/SOB. Placed on comfort cares at 1600 today. PICC caps changed. Declined fan care. Declined repositioning x3. Adequate UOP via fan. Up with assist x2. Plans to transfer to home hospice. Continue to monitor & w/ POC.

## 2021-07-28 NOTE — PLAN OF CARE
Pt refusing oral antibiotics meds. Comfort care in place. Pt looks comfortable and decline to be reposition when offered. Pt is alert and awake and responsive to questions. Talking to daughter at time and able to express her needs.  Daughter at the bedside. Tomas catheter in place and draining well. Plan to discharge to home hospice tomorrow at 9am w/ stretcher.

## 2021-07-28 NOTE — CONSULTS
Hospice consult completed with pt spouse Kishore and dtr Bruna.  The hospice philosophy of care, available services and medicare benefit are explained.  Family would like pt to discharge home with [facility] services.  Plan is for pt to discharge in the morning with [facility] available to admit at the home between .  Bed and OBT will be delivered to the home today between 2-6.  In preparation for discharge, please fill and send the following comfort meds home with pt    Morphine 20 mg/mL-2.5-5 mg (0.125-0.25 mL) PO/SL Q2H prn pain/SOB # 15 mL    Lorazepam 2 mg/mL  Give 0.25-0.5 mg (0.125-0.25 mL) PO/SL Q4H prn anxiety/restlessness #15 mL    Haloperidol 2 mg/mL  Give 0.5-1 mg (0.25-0.5 mL) PO/SL Q6H prn nausea/agitation #15 mL    Senna 8.6 mg tab. Give 1-2 tab PO BID prn constipation # stock    Atropine sulfate 1% drops  Give 1-2 gtts Q4H prn oral secretions # 1 bottle    Tylenol 650 mg supp.  Give supp WI Q4H prn pain/fever #2    Bisacodyl 10 mg supp.  Give supp WI every day prn constipation #2    Please consider filling out POLST and sending with pt.    Care coordinated today with pt spouse and dtr and Brittny MAZARIEGOS.  Family request writer not visit directly with pt at this time.    Thank you for the referral.    Rani Smiley RN [facility]  Referral Specialist  [phone]

## 2021-07-28 NOTE — PROGRESS NOTES
SPIRITUAL HEALTH SERVICES  SPIRITUAL ASSESSMENT Progress Note (Palliative Focus)  [facility] ([place]) 7D    REFERRAL SOURCE: Palliative care initial spiritual assessment.    Patient Heather Aden and family decline spiritual health visits at this time, per Palliative Care SW.     Plan: I am available as needed, with no plans to follow unless requested. Palliative Care SW following for emotional support.    María Elena Becerra  Palliative   Pager [phone]  [facility] Inpatient Team Consult pager [phone] (M-F 8-4:30)  After-hours Answering Service [phone]

## 2021-07-29 NOTE — PROGRESS NOTES
Focus: Discharge Note    Situation: Pt goal of care is comfort. Pt wanting to be at home. Home Hospice service is following pt. Pt moaning this morning. Morphine 2mg IV given prior to EMS picking up pt. Pt is somnolent. Not alert to take oral pills. Open her eyes briefly but not able to keep her eyes open for conversation.  Daughter at the bedside and teary.     Background: New diagnose ALL. Received chemo treatment and pursuing home hospice instead.     Assessment: Pt is comfortable after morphine IV given.     Plan: Discharge to home hospice @ 10:01 with EMS stretcher

## 2021-07-29 NOTE — PLAN OF CARE
"[id number]    Pt on comfort cares, vitals per request. A&Ox3. Tried to get out of bed several times to \"go home.\" Reoriented pt to room and reminded that she would be spending one more night in the hospital and then be able to go home at 0900 tomorrow morning. Pt told writer to \"go away\" and \"leave me alone\" several times throughout shift. Pt denied pain/nausea and refused repositioning x2. Offered scheduled meds twice, refused both times. Gave PRN IV ativan x1, little relief per daughter in room. Gave IV morphine x1 for comfort. Pt scheduled to discharge tomorrow at 0900. PICC to be removed prior to discharge. Continue to monitor & w POC.   "

## 2021-07-29 NOTE — PLAN OF CARE
Tachycardic, OVSS, afebrile on RA. Pt was given lorazepam x1 for anxiety and morphine x2 for moaning. Pt is not able to state her pain. Catheter draining adequate output 300ml for this shift. Reposition done twice overnight.last bm was on the 25 of July, 2021. Up with assist of 2. Plan to discharge with home hospice today at 09am.  daughter at bedsides. Will continue to monitor.

## 2021-07-31 LAB — BACTERIA BLD CULT: NO GROWTH

## 2022-11-29 NOTE — PLAN OF CARE
[id number]  Neuro: A&Ox4; withdrawn.   C/V: ST (HR: 103-120s). Afebrile.  Resp: RA. O2 Sat in the high 90s.  GI: Hyperactive bowel sounds. No BM this shift. BMx3 during previous shift.  : Adequate UO. 800mL this shift.  Access: L Basilic PICC    Significant Events: Cryo transfused. Pt tolerated well. No adverse reactions noted.     Pt has been refusing to get on commode to urinate. After offering multiple times, pt agrees. UO was 800mL at that time.    Pt currently resting in bed. VSS. No signs of distress noted. Will continue to monitor.     Yes

## (undated) RX ORDER — FENTANYL CITRATE 50 UG/ML
INJECTION, SOLUTION INTRAMUSCULAR; INTRAVENOUS
Status: DISPENSED
Start: 2021-01-01

## (undated) RX ORDER — SIMETHICONE 40MG/0.6ML
SUSPENSION, DROPS(FINAL DOSAGE FORM)(ML) ORAL
Status: DISPENSED
Start: 2021-01-01